# Patient Record
Sex: MALE | Race: WHITE | NOT HISPANIC OR LATINO | Employment: OTHER | URBAN - METROPOLITAN AREA
[De-identification: names, ages, dates, MRNs, and addresses within clinical notes are randomized per-mention and may not be internally consistent; named-entity substitution may affect disease eponyms.]

---

## 2023-02-17 PROBLEM — E78.5 DYSLIPIDEMIA: Status: ACTIVE | Noted: 2023-02-17

## 2023-02-17 PROBLEM — I10 HYPERTENSION: Status: ACTIVE | Noted: 2023-02-17

## 2023-02-17 PROBLEM — I25.10 CAD S/P PERCUTANEOUS CORONARY ANGIOPLASTY: Status: ACTIVE | Noted: 2023-02-17

## 2023-02-17 PROBLEM — R53.1 WEAKNESS: Status: ACTIVE | Noted: 2023-02-17

## 2023-02-17 PROBLEM — R20.2 PARESTHESIA OF RIGHT LEG: Status: ACTIVE | Noted: 2023-02-17

## 2023-02-17 PROBLEM — C67.9 MALIGNANT NEOPLASM OF URINARY BLADDER (MULTI): Status: ACTIVE | Noted: 2023-02-17

## 2023-02-17 PROBLEM — C61 PROSTATE CANCER (MULTI): Status: ACTIVE | Noted: 2023-02-17

## 2023-02-17 PROBLEM — Z98.61 CAD S/P PERCUTANEOUS CORONARY ANGIOPLASTY: Status: ACTIVE | Noted: 2023-02-17

## 2023-02-17 PROBLEM — M21.371 RIGHT FOOT DROP: Status: ACTIVE | Noted: 2023-02-17

## 2023-02-17 PROBLEM — I65.21 STENOSIS OF RIGHT CAROTID ARTERY: Status: ACTIVE | Noted: 2023-02-17

## 2023-02-17 PROBLEM — R20.0 NUMBNESS OF LEG: Status: ACTIVE | Noted: 2023-02-17

## 2023-02-17 PROBLEM — M25.569 KNEE PAIN: Status: ACTIVE | Noted: 2023-02-17

## 2023-02-17 PROBLEM — M48.061 LUMBAR SPINAL STENOSIS: Status: ACTIVE | Noted: 2023-02-17

## 2023-02-17 PROBLEM — S83.90XA KNEE SPRAIN: Status: ACTIVE | Noted: 2023-02-17

## 2023-02-17 RX ORDER — METOPROLOL SUCCINATE 25 MG/1
1 TABLET, EXTENDED RELEASE ORAL DAILY
COMMUNITY
Start: 2017-10-31 | End: 2023-11-15 | Stop reason: SDUPTHER

## 2023-02-17 RX ORDER — ATORVASTATIN CALCIUM 80 MG/1
1 TABLET, FILM COATED ORAL DAILY
COMMUNITY
Start: 2014-01-09 | End: 2023-11-15 | Stop reason: SDUPTHER

## 2023-02-17 RX ORDER — ASPIRIN 81 MG/1
1 TABLET ORAL DAILY
COMMUNITY
Start: 2022-03-29

## 2023-03-31 ENCOUNTER — OFFICE VISIT (OUTPATIENT)
Dept: PRIMARY CARE | Facility: CLINIC | Age: 73
End: 2023-03-31
Payer: MEDICARE

## 2023-03-31 VITALS
HEART RATE: 78 BPM | SYSTOLIC BLOOD PRESSURE: 122 MMHG | WEIGHT: 166.4 LBS | TEMPERATURE: 97.3 F | HEIGHT: 70 IN | BODY MASS INDEX: 23.82 KG/M2 | DIASTOLIC BLOOD PRESSURE: 80 MMHG

## 2023-03-31 DIAGNOSIS — I25.10 CAD S/P PERCUTANEOUS CORONARY ANGIOPLASTY: ICD-10-CM

## 2023-03-31 DIAGNOSIS — Z00.00 ROUTINE GENERAL MEDICAL EXAMINATION AT HEALTH CARE FACILITY: Primary | ICD-10-CM

## 2023-03-31 DIAGNOSIS — Z98.61 CAD S/P PERCUTANEOUS CORONARY ANGIOPLASTY: ICD-10-CM

## 2023-03-31 DIAGNOSIS — C61 PROSTATE CANCER (MULTI): ICD-10-CM

## 2023-03-31 PROBLEM — M48.061 LUMBAR SPINAL STENOSIS: Status: RESOLVED | Noted: 2023-02-17 | Resolved: 2023-03-31

## 2023-03-31 PROBLEM — R20.2 PARESTHESIA OF RIGHT LEG: Status: RESOLVED | Noted: 2023-02-17 | Resolved: 2023-03-31

## 2023-03-31 PROBLEM — I65.21 STENOSIS OF RIGHT CAROTID ARTERY: Status: RESOLVED | Noted: 2023-02-17 | Resolved: 2023-03-31

## 2023-03-31 PROBLEM — C67.9 MALIGNANT NEOPLASM OF URINARY BLADDER (MULTI): Status: RESOLVED | Noted: 2023-02-17 | Resolved: 2023-03-31

## 2023-03-31 PROBLEM — M21.371 RIGHT FOOT DROP: Status: RESOLVED | Noted: 2023-02-17 | Resolved: 2023-03-31

## 2023-03-31 PROBLEM — R20.0 NUMBNESS OF LEG: Status: RESOLVED | Noted: 2023-02-17 | Resolved: 2023-03-31

## 2023-03-31 PROCEDURE — 1159F MED LIST DOCD IN RCRD: CPT | Performed by: INTERNAL MEDICINE

## 2023-03-31 PROCEDURE — 1036F TOBACCO NON-USER: CPT | Performed by: INTERNAL MEDICINE

## 2023-03-31 PROCEDURE — 3079F DIAST BP 80-89 MM HG: CPT | Performed by: INTERNAL MEDICINE

## 2023-03-31 PROCEDURE — 3074F SYST BP LT 130 MM HG: CPT | Performed by: INTERNAL MEDICINE

## 2023-03-31 PROCEDURE — 1160F RVW MEDS BY RX/DR IN RCRD: CPT | Performed by: INTERNAL MEDICINE

## 2023-03-31 PROCEDURE — G0439 PPPS, SUBSEQ VISIT: HCPCS | Performed by: INTERNAL MEDICINE

## 2023-03-31 PROCEDURE — 1170F FXNL STATUS ASSESSED: CPT | Performed by: INTERNAL MEDICINE

## 2023-03-31 ASSESSMENT — ENCOUNTER SYMPTOMS
MUSCULOSKELETAL NEGATIVE: 1
CARDIOVASCULAR NEGATIVE: 1
DEPRESSION: 0
EYES NEGATIVE: 1
CONSTITUTIONAL NEGATIVE: 1
NUMBNESS: 1
GASTROINTESTINAL NEGATIVE: 1
OCCASIONAL FEELINGS OF UNSTEADINESS: 0
LOSS OF SENSATION IN FEET: 0
RESPIRATORY NEGATIVE: 1

## 2023-03-31 ASSESSMENT — ACTIVITIES OF DAILY LIVING (ADL)
BATHING: INDEPENDENT
DRESSING: INDEPENDENT

## 2023-03-31 NOTE — PROGRESS NOTES
"Subjective   Reason for Visit: Mario Pringle is an 72 y.o. male here for a Medicare Wellness visit.     Past Medical, Surgical, and Family History reviewed and updated in chart.    Reviewed all medications by prescribing practitioner or clinical pharmacist (such as prescriptions, OTCs, herbal therapies and supplements) and documented in the medical record.    Patient was seen for wellness exam today, he has a back surgery in the interim, he has carotid Dopplers done, he is doing well, he has a history of coronary artery disease with PCI, he has history of stroke, he has history of prostate cancer, he has been followed at [facility] for prostate cancer, we reviewed vaccinations, we went through all the events and concerns, we went through living will, wellness exam was completed.        Patient Care Team:  Omkar Rodriguez MD as PCP - General  Arlene Eduardo MD as PCP - [facility] Attributed Provider     Review of Systems   Constitutional: Negative.    HENT: Negative.     Eyes: Negative.    Respiratory: Negative.     Cardiovascular: Negative.    Gastrointestinal: Negative.    Genitourinary:         Incontinance   Musculoskeletal: Negative.         Mild foot drop rt side   Skin: Negative.    Neurological:  Positive for numbness.       Objective   Vitals:  /80 (BP Location: Right arm, Patient Position: Sitting, BP Cuff Size: Adult)   Pulse 78   Temp 36.3 °C (97.3 °F) (Temporal)   Ht 1.765 m (5' 9.5\")   Wt 75.5 kg (166 lb 6.4 oz)   BMI 24.22 kg/m²       Physical Exam  Constitutional:       Appearance: Normal appearance. He is normal weight.   HENT:      Head: Normocephalic.      Nose: Nose normal.   Eyes:      Conjunctiva/sclera: Conjunctivae normal.   Cardiovascular:      Rate and Rhythm: Normal rate and regular rhythm.      Pulses: Normal pulses.      Heart sounds: Normal heart sounds.   Pulmonary:      Effort: Pulmonary effort is normal.      Breath sounds: Normal breath sounds.   Abdominal:      General: " Abdomen is flat.      Palpations: Abdomen is soft.   Musculoskeletal:         General: Normal range of motion.      Cervical back: Normal range of motion and neck supple.   Skin:     General: Skin is warm and dry.   Neurological:      General: No focal deficit present.      Mental Status: He is oriented to person, place, and time. Mental status is at baseline.   Psychiatric:         Mood and Affect: Mood normal.       Assessment/Plan   Problem List Items Addressed This Visit          Other    Routine general medical examination at [facility] Medicare wellness exam was done, he comes annually, interim labs and investigations were reviewed, no major concern, 23.4% risk of atherosclerotic cardiovascular disease risk noticed patient has been seen by cardiology, incontinence persist, he can get a Shingrix vaccine, I do not see any obvious foot drop in the right lower extremity, he will come annually for wellness check.

## 2023-04-05 ENCOUNTER — LAB (OUTPATIENT)
Dept: LAB | Facility: LAB | Age: 73
End: 2023-04-05
Payer: MEDICARE

## 2023-04-05 DIAGNOSIS — I25.10 CAD S/P PERCUTANEOUS CORONARY ANGIOPLASTY: ICD-10-CM

## 2023-04-05 DIAGNOSIS — Z00.00 ROUTINE GENERAL MEDICAL EXAMINATION AT HEALTH CARE FACILITY: ICD-10-CM

## 2023-04-05 DIAGNOSIS — C61 PROSTATE CANCER (MULTI): ICD-10-CM

## 2023-04-05 DIAGNOSIS — Z98.61 CAD S/P PERCUTANEOUS CORONARY ANGIOPLASTY: ICD-10-CM

## 2023-04-05 LAB
ALANINE AMINOTRANSFERASE (SGPT) (U/L) IN SER/PLAS: 30 U/L (ref 10–52)
ALBUMIN (G/DL) IN SER/PLAS: 4.4 G/DL (ref 3.4–5)
ALKALINE PHOSPHATASE (U/L) IN SER/PLAS: 57 U/L (ref 33–136)
ANION GAP IN SER/PLAS: 12 MMOL/L (ref 10–20)
ASPARTATE AMINOTRANSFERASE (SGOT) (U/L) IN SER/PLAS: 36 U/L (ref 9–39)
BASOPHILS (10*3/UL) IN BLOOD BY AUTOMATED COUNT: 0.05 X10E9/L (ref 0–0.1)
BASOPHILS/100 LEUKOCYTES IN BLOOD BY AUTOMATED COUNT: 0.8 % (ref 0–2)
BILIRUBIN TOTAL (MG/DL) IN SER/PLAS: 0.9 MG/DL (ref 0–1.2)
CALCIUM (MG/DL) IN SER/PLAS: 9.4 MG/DL (ref 8.6–10.3)
CARBON DIOXIDE, TOTAL (MMOL/L) IN SER/PLAS: 28 MMOL/L (ref 21–32)
CHLORIDE (MMOL/L) IN SER/PLAS: 106 MMOL/L (ref 98–107)
CHOLESTEROL (MG/DL) IN SER/PLAS: 142 MG/DL (ref 0–199)
CHOLESTEROL IN HDL (MG/DL) IN SER/PLAS: 45.5 MG/DL
CHOLESTEROL/HDL RATIO: 3.1
CREATININE (MG/DL) IN SER/PLAS: 1.04 MG/DL (ref 0.5–1.3)
EOSINOPHILS (10*3/UL) IN BLOOD BY AUTOMATED COUNT: 0.25 X10E9/L (ref 0–0.4)
EOSINOPHILS/100 LEUKOCYTES IN BLOOD BY AUTOMATED COUNT: 3.9 % (ref 0–6)
ERYTHROCYTE DISTRIBUTION WIDTH (RATIO) BY AUTOMATED COUNT: 12.7 % (ref 11.5–14.5)
ERYTHROCYTE MEAN CORPUSCULAR HEMOGLOBIN CONCENTRATION (G/DL) BY AUTOMATED: 34 G/DL (ref 32–36)
ERYTHROCYTE MEAN CORPUSCULAR VOLUME (FL) BY AUTOMATED COUNT: 99 FL (ref 80–100)
ERYTHROCYTES (10*6/UL) IN BLOOD BY AUTOMATED COUNT: 4.36 X10E12/L (ref 4.5–5.9)
GFR MALE: 75 ML/MIN/1.73M2
GLUCOSE (MG/DL) IN SER/PLAS: 89 MG/DL (ref 74–99)
HEMATOCRIT (%) IN BLOOD BY AUTOMATED COUNT: 43 % (ref 41–52)
HEMOGLOBIN (G/DL) IN BLOOD: 14.6 G/DL (ref 13.5–17.5)
HEPATITIS C VIRUS AB PRESENCE IN SERUM: NONREACTIVE
IMMATURE GRANULOCYTES/100 LEUKOCYTES IN BLOOD BY AUTOMATED COUNT: 0.2 % (ref 0–0.9)
LDL: 75 MG/DL (ref 0–99)
LEUKOCYTES (10*3/UL) IN BLOOD BY AUTOMATED COUNT: 6.4 X10E9/L (ref 4.4–11.3)
LYMPHOCYTES (10*3/UL) IN BLOOD BY AUTOMATED COUNT: 2.91 X10E9/L (ref 0.8–3)
LYMPHOCYTES/100 LEUKOCYTES IN BLOOD BY AUTOMATED COUNT: 45.5 % (ref 13–44)
MONOCYTES (10*3/UL) IN BLOOD BY AUTOMATED COUNT: 0.45 X10E9/L (ref 0.05–0.8)
MONOCYTES/100 LEUKOCYTES IN BLOOD BY AUTOMATED COUNT: 7 % (ref 2–10)
NEUTROPHILS (10*3/UL) IN BLOOD BY AUTOMATED COUNT: 2.72 X10E9/L (ref 1.6–5.5)
NEUTROPHILS/100 LEUKOCYTES IN BLOOD BY AUTOMATED COUNT: 42.6 % (ref 40–80)
PLATELETS (10*3/UL) IN BLOOD AUTOMATED COUNT: 166 X10E9/L (ref 150–450)
POTASSIUM (MMOL/L) IN SER/PLAS: 4.4 MMOL/L (ref 3.5–5.3)
PROSTATE SPECIFIC ANTIGEN,SCREEN: <0.01 NG/ML (ref 0–4)
PROTEIN TOTAL: 6.9 G/DL (ref 6.4–8.2)
SODIUM (MMOL/L) IN SER/PLAS: 142 MMOL/L (ref 136–145)
TRIGLYCERIDE (MG/DL) IN SER/PLAS: 109 MG/DL (ref 0–149)
UREA NITROGEN (MG/DL) IN SER/PLAS: 19 MG/DL (ref 6–23)
VLDL: 22 MG/DL (ref 0–40)

## 2023-04-05 PROCEDURE — 80053 COMPREHEN METABOLIC PANEL: CPT

## 2023-04-05 PROCEDURE — 36415 COLL VENOUS BLD VENIPUNCTURE: CPT

## 2023-04-05 PROCEDURE — 86803 HEPATITIS C AB TEST: CPT

## 2023-04-05 PROCEDURE — 85025 COMPLETE CBC W/AUTO DIFF WBC: CPT

## 2023-04-05 PROCEDURE — 80061 LIPID PANEL: CPT

## 2023-04-05 PROCEDURE — 84153 ASSAY OF PSA TOTAL: CPT

## 2023-07-25 ENCOUNTER — OFFICE VISIT (OUTPATIENT)
Dept: PRIMARY CARE | Facility: CLINIC | Age: 73
End: 2023-07-25
Payer: MEDICARE

## 2023-07-25 VITALS
WEIGHT: 165.2 LBS | TEMPERATURE: 97.2 F | HEART RATE: 104 BPM | RESPIRATION RATE: 16 BRPM | OXYGEN SATURATION: 98 % | DIASTOLIC BLOOD PRESSURE: 80 MMHG | SYSTOLIC BLOOD PRESSURE: 120 MMHG | BODY MASS INDEX: 23.65 KG/M2 | HEIGHT: 70 IN

## 2023-07-25 DIAGNOSIS — L29.9 ITCHY SKIN: ICD-10-CM

## 2023-07-25 DIAGNOSIS — L29.8 PRURITIC ERYTHEMATOUS RASH: ICD-10-CM

## 2023-07-25 DIAGNOSIS — Z91.030 BEE STING ALLERGY: Primary | ICD-10-CM

## 2023-07-25 PROCEDURE — 99213 OFFICE O/P EST LOW 20 MIN: CPT | Performed by: NURSE PRACTITIONER

## 2023-07-25 RX ORDER — PREDNISONE 10 MG/1
TABLET ORAL
Qty: 30 TABLET | Refills: 0 | Status: SHIPPED | OUTPATIENT
Start: 2023-07-25 | End: 2023-08-04

## 2023-07-25 RX ORDER — CETIRIZINE HYDROCHLORIDE 10 MG/1
10 TABLET ORAL DAILY
Qty: 30 TABLET | Refills: 2 | Status: SHIPPED | OUTPATIENT
Start: 2023-07-25 | End: 2024-05-31 | Stop reason: WASHOUT

## 2023-07-25 RX ORDER — HYDROCORTISONE 25 MG/G
OINTMENT TOPICAL 2 TIMES DAILY PRN
Qty: 30 G | Refills: 2 | Status: SHIPPED | OUTPATIENT
Start: 2023-07-25 | End: 2024-05-31 | Stop reason: WASHOUT

## 2023-07-25 ASSESSMENT — PATIENT HEALTH QUESTIONNAIRE - PHQ9
1. LITTLE INTEREST OR PLEASURE IN DOING THINGS: NOT AT ALL
2. FEELING DOWN, DEPRESSED OR HOPELESS: NOT AT ALL
2. FEELING DOWN, DEPRESSED OR HOPELESS: NOT AT ALL
SUM OF ALL RESPONSES TO PHQ9 QUESTIONS 1 AND 2: 0
SUM OF ALL RESPONSES TO PHQ9 QUESTIONS 1 AND 2: 0
1. LITTLE INTEREST OR PLEASURE IN DOING THINGS: NOT AT ALL

## 2023-07-25 ASSESSMENT — ENCOUNTER SYMPTOMS
DEPRESSION: 0
LOSS OF SENSATION IN FEET: 0
OCCASIONAL FEELINGS OF UNSTEADINESS: 0

## 2023-07-25 NOTE — PROGRESS NOTES
Subjective   Patient ID: Mario Pringle is a 73 y.o. male who is with complaint of multiple red itchy skin rash due to bee sting allergy,    HPI  Patient is a 73 y.o. male who CONSULTED AT [facility] today. Patient is with complaints of multiple red itchy rash on both arms, both forearms, both thighs, and both legs. Patient states that present condition started about 4 days ago with itchy red skin rash on the mentioned areas after being stung multiple times by bees. he states that the rash are red, itchy, not painful, with no discharge, and no bleeding. he tried OTC but it afforded only slight / temporary relief of symptoms. he denies any wheezing, shortness of breath, change in voice, nor chest pain at present. he denies fever, chills, cough, nor runny nose. he denies any other signs or symptoms.    Review of Systems  General: no weight loss, generally healthy, no fatigue  Head:  no headaches / sinus pain, no vertigo, no injury  Eyes: no diplopia, no tearing, no pain,   Ears: no change in hearing, no tinnitus, no bleeding, no vertigo  Mouth:  no dental difficulties, no gingival bleeding, no sore throat, no loss of sense of taste  Nose: no congestion, no  discharge, no bleeding, no obstruction, no loss of sense of smell  Neck: no stiffness, no pain, no tenderness, no masses, no bruit  Pulmonary: no dyspnea, no wheezing, no hemoptysis, no cough  Cardiovascular: no chest pain, no palpitations, no syncope, no orthopnea  Gastrointestinal: no change in appetite, no dysphagia, no abdominal pains, no diarrhea, no emesis, no melena  Genito Urinary: no dysuria, no urinary urgency, no nocturia, no incontinence, no change in nature of urine  Musculoskeletal: no muscle ache, no joint pain, no limitation of range of motion, no paresthesia, no numbness  Skin: (+) multiple red itchy rash on both arms, both forearms, both thighs, and both legs  Constitutional: no fever, no chills, no night  sweats    Objective   Physical Exam  General: ambulatory, in no acute distress  Head: normocephalic, no lesions  Eyes: pink palpebral conjunctiva, anicteric sclerae, PERRLA, EOM's full  Nose: nasal mucosa normal, no nasal discharge, no bleeding, no obstruction  Throat: clear, no exudate, no lesions  Neck: supple, no masses, no bruits  Chest: symmetrical chest expansion, no lagging, no retractions, clear breath sounds, no rales, no wheezes  Extremities: full and equal peripheral pulses, no edema,  SKIN: (+) multiple maculopapular rash on both arms, both forearms, both thighs, and both legs: rashes are erythematous, slightly elevated, measures 2-4 cm diameter, pruritic, non tender, no discharge, no bleeding, with no lymphatic streaking of skin, with multiple scratch marks on involved areas.    Assessment/Plan   Problem List Items Addressed This Visit    None  Visit Diagnoses       Bee sting allergy    -  Primary    Relevant Medications    predniSONE (Deltasone) 10 mg tablet    cetirizine (ZyrTEC) 10 mg tablet    hydrocortisone 2.5 % ointment    Itchy skin        Relevant Medications    predniSONE (Deltasone) 10 mg tablet    cetirizine (ZyrTEC) 10 mg tablet    hydrocortisone 2.5 % ointment    Pruritic erythematous rash        Relevant Medications    predniSONE (Deltasone) 10 mg tablet    cetirizine (ZyrTEC) 10 mg tablet    hydrocortisone 2.5 % ointment        DISCHARGE SUMMARY:   Patient was seen and examined. Diagnosis, treatment, treatment options, and possible complications of today's illness discussed and explained to patient. Patient to take medication/s associated with this visit. Advised avoidance of known or suspected allergen. Advised hypoallergenic diet. Advised to come back if with worsening or persistent symptoms. Advised to come back if there is wheezing, change in voice quality, shortness of breath or chest pain. Patient verbalized understanding of plan of care.    Patient to come back in 7 - 10 days if  needed for worsening symptoms.

## 2023-07-25 NOTE — PROGRESS NOTES
"Subjective   Patient ID: Mario Pringle is a 73 y.o. male who presents for Insect Bite.    Patient is in office with a biee sting on left arm , both legs. Patient took benadryl with little help. Patient states sits itching at the moment.         Review of Systems    Objective   /80   Pulse 104   Temp 36.2 °C (97.2 °F) (Temporal)   Resp 16   Ht 1.778 m (5' 10\")   Wt 74.9 kg (165 lb 3.2 oz)   SpO2 98%   BMI 23.70 kg/m²     Physical Exam    Assessment/Plan          "

## 2023-10-05 ENCOUNTER — ANCILLARY PROCEDURE (OUTPATIENT)
Dept: CARDIOLOGY | Facility: CLINIC | Age: 73
End: 2023-10-05
Payer: MEDICARE

## 2023-10-05 ENCOUNTER — CLINICAL SUPPORT (OUTPATIENT)
Dept: CARDIOLOGY | Facility: CLINIC | Age: 73
End: 2023-10-05
Payer: MEDICARE

## 2023-10-05 VITALS — BODY MASS INDEX: 26.54 KG/M2 | HEIGHT: 66 IN | WEIGHT: 165.12 LBS

## 2023-10-05 DIAGNOSIS — I25.10 CORONARY ARTERY DISEASE, UNSPECIFIED VESSEL OR LESION TYPE, UNSPECIFIED WHETHER ANGINA PRESENT, UNSPECIFIED WHETHER NATIVE OR TRANSPLANTED HEART: ICD-10-CM

## 2023-10-05 DIAGNOSIS — I10 HYPERTENSION, UNSPECIFIED TYPE: ICD-10-CM

## 2023-10-05 DIAGNOSIS — I35.8 HEART MURMUR, AORTIC: ICD-10-CM

## 2023-10-05 DIAGNOSIS — R09.89 BRUIT: ICD-10-CM

## 2023-10-05 DIAGNOSIS — R01.1 CARDIAC MURMUR, UNSPECIFIED: ICD-10-CM

## 2023-10-05 DIAGNOSIS — I65.21 CAROTID STENOSIS, ASYMPTOMATIC, RIGHT: ICD-10-CM

## 2023-10-05 PROCEDURE — 93306 TTE W/DOPPLER COMPLETE: CPT | Performed by: INTERNAL MEDICINE

## 2023-10-05 PROCEDURE — 93880 EXTRACRANIAL BILAT STUDY: CPT | Performed by: INTERNAL MEDICINE

## 2023-10-05 PROCEDURE — 93880 EXTRACRANIAL BILAT STUDY: CPT

## 2023-10-05 PROCEDURE — 93306 TTE W/DOPPLER COMPLETE: CPT

## 2023-10-06 LAB — EJECTION FRACTION APICAL 4 CHAMBER: 35.2

## 2023-10-25 ENCOUNTER — TELEPHONE (OUTPATIENT)
Dept: CARDIOLOGY | Facility: CLINIC | Age: 73
End: 2023-10-25
Payer: MEDICARE

## 2023-10-25 DIAGNOSIS — I25.5 CARDIOMYOPATHY, ISCHEMIC: ICD-10-CM

## 2023-10-25 DIAGNOSIS — I65.23 BILATERAL CAROTID ARTERY STENOSIS: Primary | ICD-10-CM

## 2023-10-25 DIAGNOSIS — I25.10 CAD S/P PERCUTANEOUS CORONARY ANGIOPLASTY: ICD-10-CM

## 2023-10-25 DIAGNOSIS — Z98.61 CAD S/P PERCUTANEOUS CORONARY ANGIOPLASTY: ICD-10-CM

## 2023-10-25 DIAGNOSIS — R93.1 ABNORMAL ECHOCARDIOGRAM: ICD-10-CM

## 2023-10-25 NOTE — TELEPHONE ENCOUNTER
Pt contact's office leaves voicemail message requesting results of recent echo and stress results. [place]

## 2023-10-31 PROBLEM — I25.5 CARDIOMYOPATHY, ISCHEMIC: Status: ACTIVE | Noted: 2023-10-31

## 2023-10-31 PROBLEM — I77.9 BILATERAL CAROTID ARTERY DISEASE (CMS-HCC): Status: ACTIVE | Noted: 2023-10-31

## 2023-11-15 DIAGNOSIS — I15.9 SECONDARY HYPERTENSION: ICD-10-CM

## 2023-11-15 RX ORDER — METOPROLOL SUCCINATE 25 MG/1
25 TABLET, EXTENDED RELEASE ORAL DAILY
Qty: 90 TABLET | Refills: 1 | Status: SHIPPED | OUTPATIENT
Start: 2023-11-15 | End: 2024-04-28

## 2023-11-15 RX ORDER — ATORVASTATIN CALCIUM 80 MG/1
80 TABLET, FILM COATED ORAL DAILY
Qty: 90 TABLET | Refills: 2 | Status: SHIPPED | OUTPATIENT
Start: 2023-11-15

## 2023-12-04 ENCOUNTER — CLINICAL SUPPORT (OUTPATIENT)
Dept: CARDIOLOGY | Facility: CLINIC | Age: 73
End: 2023-12-04
Payer: MEDICARE

## 2023-12-04 ENCOUNTER — ANCILLARY PROCEDURE (OUTPATIENT)
Dept: RADIOLOGY | Facility: CLINIC | Age: 73
End: 2023-12-04
Payer: MEDICARE

## 2023-12-04 DIAGNOSIS — I25.10 CAD S/P PERCUTANEOUS CORONARY ANGIOPLASTY: ICD-10-CM

## 2023-12-04 DIAGNOSIS — I25.5 CARDIOMYOPATHY, ISCHEMIC: ICD-10-CM

## 2023-12-04 DIAGNOSIS — Z98.61 CAD S/P PERCUTANEOUS CORONARY ANGIOPLASTY: ICD-10-CM

## 2023-12-04 DIAGNOSIS — R93.1 ABNORMAL ECHOCARDIOGRAM: ICD-10-CM

## 2023-12-04 PROCEDURE — 78452 HT MUSCLE IMAGE SPECT MULT: CPT | Performed by: INTERNAL MEDICINE

## 2023-12-04 PROCEDURE — 93017 CV STRESS TEST TRACING ONLY: CPT

## 2023-12-04 PROCEDURE — 78452 HT MUSCLE IMAGE SPECT MULT: CPT

## 2023-12-04 PROCEDURE — 93016 CV STRESS TEST SUPVJ ONLY: CPT | Performed by: INTERNAL MEDICINE

## 2023-12-04 PROCEDURE — 3430000001 HC RX 343 DIAGNOSTIC RADIOPHARMACEUTICALS: Performed by: INTERNAL MEDICINE

## 2023-12-04 PROCEDURE — A9502 TC99M TETROFOSMIN: HCPCS | Performed by: INTERNAL MEDICINE

## 2023-12-04 PROCEDURE — 93018 CV STRESS TEST I&R ONLY: CPT | Performed by: INTERNAL MEDICINE

## 2023-12-04 RX ADMIN — TETROFOSMIN 36 MILLICURIE: 0.23 INJECTION, POWDER, LYOPHILIZED, FOR SOLUTION INTRAVENOUS at 09:35

## 2023-12-04 RX ADMIN — TETROFOSMIN 11.3 MILLICURIE: 0.23 INJECTION, POWDER, LYOPHILIZED, FOR SOLUTION INTRAVENOUS at 07:57

## 2024-04-04 ENCOUNTER — APPOINTMENT (OUTPATIENT)
Dept: PRIMARY CARE | Facility: CLINIC | Age: 74
End: 2024-04-04
Payer: MEDICARE

## 2024-04-05 ENCOUNTER — APPOINTMENT (OUTPATIENT)
Dept: PRIMARY CARE | Facility: CLINIC | Age: 74
End: 2024-04-05
Payer: MEDICARE

## 2024-04-28 DIAGNOSIS — I15.9 SECONDARY HYPERTENSION: ICD-10-CM

## 2024-04-28 RX ORDER — METOPROLOL SUCCINATE 25 MG/1
25 TABLET, EXTENDED RELEASE ORAL DAILY
Qty: 90 TABLET | Refills: 1 | Status: SHIPPED | OUTPATIENT
Start: 2024-04-28 | End: 2024-06-10 | Stop reason: SDUPTHER

## 2024-05-07 ENCOUNTER — OFFICE VISIT (OUTPATIENT)
Dept: PRIMARY CARE | Facility: CLINIC | Age: 74
End: 2024-05-07
Payer: MEDICARE

## 2024-05-07 VITALS
TEMPERATURE: 96.6 F | WEIGHT: 161 LBS | HEIGHT: 66 IN | BODY MASS INDEX: 25.88 KG/M2 | HEART RATE: 67 BPM | SYSTOLIC BLOOD PRESSURE: 120 MMHG | DIASTOLIC BLOOD PRESSURE: 80 MMHG

## 2024-05-07 DIAGNOSIS — I65.23 BILATERAL CAROTID ARTERY STENOSIS: ICD-10-CM

## 2024-05-07 DIAGNOSIS — Z98.61 CAD S/P PERCUTANEOUS CORONARY ANGIOPLASTY: ICD-10-CM

## 2024-05-07 DIAGNOSIS — Z12.12 SCREENING FOR COLORECTAL CANCER: ICD-10-CM

## 2024-05-07 DIAGNOSIS — Z13.29 THYROID DISORDER SCREEN: ICD-10-CM

## 2024-05-07 DIAGNOSIS — C61 PROSTATE CANCER (MULTI): ICD-10-CM

## 2024-05-07 DIAGNOSIS — I25.10 CAD S/P PERCUTANEOUS CORONARY ANGIOPLASTY: ICD-10-CM

## 2024-05-07 DIAGNOSIS — Z12.11 SCREENING FOR COLORECTAL CANCER: ICD-10-CM

## 2024-05-07 DIAGNOSIS — Z00.00 ROUTINE GENERAL MEDICAL EXAMINATION AT HEALTH CARE FACILITY: Primary | ICD-10-CM

## 2024-05-07 PROBLEM — I77.9 BILATERAL CAROTID ARTERY DISEASE (CMS-HCC): Status: RESOLVED | Noted: 2023-10-31 | Resolved: 2024-05-07

## 2024-05-07 PROCEDURE — 1170F FXNL STATUS ASSESSED: CPT | Performed by: INTERNAL MEDICINE

## 2024-05-07 PROCEDURE — G0439 PPPS, SUBSEQ VISIT: HCPCS | Performed by: INTERNAL MEDICINE

## 2024-05-07 PROCEDURE — 1159F MED LIST DOCD IN RCRD: CPT | Performed by: INTERNAL MEDICINE

## 2024-05-07 PROCEDURE — 1123F ACP DISCUSS/DSCN MKR DOCD: CPT | Performed by: INTERNAL MEDICINE

## 2024-05-07 PROCEDURE — 1160F RVW MEDS BY RX/DR IN RCRD: CPT | Performed by: INTERNAL MEDICINE

## 2024-05-07 PROCEDURE — 3079F DIAST BP 80-89 MM HG: CPT | Performed by: INTERNAL MEDICINE

## 2024-05-07 PROCEDURE — 1036F TOBACCO NON-USER: CPT | Performed by: INTERNAL MEDICINE

## 2024-05-07 PROCEDURE — 3074F SYST BP LT 130 MM HG: CPT | Performed by: INTERNAL MEDICINE

## 2024-05-07 PROCEDURE — 1157F ADVNC CARE PLAN IN RCRD: CPT | Performed by: INTERNAL MEDICINE

## 2024-05-07 RX ORDER — EZETIMIBE 10 MG/1
10 TABLET ORAL DAILY
COMMUNITY

## 2024-05-07 ASSESSMENT — ACTIVITIES OF DAILY LIVING (ADL)
EATING: INDEPENDENT
TOILETING: INDEPENDENT
WALKS IN HOME: INDEPENDENT
BATHING: INDEPENDENT
BATHING: INDEPENDENT
ADEQUATE_TO_COMPLETE_ADL: YES
TOILETING: INDEPENDENT
JUDGMENT_ADEQUATE_SAFELY_COMPLETE_DAILY_ACTIVITIES: YES
PILL BOX USED: NO
TAKING MEDICATION: INDEPENDENT
DOING HOUSEWORK: INDEPENDENT
JUDGMENT_ADEQUATE_SAFELY_COMPLETE_DAILY_ACTIVITIES: YES
MANAGING_FINANCES: INDEPENDENT
GROCERY SHOPPING: INDEPENDENT
STIL DRIVING: YES
ADEQUATE_TO_COMPLETE_ADL: YES
NEEDS ASSISTANCE WITH FOOD: INDEPENDENT
BATHING: INDEPENDENT
MANAGING FINANCES: INDEPENDENT
DRESSING: INDEPENDENT
TAKING_MEDICATION: INDEPENDENT
PATIENT'S MEMORY ADEQUATE TO SAFELY COMPLETE DAILY ACTIVITIES?: YES
DOING_HOUSEWORK: INDEPENDENT
FEEDING YOURSELF: INDEPENDENT
DRESSING YOURSELF: INDEPENDENT
HEARING - RIGHT EAR: FUNCTIONAL
USING TRANSPORTATION: INDEPENDENT
GROCERY_SHOPPING: INDEPENDENT
FEEDING: INDEPENDENT
GROOMING: INDEPENDENT
HEARING - LEFT EAR: FUNCTIONAL
PREPARING MEALS: INDEPENDENT
DRESSING: INDEPENDENT
USING TELEPHONE: INDEPENDENT

## 2024-05-07 ASSESSMENT — COLUMBIA-SUICIDE SEVERITY RATING SCALE - C-SSRS
1. IN THE PAST MONTH, HAVE YOU WISHED YOU WERE DEAD OR WISHED YOU COULD GO TO SLEEP AND NOT WAKE UP?: NO
2. HAVE YOU ACTUALLY HAD ANY THOUGHTS OF KILLING YOURSELF?: NO

## 2024-05-07 ASSESSMENT — PATIENT HEALTH QUESTIONNAIRE - PHQ9
2. FEELING DOWN, DEPRESSED OR HOPELESS: NOT AT ALL
1. LITTLE INTEREST OR PLEASURE IN DOING THINGS: NOT AT ALL
SUM OF ALL RESPONSES TO PHQ9 QUESTIONS 1 AND 2: 0
2. FEELING DOWN, DEPRESSED OR HOPELESS: NOT AT ALL
1. LITTLE INTEREST OR PLEASURE IN DOING THINGS: NOT AT ALL
SUM OF ALL RESPONSES TO PHQ9 QUESTIONS 1 AND 2: 0

## 2024-05-07 ASSESSMENT — ENCOUNTER SYMPTOMS
GASTROINTESTINAL NEGATIVE: 1
CONSTITUTIONAL NEGATIVE: 1
RESPIRATORY NEGATIVE: 1
MUSCULOSKELETAL NEGATIVE: 1
EYES NEGATIVE: 1
LOSS OF SENSATION IN FEET: 0
WEAKNESS: 0
DEPRESSION: 0
DIZZINESS: 0
NUMBNESS: 1
OCCASIONAL FEELINGS OF UNSTEADINESS: 0
CARDIOVASCULAR NEGATIVE: 1

## 2024-05-07 NOTE — PROGRESS NOTES
"Subjective   Reason for Visit: Mario Pringle is an 73 y.o. male here for a Medicare Wellness visit.     Past Medical, Surgical, and Family History reviewed and updated in chart.    Reviewed all medications by prescribing practitioner or clinical pharmacist (such as prescriptions, OTCs, herbal therapies and supplements) and documented in the medical record.    73-year-old male patient came year for annual wellness exam.  He works part-time, he is very much active with his lifestyle he does regular physical activities.  In 2006 he has a myocardial infarction which led to PCI's, at that time his ejection fraction was 45%, recently he was seen by cardiologist has echo done ejection fraction was 30% has a stress Myoview done it was abnormal because of large infarct and there was multiple areas of hypokinesis because of old infarct but there was no ischemia but ejection fraction was still reported to be low, he does not have any symptoms from low ejection fraction, carotids were 50 to 60% stenosed, he is on full dose of statin aspirin, he is a survivor of prostate cancer, he is very much active physically, no fall, he has up to date living will, he comes once a year for wellness exam, he takes his medications as prescribed.  His vaccinations are up to date.        Patient Care Team:  Omkar Rodriguez MD as PCP - General  Omkar Rodriguez MD as PCP - [facility] Attributed [facility]  Juan Sharma MD as Consulting Physician (Cardiology)     Review of Systems   Constitutional: Negative.    HENT: Negative.     Eyes: Negative.    Respiratory: Negative.     Cardiovascular: Negative.  Negative for chest pain and leg swelling.   Gastrointestinal: Negative.    Musculoskeletal: Negative.    Neurological:  Positive for numbness. Negative for dizziness and weakness.       Objective   Vitals:  Temp 35.9 °C (96.6 °F) (Temporal)   Ht 1.676 m (5' 6\")   Wt 73 kg (161 lb)   BMI 25.99 kg/m²       Physical Exam  Constitutional:       " Appearance: Normal appearance. He is normal weight.   HENT:      Head: Normocephalic.      Nose: Nose normal.   Eyes:      Conjunctiva/sclera: Conjunctivae normal.   Cardiovascular:      Rate and Rhythm: Normal rate. Rhythm regularly irregular.      Pulses: Normal pulses.      Heart sounds: Normal heart sounds.   Pulmonary:      Effort: Pulmonary effort is normal.      Breath sounds: Normal breath sounds.   Abdominal:      General: Abdomen is flat.      Palpations: Abdomen is soft.   Musculoskeletal:         General: Normal range of motion.      Cervical back: Normal range of motion and neck supple.   Skin:     General: Skin is warm and dry.   Neurological:      General: No focal deficit present.      Mental Status: He is oriented to person, place, and time. Mental status is at baseline.   Psychiatric:         Mood and Affect: Mood normal.         Assessment/Plan   Problem List Items Addressed This Visit       Routine general medical examination at health care facility - [facility]     Other Visit Diagnoses       Screening for colorectal cancer        Relevant Orders    Referral to Gastroenterology        Wellness exam was completed, message was sent to the cardiology about the abnormal echo and low ejection fraction, he has isolated PVCs so we wonder that patient could be a candidate for ICD.  He is feeling fine he is asymptomatic at there is no fluid overload, there is no obvious symptoms of ischemic cardiomyopathy that she manifest, his PSA has been checked annually, several laboratories were ordered as part of the routine.  No chest pains, no angina, he has a 1 stroke in the past, he remains on beta-blockers, statins, Zetia, LDL has been kept as low as possible.  He is very particular about his physical health, he is particular about his diet, cardiology acknowledged the message and patient will be seen this week, question is whether he needs ICD or not as I told him.  No change in therapies and wellness exam  related questions were asked and addressed, he will come once a year for his wellness exam.

## 2024-05-10 ENCOUNTER — OFFICE VISIT (OUTPATIENT)
Dept: CARDIOLOGY | Facility: CLINIC | Age: 74
End: 2024-05-10
Payer: MEDICARE

## 2024-05-10 VITALS
BODY MASS INDEX: 26.13 KG/M2 | SYSTOLIC BLOOD PRESSURE: 118 MMHG | HEIGHT: 66 IN | HEART RATE: 98 BPM | DIASTOLIC BLOOD PRESSURE: 60 MMHG | WEIGHT: 162.6 LBS

## 2024-05-10 DIAGNOSIS — I50.22 CHRONIC SYSTOLIC HEART FAILURE (MULTI): ICD-10-CM

## 2024-05-10 DIAGNOSIS — I25.2 OLD INFERIOR WALL MYOCARDIAL INFARCTION: ICD-10-CM

## 2024-05-10 DIAGNOSIS — I25.10 ATHEROSCLEROSIS OF NATIVE CORONARY ARTERY OF NATIVE HEART WITHOUT ANGINA PECTORIS: Primary | ICD-10-CM

## 2024-05-10 DIAGNOSIS — I10 PRIMARY HYPERTENSION: ICD-10-CM

## 2024-05-10 DIAGNOSIS — E78.5 DYSLIPIDEMIA: ICD-10-CM

## 2024-05-10 DIAGNOSIS — I25.5 CARDIOMYOPATHY, ISCHEMIC: ICD-10-CM

## 2024-05-10 DIAGNOSIS — Z98.61 H/O PERCUTANEOUS TRANSLUMINAL CORONARY ANGIOPLASTY: ICD-10-CM

## 2024-05-10 PROCEDURE — 3078F DIAST BP <80 MM HG: CPT | Performed by: INTERNAL MEDICINE

## 2024-05-10 PROCEDURE — 1159F MED LIST DOCD IN RCRD: CPT | Performed by: INTERNAL MEDICINE

## 2024-05-10 PROCEDURE — 1160F RVW MEDS BY RX/DR IN RCRD: CPT | Performed by: INTERNAL MEDICINE

## 2024-05-10 PROCEDURE — 3074F SYST BP LT 130 MM HG: CPT | Performed by: INTERNAL MEDICINE

## 2024-05-10 PROCEDURE — 1123F ACP DISCUSS/DSCN MKR DOCD: CPT | Performed by: INTERNAL MEDICINE

## 2024-05-10 PROCEDURE — 99215 OFFICE O/P EST HI 40 MIN: CPT | Performed by: INTERNAL MEDICINE

## 2024-05-10 PROCEDURE — 1157F ADVNC CARE PLAN IN RCRD: CPT | Performed by: INTERNAL MEDICINE

## 2024-05-10 NOTE — H&P (VIEW-ONLY)
Patient:  Mario Pringle  YOB: 1950  MRN: [id number]       HPI:       Mario Pringle is a 73 y.o. male who returns today for cardiac follow-up.   He has a history of atherosclerotic heart disease with remote STEMI in November 2006. He underwent angioplasty and stenting of the RCA at that time. An echocardiogram in 2006 showed inferolateral wall hypokinesis and estimated LV ejection fraction of 45%. Aortic valve appeared to be bicuspid. A stress myocardial perfusion study showed mild inferior wall myocardial ischemia versus shifting diaphragmatic attenuation artifact. Calculated LV ejection fraction 44%. He has a history of hypertension, hyperlipidemia, and carotid artery disease. He has been followed on a regular basis by Dr. Berry Childress. He is maintained on aspirin, atorvastatin, and Toprol-XL.     At the time of his initial visit with me on September 12, 2023 he was doing well.  He takes a brisk 5 to 6 mile walk daily.  He was scheduled for a repeat carotid ultrasound as well as an echocardiogram to reassess LV ejection fraction and reported bicuspid aortic valve.  The echocardiogram showed an estimated LV ejection fraction of 30% with severe hypokinesis of the inferior and inferolateral walls.  The distal anterior wall was hypokinetic.  Right ventricular chamber size and function are normal.  Valvular structure and function were normal.  Carotid ultrasound October 5, 2023 showed 50 to 69% stenosis in the right proximal internal carotid artery and greater than 50% stenosis of the right external carotid artery.  There was 50 to 69% stenosis of the left internal carotid artery.  A myocardial perfusion study on December 4, 2023 showed moderate inferolateral myocardial infarction extends from apex to base.  No evidence of myocardial ischemia by perfusion imaging.  There was severe LV dysfunction with calculated LV ejection fraction of 29%.  Moderate to high risk study though no significant  ischemia identified.    He continues to do well since his last visit.  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  He remains very active without limitations.  He denies any chest pain or shortness of breath. He denies any orthopnea, PND, or increasing peripheral edema. He denies any palpitations, lightheadedness, near-syncope, or syncope. He denies any fever, chills, or cough. He denies any nausea, vomiting, or diaphoresis. He denies any hemoptysis, hematemesis, melena, or hematochezia. Lab studies dated April 5, 2023 showed a normal CBC and CMP. Cholesterol 142 with HDL 45, LDL 75, and triglycerides 109. Other details as noted below.    I spoke with him in detail regarding results of the echocardiogram and stress test.  I suspect he had an extensive inferior myocardial infarction sometime ago.  No ischemia was identified on the stress test and he is not having any active anginal or CHF symptoms.  He will continue on Toprol-XL.  He was advised to start Entresto 24/26 mg twice daily.  He will start Jardiance 10 mg daily.  Titrate Entresto upward as tolerated.  Follow-up in the office in 2 to 3 weeks.  Patient will be contacted by [facility] to have a LifeVest placed.  He will also be scheduled for EP evaluation by Dr. Hutton with an aim for probable ICD implant in the future.     The above portion of this note was dictated by me using voice recognition software. I personally performed the services described in the documentation. The scribe entering the documentation below was in my presence. I affirm that the information is both accurate and complete.        Objective:     Vitals:    05/10/24 1401   BP: 118/60   Pulse: 98       Wt Readings from Last 4 Encounters:   05/10/24 73.8 kg (162 lb 9.6 oz)   05/07/24 73 kg (161 lb)   10/05/23 74.9 kg (165 lb 2 oz)   09/12/23 75.3 kg (166 lb 1 oz)       Allergies:     No Known Allergies       Medications:     Current Outpatient Medications   Medication Instructions     aspirin 81 mg EC tablet 1 tablet, oral, Daily    atorvastatin (LIPITOR) 80 mg, oral, Daily    cetirizine (ZYRTEC) 10 mg, oral, Daily    ezetimibe (ZETIA) 10 mg, oral, Daily    fish oil concentrate (Omega-3) 120-180 mg capsule 1 capsule, oral, Daily    hydrocortisone 2.5 % ointment Topical, 2 times daily PRN    metoprolol succinate XL (TOPROL-XL) 25 mg, oral, Daily    multivitamin with minerals tablet 1 tablet, oral, Daily       Physical Examination:   GENERAL:  Well developed, well nourished, in no acute distress.  CHEST:  Symmetric and nontender.  NEURO/PSYCH:  Alert and oriented times three with approppriate behavior and responses.  NECK:  Supple, no JVD, no bruit.  LUNGS:  Clear to auscultation bilaterally, normal respiratory effort.  HEART:  Rate and rhythm regular with no evident murmur, no gallop appreciated.        There are no rubs, clicks or heaves.  EXTREMITIES:  Warm with good color, no clubbing or cyanosis.  There is no edema noted.  PERIPHERAL VASCULAR:  Pulses present and equally palpable; 2+ throughout.      Lab:     CBC:   Lab Results   Component Value Date    WBC 6.4 04/05/2023    RBC 4.36 (L) 04/05/2023    HGB 14.6 04/05/2023    HCT 43.0 04/05/2023     04/05/2023        CMP:    Lab Results   Component Value Date     04/05/2023    K 4.4 04/05/2023     04/05/2023    CO2 28 04/05/2023    BUN 19 04/05/2023    CREATININE 1.04 04/05/2023    GLUCOSE 89 04/05/2023    CALCIUM 9.4 04/05/2023       Magnesium:    Lab Results   Component Value Date    MG 1.40 (L) 11/11/2022       Lipid Profile:    Lab Results   Component Value Date    TRIG 109 04/05/2023    HDL 45.5 04/05/2023    LDLDIRECT 85 04/26/2022       PT/INR:    Lab Results   Component Value Date    PROTIME 12.3 11/11/2022    INR 1.1 11/11/2022       BMP:  Lab Results   Component Value Date     04/05/2023     11/11/2022     11/04/2022    K 4.4 04/05/2023    K 4.2 11/11/2022    K 4.0 11/04/2022     04/05/2023      11/11/2022     11/04/2022    CO2 28 04/05/2023    CO2 25 11/11/2022    CO2 28 11/04/2022    BUN 19 04/05/2023    BUN 17 11/11/2022    BUN 12 11/04/2022    CREATININE 1.04 04/05/2023    CREATININE 1.04 11/11/2022    CREATININE 0.90 11/04/2022       CBC:  Lab Results   Component Value Date    WBC 6.4 04/05/2023    WBC 9.3 11/11/2022    WBC 7.4 11/04/2022    RBC 4.36 (L) 04/05/2023    RBC 3.61 (L) 11/11/2022    RBC 4.25 (L) 11/04/2022    HGB 14.6 04/05/2023    HGB 12.6 (L) 11/11/2022    HGB 14.6 11/04/2022    HCT 43.0 04/05/2023    HCT 36.0 (L) 11/11/2022    HCT 42.5 11/04/2022    MCV 99 04/05/2023     11/11/2022     11/04/2022    MCHC 34.0 04/05/2023    MCHC 35.0 11/11/2022    MCHC 34.4 11/04/2022    RDW 12.7 04/05/2023    RDW 12.5 11/11/2022    RDW 12.7 11/04/2022     04/05/2023     (L) 11/11/2022     11/04/2022       Hepatic Function Panel:    Lab Results   Component Value Date    ALKPHOS 57 04/05/2023    ALT 30 04/05/2023    AST 36 04/05/2023    PROT 6.9 04/05/2023    BILITOT 0.9 04/05/2023         Problem List:     Patient Active Problem List   Diagnosis    CAD S/P percutaneous coronary angioplasty    Dyslipidemia    Hypertension    Prostate cancer (Multi)    Routine general medical examination at health care facility    Cardiomyopathy, ischemic       Asessment:     Problem List Items Addressed This Visit             ICD-10-CM    Cardiomyopathy, ischemic I25.5    Relevant Medications    sacubitriL-valsartan (Entresto) 24-26 mg tablet    empagliflozin (Jardiance) 10 mg    Other Relevant Orders    Zoll Lifevest    Follow Up In Cardiology    Follow Up In Cardiology    Basic metabolic panel    MR cardiac morphology and function wo IV contrast    MR cardiac morphology and function w and wo IV contrast    Referral to Cardiac Electrophysiology     Other Visit Diagnoses         Codes    Chronic systolic heart failure (Multi)     I50.22    Relevant Medications     sacubitriL-valsartan (Entresto) 24-26 mg tablet    empagliflozin (Jardiance) 10 mg    Other Relevant Orders    Zoll Lifevest    Follow Up In Cardiology    Follow Up In Cardiology    Basic metabolic panel    MR cardiac morphology and function wo IV contrast    MR cardiac morphology and function w and wo IV contrast    Referral to Cardiac Electrophysiology              Plan:

## 2024-05-10 NOTE — PATIENT INSTRUCTIONS
Start Jardiance 10mg take one tab daily  Start Entresto 24/26mg take one tab twice daily    Labs in one week

## 2024-05-10 NOTE — PROGRESS NOTES
Patient:  Mario Pringle  YOB: 1950  MRN: [id number]       HPI:       Mario Pringle is a 73 y.o. male who returns today for cardiac follow-up.   He has a history of atherosclerotic heart disease with remote STEMI in November 2006. He underwent angioplasty and stenting of the RCA at that time. An echocardiogram in 2006 showed inferolateral wall hypokinesis and estimated LV ejection fraction of 45%. Aortic valve appeared to be bicuspid. A stress myocardial perfusion study showed mild inferior wall myocardial ischemia versus shifting diaphragmatic attenuation artifact. Calculated LV ejection fraction 44%. He has a history of hypertension, hyperlipidemia, and carotid artery disease. He has been followed on a regular basis by Dr. Berry Childress. He is maintained on aspirin, atorvastatin, and Toprol-XL.     At the time of his initial visit with me on September 12, 2023 he was doing well.  He takes a brisk 5 to 6 mile walk daily.  He was scheduled for a repeat carotid ultrasound as well as an echocardiogram to reassess LV ejection fraction and reported bicuspid aortic valve.  The echocardiogram showed an estimated LV ejection fraction of 30% with severe hypokinesis of the inferior and inferolateral walls.  The distal anterior wall was hypokinetic.  Right ventricular chamber size and function are normal.  Valvular structure and function were normal.  Carotid ultrasound October 5, 2023 showed 50 to 69% stenosis in the right proximal internal carotid artery and greater than 50% stenosis of the right external carotid artery.  There was 50 to 69% stenosis of the left internal carotid artery.  A myocardial perfusion study on December 4, 2023 showed moderate inferolateral myocardial infarction extends from apex to base.  No evidence of myocardial ischemia by perfusion imaging.  There was severe LV dysfunction with calculated LV ejection fraction of 29%.  Moderate to high risk study though no significant  ischemia identified.    He continues to do well since his last visit.  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  He remains very active without limitations.  He denies any chest pain or shortness of breath. He denies any orthopnea, PND, or increasing peripheral edema. He denies any palpitations, lightheadedness, near-syncope, or syncope. He denies any fever, chills, or cough. He denies any nausea, vomiting, or diaphoresis. He denies any hemoptysis, hematemesis, melena, or hematochezia. Lab studies dated April 5, 2023 showed a normal CBC and CMP. Cholesterol 142 with HDL 45, LDL 75, and triglycerides 109. Other details as noted below.    I spoke with him in detail regarding results of the echocardiogram and stress test.  I suspect he had an extensive inferior myocardial infarction sometime ago.  No ischemia was identified on the stress test and he is not having any active anginal or CHF symptoms.  He will continue on Toprol-XL.  He was advised to start Entresto 24/26 mg twice daily.  He will start Jardiance 10 mg daily.  Titrate Entresto upward as tolerated.  Follow-up in the office in 2 to 3 weeks.  Patient will be contacted by [facility] to have a LifeVest placed.  He will also be scheduled for EP evaluation by Dr. Hutton with an aim for probable ICD implant in the future.     The above portion of this note was dictated by me using voice recognition software. I personally performed the services described in the documentation. The scribe entering the documentation below was in my presence. I affirm that the information is both accurate and complete.        Objective:     Vitals:    05/10/24 1401   BP: 118/60   Pulse: 98       Wt Readings from Last 4 Encounters:   05/10/24 73.8 kg (162 lb 9.6 oz)   05/07/24 73 kg (161 lb)   10/05/23 74.9 kg (165 lb 2 oz)   09/12/23 75.3 kg (166 lb 1 oz)       Allergies:     No Known Allergies       Medications:     Current Outpatient Medications   Medication Instructions     aspirin 81 mg EC tablet 1 tablet, oral, Daily    atorvastatin (LIPITOR) 80 mg, oral, Daily    cetirizine (ZYRTEC) 10 mg, oral, Daily    ezetimibe (ZETIA) 10 mg, oral, Daily    fish oil concentrate (Omega-3) 120-180 mg capsule 1 capsule, oral, Daily    hydrocortisone 2.5 % ointment Topical, 2 times daily PRN    metoprolol succinate XL (TOPROL-XL) 25 mg, oral, Daily    multivitamin with minerals tablet 1 tablet, oral, Daily       Physical Examination:   GENERAL:  Well developed, well nourished, in no acute distress.  CHEST:  Symmetric and nontender.  NEURO/PSYCH:  Alert and oriented times three with approppriate behavior and responses.  NECK:  Supple, no JVD, no bruit.  LUNGS:  Clear to auscultation bilaterally, normal respiratory effort.  HEART:  Rate and rhythm regular with no evident murmur, no gallop appreciated.        There are no rubs, clicks or heaves.  EXTREMITIES:  Warm with good color, no clubbing or cyanosis.  There is no edema noted.  PERIPHERAL VASCULAR:  Pulses present and equally palpable; 2+ throughout.      Lab:     CBC:   Lab Results   Component Value Date    WBC 6.4 04/05/2023    RBC 4.36 (L) 04/05/2023    HGB 14.6 04/05/2023    HCT 43.0 04/05/2023     04/05/2023        CMP:    Lab Results   Component Value Date     04/05/2023    K 4.4 04/05/2023     04/05/2023    CO2 28 04/05/2023    BUN 19 04/05/2023    CREATININE 1.04 04/05/2023    GLUCOSE 89 04/05/2023    CALCIUM 9.4 04/05/2023       Magnesium:    Lab Results   Component Value Date    MG 1.40 (L) 11/11/2022       Lipid Profile:    Lab Results   Component Value Date    TRIG 109 04/05/2023    HDL 45.5 04/05/2023    LDLDIRECT 85 04/26/2022       PT/INR:    Lab Results   Component Value Date    PROTIME 12.3 11/11/2022    INR 1.1 11/11/2022       BMP:  Lab Results   Component Value Date     04/05/2023     11/11/2022     11/04/2022    K 4.4 04/05/2023    K 4.2 11/11/2022    K 4.0 11/04/2022     04/05/2023      11/11/2022     11/04/2022    CO2 28 04/05/2023    CO2 25 11/11/2022    CO2 28 11/04/2022    BUN 19 04/05/2023    BUN 17 11/11/2022    BUN 12 11/04/2022    CREATININE 1.04 04/05/2023    CREATININE 1.04 11/11/2022    CREATININE 0.90 11/04/2022       CBC:  Lab Results   Component Value Date    WBC 6.4 04/05/2023    WBC 9.3 11/11/2022    WBC 7.4 11/04/2022    RBC 4.36 (L) 04/05/2023    RBC 3.61 (L) 11/11/2022    RBC 4.25 (L) 11/04/2022    HGB 14.6 04/05/2023    HGB 12.6 (L) 11/11/2022    HGB 14.6 11/04/2022    HCT 43.0 04/05/2023    HCT 36.0 (L) 11/11/2022    HCT 42.5 11/04/2022    MCV 99 04/05/2023     11/11/2022     11/04/2022    MCHC 34.0 04/05/2023    MCHC 35.0 11/11/2022    MCHC 34.4 11/04/2022    RDW 12.7 04/05/2023    RDW 12.5 11/11/2022    RDW 12.7 11/04/2022     04/05/2023     (L) 11/11/2022     11/04/2022       Hepatic Function Panel:    Lab Results   Component Value Date    ALKPHOS 57 04/05/2023    ALT 30 04/05/2023    AST 36 04/05/2023    PROT 6.9 04/05/2023    BILITOT 0.9 04/05/2023         Problem List:     Patient Active Problem List   Diagnosis    CAD S/P percutaneous coronary angioplasty    Dyslipidemia    Hypertension    Prostate cancer (Multi)    Routine general medical examination at health care facility    Cardiomyopathy, ischemic       Asessment:     Problem List Items Addressed This Visit             ICD-10-CM    Cardiomyopathy, ischemic I25.5    Relevant Medications    sacubitriL-valsartan (Entresto) 24-26 mg tablet    empagliflozin (Jardiance) 10 mg    Other Relevant Orders    Zoll Lifevest    Follow Up In Cardiology    Follow Up In Cardiology    Basic metabolic panel    MR cardiac morphology and function wo IV contrast    MR cardiac morphology and function w and wo IV contrast    Referral to Cardiac Electrophysiology     Other Visit Diagnoses         Codes    Chronic systolic heart failure (Multi)     I50.22    Relevant Medications     sacubitriL-valsartan (Entresto) 24-26 mg tablet    empagliflozin (Jardiance) 10 mg    Other Relevant Orders    Zoll Lifevest    Follow Up In Cardiology    Follow Up In Cardiology    Basic metabolic panel    MR cardiac morphology and function wo IV contrast    MR cardiac morphology and function w and wo IV contrast    Referral to Cardiac Electrophysiology              Plan:

## 2024-05-13 ENCOUNTER — LAB (OUTPATIENT)
Dept: LAB | Facility: LAB | Age: 74
End: 2024-05-13
Payer: MEDICARE

## 2024-05-13 DIAGNOSIS — Z98.61 CAD S/P PERCUTANEOUS CORONARY ANGIOPLASTY: ICD-10-CM

## 2024-05-13 DIAGNOSIS — I50.22 CHRONIC SYSTOLIC HEART FAILURE (MULTI): ICD-10-CM

## 2024-05-13 DIAGNOSIS — I25.5 CARDIOMYOPATHY, ISCHEMIC: ICD-10-CM

## 2024-05-13 DIAGNOSIS — I25.10 CAD S/P PERCUTANEOUS CORONARY ANGIOPLASTY: ICD-10-CM

## 2024-05-13 DIAGNOSIS — Z13.29 THYROID DISORDER SCREEN: ICD-10-CM

## 2024-05-13 DIAGNOSIS — C61 PROSTATE CANCER (MULTI): ICD-10-CM

## 2024-05-13 PROBLEM — I25.2 OLD INFERIOR WALL MYOCARDIAL INFARCTION: Status: ACTIVE | Noted: 2024-05-13

## 2024-05-13 LAB
ALBUMIN SERPL BCP-MCNC: 4.4 G/DL (ref 3.4–5)
ALP SERPL-CCNC: 50 U/L (ref 33–136)
ALT SERPL W P-5'-P-CCNC: 27 U/L (ref 10–52)
ANION GAP SERPL CALC-SCNC: 13 MMOL/L (ref 10–20)
AST SERPL W P-5'-P-CCNC: 30 U/L (ref 9–39)
BASOPHILS # BLD AUTO: 0.05 X10*3/UL (ref 0–0.1)
BASOPHILS NFR BLD AUTO: 0.7 %
BILIRUB SERPL-MCNC: 0.8 MG/DL (ref 0–1.2)
BUN SERPL-MCNC: 23 MG/DL (ref 6–23)
CALCIUM SERPL-MCNC: 9.8 MG/DL (ref 8.6–10.3)
CHLORIDE SERPL-SCNC: 106 MMOL/L (ref 98–107)
CHOLEST SERPL-MCNC: 123 MG/DL (ref 0–199)
CHOLESTEROL/HDL RATIO: 2.6
CO2 SERPL-SCNC: 29 MMOL/L (ref 21–32)
CREAT SERPL-MCNC: 1.17 MG/DL (ref 0.5–1.3)
EGFRCR SERPLBLD CKD-EPI 2021: 65 ML/MIN/1.73M*2
EOSINOPHIL # BLD AUTO: 0.3 X10*3/UL (ref 0–0.4)
EOSINOPHIL NFR BLD AUTO: 4 %
ERYTHROCYTE [DISTWIDTH] IN BLOOD BY AUTOMATED COUNT: 13 % (ref 11.5–14.5)
GLUCOSE SERPL-MCNC: 98 MG/DL (ref 74–99)
HCT VFR BLD AUTO: 42.5 % (ref 41–52)
HDLC SERPL-MCNC: 47.3 MG/DL
HGB BLD-MCNC: 14.5 G/DL (ref 13.5–17.5)
IMM GRANULOCYTES # BLD AUTO: 0.01 X10*3/UL (ref 0–0.5)
IMM GRANULOCYTES NFR BLD AUTO: 0.1 % (ref 0–0.9)
LDLC SERPL CALC-MCNC: 53 MG/DL
LYMPHOCYTES # BLD AUTO: 2.85 X10*3/UL (ref 0.8–3)
LYMPHOCYTES NFR BLD AUTO: 38.3 %
MCH RBC QN AUTO: 34.3 PG (ref 26–34)
MCHC RBC AUTO-ENTMCNC: 34.1 G/DL (ref 32–36)
MCV RBC AUTO: 101 FL (ref 80–100)
MONOCYTES # BLD AUTO: 0.54 X10*3/UL (ref 0.05–0.8)
MONOCYTES NFR BLD AUTO: 7.3 %
NEUTROPHILS # BLD AUTO: 3.69 X10*3/UL (ref 1.6–5.5)
NEUTROPHILS NFR BLD AUTO: 49.6 %
NON HDL CHOLESTEROL: 76 MG/DL (ref 0–149)
NRBC BLD-RTO: 0 /100 WBCS (ref 0–0)
PLATELET # BLD AUTO: 151 X10*3/UL (ref 150–450)
POTASSIUM SERPL-SCNC: 4.5 MMOL/L (ref 3.5–5.3)
PROT SERPL-MCNC: 6.9 G/DL (ref 6.4–8.2)
PSA SERPL-MCNC: <0.01 NG/ML
RBC # BLD AUTO: 4.23 X10*6/UL (ref 4.5–5.9)
SODIUM SERPL-SCNC: 143 MMOL/L (ref 136–145)
TRIGL SERPL-MCNC: 114 MG/DL (ref 0–149)
TSH SERPL-ACNC: 1.37 MIU/L (ref 0.44–3.98)
VLDL: 23 MG/DL (ref 0–40)
WBC # BLD AUTO: 7.4 X10*3/UL (ref 4.4–11.3)

## 2024-05-13 PROCEDURE — 80053 COMPREHEN METABOLIC PANEL: CPT

## 2024-05-13 PROCEDURE — 84443 ASSAY THYROID STIM HORMONE: CPT

## 2024-05-13 PROCEDURE — 85025 COMPLETE CBC W/AUTO DIFF WBC: CPT

## 2024-05-13 PROCEDURE — 84153 ASSAY OF PSA TOTAL: CPT

## 2024-05-13 PROCEDURE — 80061 LIPID PANEL: CPT

## 2024-05-13 PROCEDURE — 36415 COLL VENOUS BLD VENIPUNCTURE: CPT

## 2024-05-14 ENCOUNTER — TELEPHONE (OUTPATIENT)
Dept: CARDIOLOGY | Facility: CLINIC | Age: 74
End: 2024-05-14
Payer: MEDICARE

## 2024-05-14 NOTE — TELEPHONE ENCOUNTER
Left detailed message on patient's personal voicemail advising of message. Encouraged patient to return the call if there are any questions or concerns. Perla Merrill MA

## 2024-05-14 NOTE — TELEPHONE ENCOUNTER
Left a message for patient on voicemail asking to return call to office in regards to his results.

## 2024-05-14 NOTE — TELEPHONE ENCOUNTER
----- Message from Juan Sharma MD sent at 5/14/2024 12:27 PM EDT -----  Labs reviewed and are normal.  Continue same and follow-up as scheduled.  Thanks.

## 2024-05-23 ENCOUNTER — OFFICE VISIT (OUTPATIENT)
Dept: CARDIOLOGY | Facility: CLINIC | Age: 74
End: 2024-05-23
Payer: MEDICARE

## 2024-05-23 VITALS
BODY MASS INDEX: 26.2 KG/M2 | HEART RATE: 86 BPM | HEIGHT: 66 IN | SYSTOLIC BLOOD PRESSURE: 116 MMHG | WEIGHT: 163 LBS | DIASTOLIC BLOOD PRESSURE: 62 MMHG

## 2024-05-23 DIAGNOSIS — Z78.9 NEVER SMOKED TOBACCO: ICD-10-CM

## 2024-05-23 DIAGNOSIS — R94.31 ABNORMAL EKG: Primary | ICD-10-CM

## 2024-05-23 DIAGNOSIS — I25.5 CARDIOMYOPATHY, ISCHEMIC: ICD-10-CM

## 2024-05-23 DIAGNOSIS — I50.22 CHRONIC SYSTOLIC HEART FAILURE (MULTI): ICD-10-CM

## 2024-05-23 PROCEDURE — 93000 ELECTROCARDIOGRAM COMPLETE: CPT | Performed by: INTERNAL MEDICINE

## 2024-05-23 PROCEDURE — 1036F TOBACCO NON-USER: CPT | Performed by: INTERNAL MEDICINE

## 2024-05-23 PROCEDURE — 3074F SYST BP LT 130 MM HG: CPT | Performed by: INTERNAL MEDICINE

## 2024-05-23 PROCEDURE — 99215 OFFICE O/P EST HI 40 MIN: CPT | Performed by: INTERNAL MEDICINE

## 2024-05-23 PROCEDURE — 3078F DIAST BP <80 MM HG: CPT | Performed by: INTERNAL MEDICINE

## 2024-05-23 PROCEDURE — 1157F ADVNC CARE PLAN IN RCRD: CPT | Performed by: INTERNAL MEDICINE

## 2024-05-23 PROCEDURE — 1123F ACP DISCUSS/DSCN MKR DOCD: CPT | Performed by: INTERNAL MEDICINE

## 2024-05-23 PROCEDURE — 1159F MED LIST DOCD IN RCRD: CPT | Performed by: INTERNAL MEDICINE

## 2024-05-23 PROCEDURE — 1160F RVW MEDS BY RX/DR IN RCRD: CPT | Performed by: INTERNAL MEDICINE

## 2024-05-23 NOTE — PROGRESS NOTES
CARDIOLOGY OFFICE VISIT      CHIEF COMPLAINT  Chief Complaint   Patient presents with    New Patient Visit    [facility]    Med Refill     Entresto and Jardiance        HISTORY OF PRESENT ILLNESS  HPI  73-year-old male with a history of atherosclerotic heart disease with remote STEMI in November 2006. He underwent angioplasty and stenting of the RCA at that time. An echocardiogram in 2006 showed inferolateral wall hypokinesis and estimated LV ejection fraction of 45%. Aortic valve appeared to be bicuspid. A stress myocardial perfusion study showed mild inferior wall myocardial ischemia versus shifting diaphragmatic attenuation artifact. Calculated LV ejection fraction 44%. He has a history of hypertension, hyperlipidemia, and carotid artery disease.     In September 2023, a repeat echocardiogram showed an estimated LV ejection fraction of 30% with severe hypokinesis of the inferior and inferolateral walls. The distal anterior wall was hypokinetic. Right ventricular chamber size and function are normal. Valvular structure and function were normal. Carotid ultrasound October 5, 2023 showed 50 to 69% stenosis in the right proximal internal carotid artery and greater than 50% stenosis of the right external carotid artery. There was 50 to 69% stenosis of the left internal carotid artery. A myocardial perfusion study on December 4, 2023 showed moderate inferolateral myocardial infarction extends from apex to base. No evidence of myocardial ischemia by perfusion imaging. There was severe LV dysfunction with calculated LV ejection fraction of 29%. Moderate to high risk study though no significant ischemia identified.       Stress test December 2023  IMPRESSION:  Abnormal exercise Myoview cardiac perfusion stress test.  Moderate inferolateral myocardial infarction extending from apex to  base. No evidence of ischemia or myocardial infarction by perfusion  imaging. Severe left ventricular systolic dysfunction, ejection  fraction  29%. No exercise provoked significant ischemic ECG changes  or chest pain symptoms. Noninvasive risk stratification: Moderate to  high risk though no significant ischemia identified. No previous  available for comparison.    Echocardiogram October 2023    CONCLUSIONS:   1. Left ventricular systolic function is severely decreased with a 30% estimated ejection fraction.   2. There is severe hypokinesis of inferior and inferolateral walls. The basal portion of inferior wall, akinetic. The distal anterior wall is mildly hyopkinetic as is distal anterolateral wall. There is some post extrasystolic potentiation and frequent pvc's.   3. Normal RV size and function      RVSP could not be calculated as no TR doppler envelope.   4. Left ventricular cavity size is moderately dilated.   5. Normal valve structure and function.   6. There are multiple wall motion abnormalities.  Patient states that so far he has been doing well.  He denies any symptoms of chest pain or shortness of palpitations.    Patient was recently seen by cardiology service May 3, 2024 at that time, patient was placed on Jardiance, Entresto and also Toprol-XL.    Patient is currently using a LifeVest.    EKG performed today shows sinus rhythm at a rate of 86 bpm QRS ration 90 ms QT corrected 437 ms.  Rhythm strip shows the same pattern.         Past Medical History  Past Medical History:   Diagnosis Date    Right foot drop 02/17/2023       Social History  Social History     Tobacco Use    Smoking status: Never    Smokeless tobacco: Never   Vaping Use    Vaping status: Never Used   Substance Use Topics    Alcohol use: Not Currently     Alcohol/week: 1.0 standard drink of alcohol     Types: 1 Glasses of wine per week    Drug use: Never       Family History     Family History   Problem Relation Name Age of Onset    Hypertension Other Family history         Allergies:  No Known Allergies     Outpatient Medications:  Current Outpatient Medications   Medication  Instructions    aspirin 81 mg EC tablet 1 tablet, oral, Daily    atorvastatin (LIPITOR) 80 mg, oral, Daily    cetirizine (ZYRTEC) 10 mg, oral, Daily    empagliflozin (JARDIANCE) 10 mg, oral, Daily    ezetimibe (ZETIA) 10 mg, oral, Daily    fish oil concentrate (Omega-3) 120-180 mg capsule 1 capsule, oral, Daily    hydrocortisone 2.5 % ointment Topical, 2 times daily PRN    metoprolol succinate XL (TOPROL-XL) 25 mg, oral, Daily    multivitamin with minerals tablet 1 tablet, oral, Daily    sacubitriL-valsartan (Entresto) 24-26 mg tablet 1 tablet, oral, 2 times daily          REVIEW OF SYSTEMS  Review of Systems   All other systems reviewed and are negative.        VITALS  Vitals:    05/23/24 1023   BP: 116/62   Pulse: 86       PHYSICAL EXAM  Constitutional:       Appearance: Healthy appearance. Not in distress.   Neck:      Vascular: No JVR. JVD normal.   Pulmonary:      Effort: Pulmonary effort is normal.      Breath sounds: Normal breath sounds. No wheezing. No rhonchi. No rales.   Chest:      Chest wall: Not tender to palpatation.   Cardiovascular:      PMI at left midclavicular line. Normal rate. Regular rhythm. Normal S1. Normal S2.       Murmurs: There is no murmur.      No gallop.  No click. No rub.   Pulses:     Intact distal pulses.   Edema:     Peripheral edema absent.   Abdominal:      General: Bowel sounds are normal.      Palpations: Abdomen is soft.      Tenderness: There is no abdominal tenderness.   Musculoskeletal: Normal range of motion.         General: No tenderness. Skin:     General: Skin is warm and dry.   Neurological:      General: No focal deficit present.      Mental Status: Alert and oriented to person, place and time.           ASSESSMENT AND PLAN  Clinical impression    1.  Cardiomyopathy with a left ventricular action fraction of 30% (September 2023  2.  Congestive heart failure, [place] heart association class II  3.  Evidence of ischemia per stress test in 2023  4.  History of  coronary artery disease-ST elevation myocardial infarction in 2006 status post PCI of the right coronary artery.    Plan recommendations    I had a lengthy discussion with patient and family member regarding plan to follow for management of cardiomyopathy-congestive heart failure and sudden cardiac death.  I agree with LifeVest for now.    Patient just started medical therapy for heart failure in May 10th, 2024.  He will need to be reevaluated with a cardiac MRI in 3 months to see if there is any improvement of left ventricular ejection fraction.    Patient is to continue with LifeVest.  We will obtain an echocardiogram limited for left ventricular ejection fraction in the next 2 weeks.    Patient is to have a close follow-up with general cardiology service for adjustment of heart failure therapy if needed.    Patient also will be set up for cardiac catheterization for revascularization if possible in the next few days.  He needs to hold Jardiance for 3 days.    Follow my office after cardiac MRI has been performed in August 2024    Risk factor modification and lifestyle modification discussed with patient. Diet , exercise and hydration discussed with patient.    I have personally review with patient during this office visit, laboratory data, echocardiogram results, stress test results, Holter-event monitor results prior and after the last electrophysiology visit. All questions has been answered.    Please excuse any errors in grammar or translation related to this dictation.  Voice recognition software was utilized to prepare this document.          Scribe Attestation  By signing my name below, I, Charla Ames CMA   , Scribe   attest that this documentation has been prepared under the direction and in the presence of Aaron Hutton MD.

## 2024-05-23 NOTE — PATIENT INSTRUCTIONS
HOLD JARDIANCE 3 DAYS PRIOR TO YOUR HEART CATH    YOU WILL BE NOTHING BY MOUTH MIDNIGHT THE NIGHT BEFORE YOUR PROCEDURE.  YOU CAN TAKE YOUR MORNING MEDICATIONS WITH THE EXCEPTION OF JARDIANCE(SHOULD BE ON HOLD) WITH A SMALL SIP OF WATER.        Please bring all medicines, vitamins, and herbal supplements with you in original bottles to every appointment! This is the best way to ensure your medication list in your chart is accurate.    Prescriptions will not be filled unless you are compliant with your follow up appointments or have a follow up appointment scheduled as per instruction of your physician. Refills should be requested at the time of your visit.

## 2024-05-24 ENCOUNTER — APPOINTMENT (OUTPATIENT)
Dept: CARDIOLOGY | Facility: CLINIC | Age: 74
End: 2024-05-24
Payer: MEDICARE

## 2024-05-29 ENCOUNTER — ANCILLARY PROCEDURE (OUTPATIENT)
Dept: CARDIOLOGY | Facility: HOSPITAL | Age: 74
End: 2024-05-29
Payer: MEDICARE

## 2024-05-29 DIAGNOSIS — I50.22 CHRONIC SYSTOLIC HEART FAILURE (MULTI): ICD-10-CM

## 2024-05-29 DIAGNOSIS — I25.5 CARDIOMYOPATHY, ISCHEMIC: ICD-10-CM

## 2024-05-29 LAB
AORTIC VALVE MEAN GRADIENT: 3 MMHG
AORTIC VALVE PEAK VELOCITY: 1.06 M/S
AV PEAK GRADIENT: 4.5 MMHG
AVA (PEAK VEL): 2.3 CM2
AVA (VTI): 2.04 CM2
EJECTION FRACTION APICAL 4 CHAMBER: 29
LEFT VENTRICLE INTERNAL DIMENSION DIASTOLE: 5.6 CM (ref 3.5–6)
LEFT VENTRICULAR OUTFLOW TRACT DIAMETER: 2.3 CM
LV EJECTION FRACTION BIPLANE: 30 %
MITRAL VALVE E/A RATIO: 0.78
RIGHT VENTRICLE PEAK SYSTOLIC PRESSURE: 31.1 MMHG

## 2024-05-29 PROCEDURE — 93308 TTE F-UP OR LMTD: CPT

## 2024-05-29 PROCEDURE — 93308 TTE F-UP OR LMTD: CPT | Performed by: INTERNAL MEDICINE

## 2024-05-31 ENCOUNTER — OFFICE VISIT (OUTPATIENT)
Dept: CARDIOLOGY | Facility: CLINIC | Age: 74
End: 2024-05-31
Payer: MEDICARE

## 2024-05-31 ENCOUNTER — HOSPITAL ENCOUNTER (OUTPATIENT)
Dept: RADIOLOGY | Facility: CLINIC | Age: 74
End: 2024-05-31
Payer: MEDICARE

## 2024-05-31 VITALS
HEIGHT: 66 IN | WEIGHT: 164 LBS | SYSTOLIC BLOOD PRESSURE: 126 MMHG | DIASTOLIC BLOOD PRESSURE: 68 MMHG | HEART RATE: 66 BPM | BODY MASS INDEX: 26.36 KG/M2

## 2024-05-31 DIAGNOSIS — Z78.9 NEVER SMOKED TOBACCO: ICD-10-CM

## 2024-05-31 DIAGNOSIS — I25.5 CARDIOMYOPATHY, ISCHEMIC: ICD-10-CM

## 2024-05-31 DIAGNOSIS — I50.22 CHRONIC SYSTOLIC HEART FAILURE (MULTI): ICD-10-CM

## 2024-05-31 DIAGNOSIS — Z51.89 ENCOUNTER FOR MEDICATION ADJUSTMENT: Primary | ICD-10-CM

## 2024-05-31 PROCEDURE — 1157F ADVNC CARE PLAN IN RCRD: CPT | Performed by: NURSE PRACTITIONER

## 2024-05-31 PROCEDURE — 3074F SYST BP LT 130 MM HG: CPT | Performed by: NURSE PRACTITIONER

## 2024-05-31 PROCEDURE — 1123F ACP DISCUSS/DSCN MKR DOCD: CPT | Performed by: NURSE PRACTITIONER

## 2024-05-31 PROCEDURE — 3078F DIAST BP <80 MM HG: CPT | Performed by: NURSE PRACTITIONER

## 2024-05-31 PROCEDURE — 1036F TOBACCO NON-USER: CPT | Performed by: NURSE PRACTITIONER

## 2024-05-31 PROCEDURE — 1160F RVW MEDS BY RX/DR IN RCRD: CPT | Performed by: NURSE PRACTITIONER

## 2024-05-31 PROCEDURE — 1159F MED LIST DOCD IN RCRD: CPT | Performed by: NURSE PRACTITIONER

## 2024-05-31 PROCEDURE — 99214 OFFICE O/P EST MOD 30 MIN: CPT | Performed by: NURSE PRACTITIONER

## 2024-05-31 NOTE — PATIENT INSTRUCTIONS
Your Entresto dose has been increased to 49/51 mg 1 tablet twice day.  You may take Entresto 24/26 mg 2 tablets twice day to total the new dose of 49/51.    Have labs drawn a couple days prior to your heart cath

## 2024-05-31 NOTE — PROGRESS NOTES
Mario Pringle is a 73 y.o. male that presents to the office today for cardiac follow up.  He follows with his  primary cardiologist Dr. Sharma  and Dr. Hutton and was added to my schedule today.      Per Dr. Sharma's office notes,  He has a history of atherosclerotic heart disease with remote STEMI in November 2006. He underwent angioplasty and stenting of the RCA at that time. An echocardiogram in 2006 showed inferolateral wall hypokinesis and estimated LV ejection fraction of 45%. Aortic valve appeared to be bicuspid. A stress myocardial perfusion study showed mild inferior wall myocardial ischemia versus shifting diaphragmatic attenuation artifact. Calculated LV ejection fraction 44%. He has a history of hypertension, hyperlipidemia, and carotid artery disease. He has been followed on a regular basis by Dr. Berry Childress. He is maintained on aspirin, atorvastatin, and Toprol-XL.     At the time of his initial visit with me on September 12, 2023 he was doing well.  He takes a brisk 5 to 6 mile walk daily.  He was scheduled for a repeat carotid ultrasound as well as an echocardiogram to reassess LV ejection fraction and reported bicuspid aortic valve.  The echocardiogram showed an estimated LV ejection fraction of 30% with severe hypokinesis of the inferior and inferolateral walls.  The distal anterior wall was hypokinetic.  Right ventricular chamber size and function are normal.  Valvular structure and function were normal.  Carotid ultrasound October 5, 2023 showed 50 to 69% stenosis in the right proximal internal carotid artery and greater than 50% stenosis of the right external carotid artery.  There was 50 to 69% stenosis of the left internal carotid artery.  A myocardial perfusion study on December 4, 2023 showed moderate inferolateral myocardial infarction extends from apex to base.  No evidence of myocardial ischemia by perfusion imaging.  There was severe LV dysfunction with calculated LV ejection  fraction of 29%.  Moderate to high risk study though no significant ischemia identified.     At the time of his last office visit with Dr. Sharma 5/10/2024 results of the echocardiogram and stress test were discussed.   suspected extensive inferior myocardial infarction sometime ago.  No ischemia was identified on the stress test and he is not having any active anginal or CHF symptoms.  He will continue on Toprol-XL.  He was advised to start Entresto 24/26 mg twice daily.  He will start Jardiance 10 mg daily.  Titrate Entresto upward as tolerated.   He is currently wearing a LifeVest.  He has been evaluated by Dr. Hutton for cardiomyopathy and is scheduled to undergo a left heart catheterization with Dr. Nieves at [facility] 6/6/20/2024.     Overall he states that he feels well and has no symptoms of decreased heart function.  He states he continues to walk 6 miles a day without any noted chest pain or shortness of breath.  He states he is tolerating  Entresto and Jardiance without any negative side effects.    Testing reviewed  5/13/2024 labs  , K + 4.5, BUN 23, creatinine 1.17, ALT 27, AST 30, cholesterol 123, HDL 47, LDL 53, triglycerides 114, TSH 1.37, PSA < 0.01, WBC 7.4, Hgb 14.5, HCT 42.5, platelets 151.    Assessment/Plan  Ischemic cardiomyopathy; tolerating Jardiance and Entresto well.  Will increase current Entresto dose to 49/51 1 tablet twice a day.  Samples have been provided in office today.  Continue Jardiance 10 mg daily  Continue with LifeVest.  Pending cardiac MRI  Pending left heart catheterization 6/6/2024.  He has been instructed to hold his Jardiance 10 mg 3 days prior to left heart catheterization which she verbalizes understanding.  Obtain basic metabolic panel to assess electrolyte and renal function prior to heart catheterization  Follow-up in the office with Dr. Sharma as previously scheduled or sooner if needed.      Patient Active Problem List   Diagnosis    Atherosclerosis of native  coronary artery of native heart without angina pectoris    Dyslipidemia    Hypertension    Prostate cancer (Multi)    Routine general medical examination at health care facility    Cardiomyopathy, ischemic    H/O percutaneous transluminal coronary angioplasty    Old inferior wall myocardial infarction    Chronic systolic heart failure (Multi)    Abnormal EKG    BMI 26.0-26.9,adult    Never smoked tobacco    Encounter for medication adjustment       Social History     Tobacco Use    Smoking status: Never    Smokeless tobacco: Never   Vaping Use    Vaping status: Never Used   Substance Use Topics    Alcohol use: Not Currently     Alcohol/week: 1.0 standard drink of alcohol     Types: 1 Glasses of wine per week     Comment: on occ    Drug use: Never       Past Medical History:   Diagnosis Date    Right foot drop 02/17/2023         Current Outpatient Medications:     aspirin 81 mg EC tablet, Take 1 tablet (81 mg) by mouth once daily., Disp: , Rfl:     atorvastatin (Lipitor) 80 mg tablet, Take 1 tablet (80 mg) by mouth once daily., Disp: 90 tablet, Rfl: 2    empagliflozin (Jardiance) 10 mg, Take 1 tablet (10 mg) by mouth once daily., Disp: 30 tablet, Rfl: 11    ezetimibe (Zetia) 10 mg tablet, Take 1 tablet (10 mg) by mouth once daily., Disp: , Rfl:     fish oil concentrate (Omega-3) 120-180 mg capsule, Take 1 capsule (1 g) by mouth once daily., Disp: , Rfl:     metoprolol succinate XL (Toprol-XL) 25 mg 24 hr tablet, Take 1 tablet (25 mg) by mouth once daily., Disp: 90 tablet, Rfl: 1    multivitamin with minerals tablet, Take 1 tablet by mouth once daily., Disp: , Rfl:     empagliflozin (Jardiance) 10 mg, Take 1 tablet (10 mg) by mouth once daily. LOT#  [id number] EXP 1/26 (Patient not taking: Reported on 5/31/2024), Disp: 14 tablet, Rfl: 0    sacubitriL-valsartan (Entresto) 49-51 mg tablet, Take 1 tablet by mouth 2 times a day., Disp: 60 tablet, Rfl: 12    sacubitriL-valsartan (Entresto) 49-51 mg tablet, Take 1 tablet by  "mouth 2 times a day., Disp: 28 tablet, Rfl: 0    Patient has no known allergies.    Family History   Problem Relation Name Age of Onset    Hypertension Other Family history        Past Surgical History:   Procedure Laterality Date    US ASPIRATION INJECTION INTERMEDIATE JOINT  3/16/2022     ASPIRATION INJECTION INTERMEDIATE JOINT 3/16/2022 [facility]          Review of systems  Constitutional: No weight loss, fever, chills, weakness or fatigue  HEENT: No visual loss, blurred vision, double vision or yellow sclerae  Skin: No rash or itching  Cardiovascular: No chest pain, pressure or discomfort, No palpitations or edema.  Respiratory: No shortness of breath, cough or sputum  Gastrointestinal: No nausea, vomiting or diarrhea. No bloody or dark tarry stools.  Neurological: No headache, lightheadedness, dizziness, syncope.   Musculoskeletal: No muscle, back pain, joint pain or stiffness.  Hematologic: No anemia, bleeding or bruising.    /68 (BP Location: Right arm, Patient Position: Sitting)   Pulse 66   Ht 1.676 m (5' 6\")   Wt 74.4 kg (164 lb)   BMI 26.47 kg/m²     Patient Active Problem List   Diagnosis    Atherosclerosis of native coronary artery of native heart without angina pectoris    Dyslipidemia    Hypertension    Prostate cancer (Multi)    Routine general medical examination at health care facility    Cardiomyopathy, ischemic    H/O percutaneous transluminal coronary angioplasty    Old inferior wall myocardial infarction    Chronic systolic heart failure (Multi)    Abnormal EKG    BMI 26.0-26.9,adult    Never smoked tobacco    Encounter for medication adjustment         Physical Exam  Constitutional: Well developed, awake/alert x 3, no distress.  Head/Neck: No JVD, No bruits  Respiratory/Thorax: patent airways, CTAB, normal breath sounds with good expansion.  Cardiovascular: Regular rate and rhythm, no murmurs, normal S1 and S2,   Gastrointestinal: Non distended, soft, non-tender, no " rebound tenderness or guarding.  Extremities: No cyanosis, edema.    Neurological: Alert and oriented x 3. Moves extremities spontaneous with purpose.  Psychological: Appropriate mood and behavior  Skin: Warm and Dry. No lesions or rashes.         Please excuse any errors in grammar or translation related to dictation, voice recognition software was used to prepare this document.        Meds verified via med list.

## 2024-06-03 ENCOUNTER — LAB (OUTPATIENT)
Dept: LAB | Facility: LAB | Age: 74
End: 2024-06-03
Payer: MEDICARE

## 2024-06-03 DIAGNOSIS — R94.31 ABNORMAL EKG: ICD-10-CM

## 2024-06-03 DIAGNOSIS — I25.5 CARDIOMYOPATHY, ISCHEMIC: ICD-10-CM

## 2024-06-03 DIAGNOSIS — I50.22 CHRONIC SYSTOLIC HEART FAILURE (MULTI): ICD-10-CM

## 2024-06-03 LAB
ANION GAP SERPL CALC-SCNC: 9 MMOL/L (ref 10–20)
BUN SERPL-MCNC: 19 MG/DL (ref 6–23)
CALCIUM SERPL-MCNC: 9.4 MG/DL (ref 8.6–10.3)
CHLORIDE SERPL-SCNC: 105 MMOL/L (ref 98–107)
CO2 SERPL-SCNC: 29 MMOL/L (ref 21–32)
CREAT SERPL-MCNC: 1.08 MG/DL (ref 0.5–1.3)
EGFRCR SERPLBLD CKD-EPI 2021: 72 ML/MIN/1.73M*2
GLUCOSE SERPL-MCNC: 100 MG/DL (ref 74–99)
INR PPP: 1.1 (ref 0.9–1.1)
POTASSIUM SERPL-SCNC: 4.3 MMOL/L (ref 3.5–5.3)
PROTHROMBIN TIME: 12 SECONDS (ref 9.8–12.8)
SODIUM SERPL-SCNC: 139 MMOL/L (ref 136–145)

## 2024-06-03 PROCEDURE — 85610 PROTHROMBIN TIME: CPT

## 2024-06-03 PROCEDURE — 80048 BASIC METABOLIC PNL TOTAL CA: CPT

## 2024-06-03 PROCEDURE — 36415 COLL VENOUS BLD VENIPUNCTURE: CPT

## 2024-06-05 PROBLEM — C80.1 CANCER (MULTI): Status: ACTIVE | Noted: 2024-06-05

## 2024-06-05 RX ORDER — ASPIRIN 325 MG
325 TABLET ORAL ONCE
Status: CANCELLED | OUTPATIENT
Start: 2024-06-05 | End: 2024-06-05

## 2024-06-06 ENCOUNTER — HOSPITAL ENCOUNTER (OUTPATIENT)
Facility: HOSPITAL | Age: 74
Setting detail: OUTPATIENT SURGERY
Discharge: HOME | End: 2024-06-06
Attending: INTERNAL MEDICINE | Admitting: INTERNAL MEDICINE
Payer: MEDICARE

## 2024-06-06 ENCOUNTER — APPOINTMENT (OUTPATIENT)
Dept: CARDIOLOGY | Facility: HOSPITAL | Age: 74
End: 2024-06-06
Payer: MEDICARE

## 2024-06-06 ENCOUNTER — HOSPITAL ENCOUNTER (OUTPATIENT)
Dept: CARDIOLOGY | Facility: HOSPITAL | Age: 74
Setting detail: OUTPATIENT SURGERY
Discharge: HOME | End: 2024-06-06
Payer: MEDICARE

## 2024-06-06 VITALS
RESPIRATION RATE: 16 BRPM | DIASTOLIC BLOOD PRESSURE: 55 MMHG | TEMPERATURE: 97.5 F | HEIGHT: 69 IN | HEART RATE: 86 BPM | SYSTOLIC BLOOD PRESSURE: 95 MMHG | BODY MASS INDEX: 23.51 KG/M2 | OXYGEN SATURATION: 98 % | WEIGHT: 158.73 LBS

## 2024-06-06 DIAGNOSIS — I25.5 CARDIOMYOPATHY, ISCHEMIC: Primary | ICD-10-CM

## 2024-06-06 DIAGNOSIS — I50.22 CHRONIC SYSTOLIC HEART FAILURE (MULTI): ICD-10-CM

## 2024-06-06 DIAGNOSIS — R07.9 CHEST PAIN: ICD-10-CM

## 2024-06-06 DIAGNOSIS — I25.10 CAD S/P PERCUTANEOUS CORONARY ANGIOPLASTY: ICD-10-CM

## 2024-06-06 DIAGNOSIS — R94.31 ABNORMAL EKG: ICD-10-CM

## 2024-06-06 DIAGNOSIS — I42.9 CARDIOMYOPATHY, UNSPECIFIED (MULTI): ICD-10-CM

## 2024-06-06 DIAGNOSIS — Z98.61 CAD S/P PERCUTANEOUS CORONARY ANGIOPLASTY: ICD-10-CM

## 2024-06-06 PROCEDURE — 2500000005 HC RX 250 GENERAL PHARMACY W/O HCPCS: Performed by: INTERNAL MEDICINE

## 2024-06-06 PROCEDURE — 85347 COAGULATION TIME ACTIVATED: CPT | Performed by: INTERNAL MEDICINE

## 2024-06-06 PROCEDURE — 93005 ELECTROCARDIOGRAM TRACING: CPT | Mod: 59

## 2024-06-06 PROCEDURE — C1874 STENT, COATED/COV W/DEL SYS: HCPCS | Performed by: INTERNAL MEDICINE

## 2024-06-06 PROCEDURE — 93010 ELECTROCARDIOGRAM REPORT: CPT | Performed by: INTERNAL MEDICINE

## 2024-06-06 PROCEDURE — 2780000003 HC OR 278 NO HCPCS: Performed by: INTERNAL MEDICINE

## 2024-06-06 PROCEDURE — 99152 MOD SED SAME PHYS/QHP 5/>YRS: CPT | Performed by: INTERNAL MEDICINE

## 2024-06-06 PROCEDURE — 93458 L HRT ARTERY/VENTRICLE ANGIO: CPT | Performed by: INTERNAL MEDICINE

## 2024-06-06 PROCEDURE — C1894 INTRO/SHEATH, NON-LASER: HCPCS | Performed by: INTERNAL MEDICINE

## 2024-06-06 PROCEDURE — 2500000001 HC RX 250 WO HCPCS SELF ADMINISTERED DRUGS (ALT 637 FOR MEDICARE OP): Performed by: NURSE PRACTITIONER

## 2024-06-06 PROCEDURE — 99153 MOD SED SAME PHYS/QHP EA: CPT | Performed by: INTERNAL MEDICINE

## 2024-06-06 PROCEDURE — C1887 CATHETER, GUIDING: HCPCS | Performed by: INTERNAL MEDICINE

## 2024-06-06 PROCEDURE — 7100000009 HC PHASE TWO TIME - INITIAL BASE CHARGE: Performed by: INTERNAL MEDICINE

## 2024-06-06 PROCEDURE — 2500000004 HC RX 250 GENERAL PHARMACY W/ HCPCS (ALT 636 FOR OP/ED): Performed by: INTERNAL MEDICINE

## 2024-06-06 PROCEDURE — C9600 PERC DRUG-EL COR STENT SING: HCPCS | Performed by: INTERNAL MEDICINE

## 2024-06-06 PROCEDURE — 92928 PRQ TCAT PLMT NTRAC ST 1 LES: CPT | Performed by: INTERNAL MEDICINE

## 2024-06-06 PROCEDURE — 2550000001 HC RX 255 CONTRASTS: Performed by: INTERNAL MEDICINE

## 2024-06-06 PROCEDURE — C1769 GUIDE WIRE: HCPCS | Performed by: INTERNAL MEDICINE

## 2024-06-06 PROCEDURE — 7100000010 HC PHASE TWO TIME - EACH INCREMENTAL 1 MINUTE: Performed by: INTERNAL MEDICINE

## 2024-06-06 PROCEDURE — C1725 CATH, TRANSLUMIN NON-LASER: HCPCS | Performed by: INTERNAL MEDICINE

## 2024-06-06 PROCEDURE — 2500000004 HC RX 250 GENERAL PHARMACY W/ HCPCS (ALT 636 FOR OP/ED): Performed by: NURSE PRACTITIONER

## 2024-06-06 PROCEDURE — 93458 L HRT ARTERY/VENTRICLE ANGIO: CPT | Mod: 59 | Performed by: INTERNAL MEDICINE

## 2024-06-06 PROCEDURE — 2720000007 HC OR 272 NO HCPCS: Performed by: INTERNAL MEDICINE

## 2024-06-06 DEVICE — STENT ONYXNG40026UX ONYX 4.00X26RX
Type: IMPLANTABLE DEVICE | Site: CORONARY | Status: FUNCTIONAL
Brand: ONYX FRONTIER™

## 2024-06-06 RX ORDER — MIDAZOLAM HYDROCHLORIDE 1 MG/ML
INJECTION, SOLUTION INTRAMUSCULAR; INTRAVENOUS AS NEEDED
Status: DISCONTINUED | OUTPATIENT
Start: 2024-06-06 | End: 2024-06-06 | Stop reason: HOSPADM

## 2024-06-06 RX ORDER — LIDOCAINE HYDROCHLORIDE 20 MG/ML
INJECTION, SOLUTION INFILTRATION; PERINEURAL AS NEEDED
Status: DISCONTINUED | OUTPATIENT
Start: 2024-06-06 | End: 2024-06-06 | Stop reason: HOSPADM

## 2024-06-06 RX ORDER — FENTANYL CITRATE 50 UG/ML
INJECTION, SOLUTION INTRAMUSCULAR; INTRAVENOUS AS NEEDED
Status: DISCONTINUED | OUTPATIENT
Start: 2024-06-06 | End: 2024-06-06 | Stop reason: HOSPADM

## 2024-06-06 RX ORDER — NITROGLYCERIN 40 MG/100ML
INJECTION INTRAVENOUS AS NEEDED
Status: DISCONTINUED | OUTPATIENT
Start: 2024-06-06 | End: 2024-06-06 | Stop reason: HOSPADM

## 2024-06-06 RX ORDER — ALUMINUM HYDROXIDE, MAGNESIUM HYDROXIDE, AND SIMETHICONE 1200; 120; 1200 MG/30ML; MG/30ML; MG/30ML
30 SUSPENSION ORAL 4 TIMES DAILY PRN
Status: DISCONTINUED | OUTPATIENT
Start: 2024-06-06 | End: 2024-06-06 | Stop reason: HOSPADM

## 2024-06-06 RX ORDER — NAPROXEN SODIUM 220 MG/1
81 TABLET, FILM COATED ORAL DAILY
Status: DISCONTINUED | OUTPATIENT
Start: 2024-06-07 | End: 2024-06-06 | Stop reason: HOSPADM

## 2024-06-06 RX ORDER — RANOLAZINE 500 MG/1
500 TABLET, EXTENDED RELEASE ORAL ONCE
Status: COMPLETED | OUTPATIENT
Start: 2024-06-06 | End: 2024-06-06

## 2024-06-06 RX ORDER — MORPHINE SULFATE 2 MG/ML
2 INJECTION, SOLUTION INTRAMUSCULAR; INTRAVENOUS
Status: DISCONTINUED | OUTPATIENT
Start: 2024-06-06 | End: 2024-06-06 | Stop reason: HOSPADM

## 2024-06-06 RX ORDER — CLOPIDOGREL BISULFATE 75 MG/1
75 TABLET ORAL DAILY
Qty: 30 TABLET | Refills: 11 | Status: SHIPPED | OUTPATIENT
Start: 2024-06-07 | End: 2025-06-07

## 2024-06-06 RX ORDER — CLOPIDOGREL BISULFATE 75 MG/1
75 TABLET ORAL DAILY
Status: DISCONTINUED | OUTPATIENT
Start: 2024-06-07 | End: 2024-06-06 | Stop reason: HOSPADM

## 2024-06-06 RX ORDER — NITROGLYCERIN 0.4 MG/1
0.4 TABLET SUBLINGUAL EVERY 5 MIN PRN
Qty: 25 TABLET | Refills: 3 | Status: SHIPPED | OUTPATIENT
Start: 2024-06-06

## 2024-06-06 RX ORDER — SODIUM CHLORIDE 9 MG/ML
50 INJECTION, SOLUTION INTRAVENOUS CONTINUOUS
Status: DISCONTINUED | OUTPATIENT
Start: 2024-06-06 | End: 2024-06-06 | Stop reason: HOSPADM

## 2024-06-06 RX ORDER — ACETAMINOPHEN 325 MG/1
650 TABLET ORAL EVERY 6 HOURS PRN
Status: DISCONTINUED | OUTPATIENT
Start: 2024-06-06 | End: 2024-06-06 | Stop reason: HOSPADM

## 2024-06-06 RX ORDER — SODIUM CHLORIDE 9 MG/ML
100 INJECTION, SOLUTION INTRAVENOUS CONTINUOUS
Status: DISCONTINUED | OUTPATIENT
Start: 2024-06-06 | End: 2024-06-06 | Stop reason: HOSPADM

## 2024-06-06 RX ORDER — NITROGLYCERIN 0.4 MG/1
0.4 TABLET SUBLINGUAL EVERY 5 MIN PRN
Status: DISCONTINUED | OUTPATIENT
Start: 2024-06-06 | End: 2024-06-06 | Stop reason: HOSPADM

## 2024-06-06 RX ORDER — HEPARIN SODIUM 1000 [USP'U]/ML
INJECTION, SOLUTION INTRAVENOUS; SUBCUTANEOUS AS NEEDED
Status: DISCONTINUED | OUTPATIENT
Start: 2024-06-06 | End: 2024-06-06 | Stop reason: HOSPADM

## 2024-06-06 RX ADMIN — RANOLAZINE 500 MG: 500 TABLET, FILM COATED, EXTENDED RELEASE ORAL at 10:18

## 2024-06-06 RX ADMIN — ACETAMINOPHEN 650 MG: 325 TABLET ORAL at 18:06

## 2024-06-06 RX ADMIN — SODIUM CHLORIDE 100 ML/HR: 9 INJECTION, SOLUTION INTRAVENOUS at 10:17

## 2024-06-06 ASSESSMENT — COLUMBIA-SUICIDE SEVERITY RATING SCALE - C-SSRS
6. HAVE YOU EVER DONE ANYTHING, STARTED TO DO ANYTHING, OR PREPARED TO DO ANYTHING TO END YOUR LIFE?: NO
1. IN THE PAST MONTH, HAVE YOU WISHED YOU WERE DEAD OR WISHED YOU COULD GO TO SLEEP AND NOT WAKE UP?: NO
2. HAVE YOU ACTUALLY HAD ANY THOUGHTS OF KILLING YOURSELF?: NO

## 2024-06-06 ASSESSMENT — PAIN - FUNCTIONAL ASSESSMENT
PAIN_FUNCTIONAL_ASSESSMENT: 0-10
PAIN_FUNCTIONAL_ASSESSMENT: 0-10

## 2024-06-06 ASSESSMENT — PAIN SCALES - GENERAL
PAINLEVEL_OUTOF10: 0 - NO PAIN
PAINLEVEL_OUTOF10: 3

## 2024-06-06 NOTE — NURSING NOTE
Patient Vasc band completely removed at 1715, biocclusive dressing applied. Site ecchymotic, tender to palpation, but remains soft, no hematoma. Bruising was noted upon return from cath lab distal to site. Radial pulse palpable, brisk cap refill, hand warm to touch. Patient medicated x 1 with Tylenol for tenderness. Patient assisted to get dressed, IV removed and lifevest reapplied. Patient discharge packet reviewed with patient and son, able to teach back medication education and when to resume medication, site care and when to remove dressing as well as cardiac rehab and follow up appointments. Patient walks 6 miles/day, asked how soon he can resume this level of activity, informed to start out slowly initially 1 mile/day and then at follow-up he may discuss with cardiology. Patient given his stent card and handouts about his medications and cardiac rehab program. Patient verbalizes understanding. Discharged to home by w/c accompanied by son.

## 2024-06-06 NOTE — NURSING NOTE
Bedside handoff report received from off-going RN, Eliza. Patient is S/P PCI with KRISTIN to RCA via rt radial approach performed by Dr. Nieves. Patient resting comfortably on cart, denies any complaints of CP/SOB, pain. Rt radial site with Vasc band intact, small soft bruise noted distally to vasc band, ulnar pulse is palpable and brisk cap refill. Patient has eaten his box lunch. Will review orders and labs now.

## 2024-06-06 NOTE — DISCHARGE INSTRUCTIONS

## 2024-06-06 NOTE — POST-PROCEDURE NOTE
Physician Transition of Care Summary  Invasive Cardiovascular Lab    Procedure Date: 6/6/2024  Attending:    Mili Nieves - Primary  Resident/Fellow/Other Assistant: Surgeons and Role:  * No surgeons found with a matching role *    Indications:   Pre-op Diagnosis     * Cardiomyopathy, ischemic [I25.5]     * Chronic systolic heart failure (Multi) [I50.22]     * Abnormal EKG [R94.31]    Post-procedure diagnosis:   Post-op Diagnosis     * Cardiomyopathy, ischemic [I25.5]     * Chronic systolic heart failure (Multi) [I50.22]     * Abnormal EKG [R94.31]    Procedure(s):     * Left Heart Cath, With LV    * PCI JAS Stent- Coronary      Procedure Findings:   Patent previous LAD and circumflex stents, severe disease of the very distal apical branch of the LAD, 80% stenosis of mid very large superdominant right coronary artery, severe left ventricular dysfunction ejection fraction of 25 to 30%, successful PTCA drug-eluting stents of the right coronary artery    Description of the Procedure:   Left heart catheterization coronary angiography left angiogram, PTCA drug-eluting stents of right coronary artery    Complications:   None    Stents/Implants:   Implants       Stent    Stent, Monique Lun[place] Jas, 4.00 X 26rx - [id number] - Implanted        Inventory item: STENT, MONIQUE FRONTIER JAS, 4.00 X 26RX Model/Cat number: [id number]    : [employer] Lot number: [id number]    Device identifier: [id number]        As of 6/6/2024       Status: Implanted                              Anticoagulation/Antiplatelet Plan:   Intravenous heparin, Plavix load    Estimated Blood Loss:   5 mL    Anesthesia: Moderate Sedation Anesthesia Staff: No anesthesia staff entered.    Any Specimen(s) Removed:   No specimens collected during this procedure.    Disposition:   Dual antiplatelet therapy      Electronically signed by: Stevo Nieves MD, 6/6/2024 1:33 PM

## 2024-06-06 NOTE — NURSING NOTE
Vasc band off completely at 1715, biocclusive dressing applied. Rt hand ecchymotic, bruising had been noticed since arrival from cath lab, site is tender but soft, no hematoma. Hand warm to touch, brisk cap refill, radial pulse palpable. Patient medicated x 1 with Tylenol for complaints of tenderness. Assisted patient to get dressed and reapply his life vest. IV removed. Patient discharge instructions given regarding procedure, medication education and when to take next dose, as well as how to take NTG if needed. Patient also given instructions about site care, cardiac rehab and heart healthy diet. Patient walks 6 miles/day, informed him to ease back into his routine only 1 mile initially and discuss with Dr. Mayo on follow-up when he can resume his 6 miles/day. Son at bedside during discharge instructions as well.

## 2024-06-06 NOTE — Clinical Note
Angioplasty of the middle right coronary artery lesion. Inflation 1: Pressure = 18 aubrey; Duration = 13 sec. Inflation 2: Pressure = 18 aubrey; Duration = 13 sec.

## 2024-06-06 NOTE — Clinical Note
Angioplasty of the middle right coronary artery lesion. Inflation 1: Pressure = 12 aubrey; Duration = 16 sec. Inflation 2: Pressure = 12 aubrey; Duration = 14 sec.

## 2024-06-06 NOTE — NURSING NOTE
At 1630, removed final 1 ml of air from Vasc band but small amount blood noted, therefore 2 ml air reinserted and no further bleeding noted.

## 2024-06-06 NOTE — PROGRESS NOTES
Patient is stable status post LHC/PCI under the care of Dr. Nieves.  Discussed results of procedure with patient and his son.  Pictures provided.  Findings of the LHC revealed patent previous stents in the LAD and circumflex arteries severe disease in the distal LAD/apical, 80% stenosis in the large superdominant mid RCA and an LVEF of 25 to 30%.  Patient underwent successful PCI with 1 KRISTIN placed to the mid RCA reducing that lesion down to 0% stenosis.  The patient tolerated the procedure well.  Please see procedural report for complete details.  Patient was initiated on DAPT with aspirin 81 mg daily and Plavix 75 mg daily.  He will be discharged home later today barring no complications.  He is scheduled to follow-up with Dr. Sharma on Monday.  All questions answered.  Both verbalized understanding.

## 2024-06-09 LAB
ATRIAL RATE: 82 BPM
ATRIAL RATE: 84 BPM
P AXIS: 59 DEGREES
P AXIS: 71 DEGREES
P OFFSET: 178 MS
P OFFSET: 184 MS
P ONSET: 120 MS
P ONSET: 126 MS
PR INTERVAL: 178 MS
PR INTERVAL: 186 MS
Q ONSET: 213 MS
Q ONSET: 215 MS
QRS COUNT: 14 BEATS
QRS COUNT: 14 BEATS
QRS DURATION: 90 MS
QRS DURATION: 94 MS
QT INTERVAL: 382 MS
QT INTERVAL: 408 MS
QTC CALCULATION(BAZETT): 446 MS
QTC CALCULATION(BAZETT): 482 MS
QTC FREDERICIA: 424 MS
QTC FREDERICIA: 456 MS
R AXIS: 18 DEGREES
R AXIS: 32 DEGREES
T AXIS: -3 DEGREES
T AXIS: 18 DEGREES
T OFFSET: 406 MS
T OFFSET: 417 MS
VENTRICULAR RATE: 82 BPM
VENTRICULAR RATE: 84 BPM

## 2024-06-10 ENCOUNTER — OFFICE VISIT (OUTPATIENT)
Dept: CARDIOLOGY | Facility: CLINIC | Age: 74
End: 2024-06-10
Payer: MEDICARE

## 2024-06-10 VITALS
HEART RATE: 101 BPM | WEIGHT: 163.2 LBS | DIASTOLIC BLOOD PRESSURE: 60 MMHG | SYSTOLIC BLOOD PRESSURE: 108 MMHG | BODY MASS INDEX: 24.17 KG/M2 | HEIGHT: 69 IN

## 2024-06-10 DIAGNOSIS — E78.5 DYSLIPIDEMIA: ICD-10-CM

## 2024-06-10 DIAGNOSIS — I25.10 ATHEROSCLEROSIS OF NATIVE CORONARY ARTERY OF NATIVE HEART WITHOUT ANGINA PECTORIS: Primary | ICD-10-CM

## 2024-06-10 DIAGNOSIS — I25.5 CARDIOMYOPATHY, ISCHEMIC: ICD-10-CM

## 2024-06-10 DIAGNOSIS — Z78.9 NEVER SMOKED TOBACCO: ICD-10-CM

## 2024-06-10 DIAGNOSIS — Z98.61 HX OF PERCUTANEOUS TRANSLUMINAL CORONARY ANGIOPLASTY: ICD-10-CM

## 2024-06-10 DIAGNOSIS — I25.2 OLD INFERIOR WALL MYOCARDIAL INFARCTION: ICD-10-CM

## 2024-06-10 DIAGNOSIS — I50.22 CHRONIC SYSTOLIC HEART FAILURE (MULTI): ICD-10-CM

## 2024-06-10 DIAGNOSIS — Z51.89 ENCOUNTER FOR MEDICATION ADJUSTMENT: ICD-10-CM

## 2024-06-10 DIAGNOSIS — I10 PRIMARY HYPERTENSION: ICD-10-CM

## 2024-06-10 DIAGNOSIS — I15.9 SECONDARY HYPERTENSION: ICD-10-CM

## 2024-06-10 PROCEDURE — 1157F ADVNC CARE PLAN IN RCRD: CPT | Performed by: INTERNAL MEDICINE

## 2024-06-10 PROCEDURE — 3074F SYST BP LT 130 MM HG: CPT | Performed by: INTERNAL MEDICINE

## 2024-06-10 PROCEDURE — 99214 OFFICE O/P EST MOD 30 MIN: CPT | Performed by: INTERNAL MEDICINE

## 2024-06-10 PROCEDURE — 1123F ACP DISCUSS/DSCN MKR DOCD: CPT | Performed by: INTERNAL MEDICINE

## 2024-06-10 PROCEDURE — 3078F DIAST BP <80 MM HG: CPT | Performed by: INTERNAL MEDICINE

## 2024-06-10 PROCEDURE — 1159F MED LIST DOCD IN RCRD: CPT | Performed by: INTERNAL MEDICINE

## 2024-06-10 RX ORDER — METOPROLOL SUCCINATE 50 MG/1
50 TABLET, EXTENDED RELEASE ORAL DAILY
Qty: 90 TABLET | Refills: 3 | Status: SHIPPED | OUTPATIENT
Start: 2024-06-10 | End: 2025-06-10

## 2024-06-10 RX ORDER — SPIRONOLACTONE 25 MG/1
12.5 TABLET ORAL DAILY
Qty: 45 TABLET | Refills: 3 | Status: SHIPPED | OUTPATIENT
Start: 2024-06-10 | End: 2025-06-10

## 2024-06-10 NOTE — PATIENT INSTRUCTIONS
START ALDACTONE 12.5MG TAKE ONE TAB DAILY    INCREASE TOPROL XL 50MG    LABS IN 2 WEEKS      Please bring any lab results from other providers / physicians to your next appointment.     Please bring all medicines, vitamins, and herbal supplements with you when you come to the office.     Prescriptions will not be filled unless you are compliant with your follow up appointments or have a follow up appointment scheduled as per instruction of your physician. Refills should be requested at the time of your visit.

## 2024-06-10 NOTE — PROGRESS NOTES
Patient:  Mario Pringle  YOB: 1950  MRN: [id number]       HPI:       Mario Pringle is a 73 y.o. male who returns today for cardiac follow-up.  5/10/24:  He has a history of atherosclerotic heart disease with remote STEMI in November 2006. He underwent angioplasty and stenting of the RCA at that time. An echocardiogram in 2006 showed inferolateral wall hypokinesis and an estimated LV ejection fraction of 45%. Aortic valve appeared to be bicuspid. A stress myocardial perfusion study showed mild inferior wall myocardial ischemia versus shifting diaphragmatic attenuation artifact. Calculated LV ejection fraction 44%. He has a history of hypertension, hyperlipidemia, and carotid artery disease. He has been followed on a regular basis by Dr. Berry Childress. He is maintained on aspirin, atorvastatin, and Toprol-XL.     At the time of his initial visit with me on September 12, 2023 he was doing well.  He noted that he takes a brisk 5 to 6 mile walk daily.  He was scheduled for a repeat carotid ultrasound as well as an echocardiogram to reassess LV ejection fraction and reported bicuspid aortic valve.  The echocardiogram showed an estimated LV ejection fraction of 30% with severe hypokinesis of the inferior and inferolateral walls.  The distal anterior wall was hypokinetic.  Right ventricular chamber size and function normal.  Valvular structure and function were normal.  Carotid ultrasound October 5, 2023 showed 50 to 69% stenosis in the right proximal internal carotid artery and greater than 50% stenosis of the right external carotid artery.  There was 50 to 69% stenosis of the left internal carotid artery.  A myocardial perfusion study on December 4, 2023 showed moderate inferolateral myocardial infarction extending from apex to base.  No evidence of myocardial ischemia by perfusion imaging.  There was severe LV dysfunction and a calculated LV ejection fraction of 29%.  Moderate to high risk study  though no significant ischemia identified.    I spoke with him in detail regarding the testing results.  It appears he likely had has an extensive inferior myocardial infarction sometime ago.  No ischemia was identified on the stress test and he has not having any active anginal or CHF symptoms.  He was continued on Toprol-XL.  He was started on Entresto 24/26 mg twice daily and Jardiance 10 mg daily.  He had a LifeVest placed.  He was seen in follow-up by Rosalva Haynes CNP May 31, 2024 and was doing well.  Entresto was increased to 49/51 mg twice daily.  Cardiac catheterization by Dr. Nieves June 6, 2024 showed normal left main trunk, patent LAD stents, 70% stenosis of the distal LAD, patent circumflex stents with mild luminal irregularities, 80% stenosis of the mid RCA.  He underwent angioplasty and stenting of the RCA.  Estimated LV ejection fraction was 25%.  He was seen by Dr. Hutton and was recommended to undergo cardiac MRI in late August.     He has been doing well since his last visit.  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  He remains very active without limitations.  He denies any chest pain or shortness of breath. He denies any orthopnea, PND, or increasing peripheral edema. He denies any palpitations, lightheadedness, near-syncope, or syncope. He denies any fever, chills, or cough. He denies any nausea, vomiting, or diaphoresis. He denies any hemoptysis, hematemesis, melena, or hematochezia. Lab studies dated Teresa 3, 2024 showed a normal basic metabolic profile.  INR 1.1.  Lipid profile on May 13, 2024 showed a cholesterol 123 with HDL 47, LDL 53, and triglycerides 114.  He was advised to increase Toprol-XL to 50 mg daily.  He will also start on spironolactone 12.5 mg daily.  BMP in 1 week.  Titrate GDMT as tolerated.  Eventual ICD implant for primary prevention of sudden cardiac death if LV ejection fraction remains depressed below 35%.  Other details as noted below.     The above  portion of this note was dictated by me using voice recognition software. I personally performed the services described in the documentation. The scribe entering the documentation below was in my presence. I affirm that the information is both accurate and complete.       Objective:     Vitals:    06/10/24 1335   BP: 108/60   Pulse: 101       Wt Readings from Last 4 Encounters:   06/06/24 72 kg (158 lb 11.7 oz)   05/31/24 74.4 kg (164 lb)   05/23/24 73.9 kg (163 lb)   05/10/24 73.8 kg (162 lb 9.6 oz)       Allergies:     No Known Allergies       Medications:     Current Outpatient Medications   Medication Instructions    aspirin 81 mg EC tablet 1 tablet, oral, Daily    atorvastatin (LIPITOR) 80 mg, oral, Daily    clopidogrel (PLAVIX) 75 mg, oral, Daily    empagliflozin (JARDIANCE) 10 mg, oral, Daily    ezetimibe (ZETIA) 10 mg, oral, Daily    fish oil concentrate (Omega-3) 120-180 mg capsule 1 capsule, oral, Daily    metoprolol succinate XL (TOPROL-XL) 50 mg, oral, Daily    multivitamin with minerals tablet 1 tablet, oral, Daily    nitroglycerin (NITROSTAT) 0.4 mg, sublingual, Every 5 min PRN, May repeat every 5 minutes for up to 3 doses.    sacubitriL-valsartan (Entresto) 49-51 mg tablet 1 tablet, oral, 2 times daily    spironolactone (ALDACTONE) 12.5 mg, oral, Daily       Physical Examination:   GENERAL:  Well developed, well nourished, in no acute distress.  CHEST:  Symmetric and nontender.  NEURO/PSYCH:  Alert and oriented times three with approppriate behavior and responses.  NECK:  Supple, no JVD, no bruit.  LUNGS:  Clear to auscultation bilaterally, normal respiratory effort.  HEART:  Rate and rhythm regular with 1-2/6 TAMIKA LSB murmur, no gallop appreciated.        There are no rubs, clicks or heaves.  EXTREMITIES:  Warm with good color, no clubbing or cyanosis.  There is no edema noted.  PERIPHERAL VASCULAR:  Pulses present and equally palpable; 2+ throughout.      Lab:     CBC:   Lab Results   Component Value  Date    WBC 7.4 05/13/2024    RBC 4.23 (L) 05/13/2024    HGB 14.5 05/13/2024    HCT 42.5 05/13/2024     05/13/2024        CMP:    Lab Results   Component Value Date     06/03/2024    K 4.3 06/03/2024     06/03/2024    CO2 29 06/03/2024    BUN 19 06/03/2024    CREATININE 1.08 06/03/2024    GLUCOSE 100 (H) 06/03/2024    CALCIUM 9.4 06/03/2024       Magnesium:    Lab Results   Component Value Date    MG 1.40 (L) 11/11/2022       Lipid Profile:    Lab Results   Component Value Date    TRIG 114 05/13/2024    HDL 47.3 05/13/2024    LDLCALC 53 05/13/2024    LDLDIRECT 85 04/26/2022       TSH:    Lab Results   Component Value Date    TSH 1.37 05/13/2024       PT/INR:    Lab Results   Component Value Date    PROTIME 12.0 06/03/2024    INR 1.1 06/03/2024       BMP:  Lab Results   Component Value Date     06/03/2024     05/13/2024     04/05/2023     11/11/2022     11/04/2022    K 4.3 06/03/2024    K 4.5 05/13/2024    K 4.4 04/05/2023    K 4.2 11/11/2022    K 4.0 11/04/2022     06/03/2024     05/13/2024     04/05/2023     11/11/2022     11/04/2022    CO2 29 06/03/2024    CO2 29 05/13/2024    CO2 28 04/05/2023    CO2 25 11/11/2022    CO2 28 11/04/2022    BUN 19 06/03/2024    BUN 23 05/13/2024    BUN 19 04/05/2023    BUN 17 11/11/2022    BUN 12 11/04/2022    CREATININE 1.08 06/03/2024    CREATININE 1.17 05/13/2024    CREATININE 1.04 04/05/2023    CREATININE 1.04 11/11/2022    CREATININE 0.90 11/04/2022       CBC:  Lab Results   Component Value Date    WBC 7.4 05/13/2024    WBC 6.4 04/05/2023    WBC 9.3 11/11/2022    WBC 7.4 11/04/2022    RBC 4.23 (L) 05/13/2024    RBC 4.36 (L) 04/05/2023    RBC 3.61 (L) 11/11/2022    RBC 4.25 (L) 11/04/2022    HGB 14.5 05/13/2024    HGB 14.6 04/05/2023    HGB 12.6 (L) 11/11/2022    HGB 14.6 11/04/2022    HCT 42.5 05/13/2024    HCT 43.0 04/05/2023    HCT 36.0 (L) 11/11/2022    HCT 42.5 11/04/2022     (H)  05/13/2024    MCV 99 04/05/2023     11/11/2022     11/04/2022    MCH 34.3 (H) 05/13/2024    MCHC 34.1 05/13/2024    MCHC 34.0 04/05/2023    MCHC 35.0 11/11/2022    MCHC 34.4 11/04/2022    RDW 13.0 05/13/2024    RDW 12.7 04/05/2023    RDW 12.5 11/11/2022    RDW 12.7 11/04/2022     05/13/2024     04/05/2023     (L) 11/11/2022     11/04/2022       Hepatic Function Panel:    Lab Results   Component Value Date    ALKPHOS 50 05/13/2024    ALT 27 05/13/2024    AST 30 05/13/2024    PROT 6.9 05/13/2024    BILITOT 0.8 05/13/2024       Diagnostic Studies:     Electrocardiogram 12 Lead  Result Date: 6/9/2024    Normal sinus rhythm Abnormal ECG When compared with ECG of 06-JUN-2024 09:59, (unconfirmed) No significant change was found Confirmed by Ken Espinoza ([id number]) on 6/9/2024 8:11:01 PM      Cardiac Catheterization Procedure  Result Date: 6/6/202    CONCLUSIONS:  1. Left Main Coronary Artery: This artery is normal.  2. Left Anterior Descending Artery: presents luminal irregularities and contains patent previously placed stents.  3. Distal LAD Lesion: The percent stenosis is 70%.  4. Circumflex Coronary Artery: presents luminal irregularities and contains patent previously placed stents.  5. Right Coronary Artery: presents luminal irregularities, significantly obstructed and contains patent previously placed stents.  6. Mid RCA Lesion: The percent stenosis is 80%.  7. Mid RCA Lesion: pre-dilation, [place] 4.00x26 post-dilation: 0% residual stenosis. RCA: pre-procedure TERRA flow was 3(complete perfusion) and post-procedure TERRA flow was 3(complete perfusion).  8. The Left Ventricular Ejection Fraction is 25%. ICD 10 Codes: Cardiomyopathy, unspecified-I42.9  CPT Codes: Left Heart Cath (visualization of coronaries) and LV-26901; Stent w angioplasty Right Coronary single major Artery branch (PCI)-61111.RC; Moderate Sedation Services 1st additional 15 minutes patient >5 years-77820;  Moderate Sedation Services 2nd additional 15 minutes patient >5 years-99133  [id number] Stevo Nieves MD Performing Physician Electronically signed by [id number] Stevo Nieves MD on 6/6/2024 at 1:45:21 PM  ** Final **       Problem List:     Patient Active Problem List   Diagnosis    Atherosclerosis of native coronary artery of native heart without angina pectoris    Dyslipidemia    Hypertension    Prostate cancer (Multi)    Routine general medical examination at health care facility    Cardiomyopathy, ischemic    H/O percutaneous transluminal coronary angioplasty    Old inferior wall myocardial infarction    Chronic systolic heart failure (Multi)    Abnormal EKG    BMI 26.0-26.9,adult    Never smoked tobacco    Encounter for medication adjustment    Cancer (Multi)       Asessment:     Problem List Items Addressed This Visit             ICD-10-CM    Atherosclerosis of native coronary artery of native heart without angina pectoris - Primary I25.10    Relevant Medications    metoprolol succinate XL (Toprol-XL) 50 mg 24 hr tablet    Other Relevant Orders    Follow Up In Cardiology    Follow Up In Cardiology    Basic metabolic panel    Dyslipidemia E78.5    Relevant Medications    metoprolol succinate XL (Toprol-XL) 50 mg 24 hr tablet    Other Relevant Orders    Follow Up In Cardiology    Follow Up In Cardiology    Basic metabolic panel    Hypertension I10    Relevant Medications    metoprolol succinate XL (Toprol-XL) 50 mg 24 hr tablet    spironolactone (Aldactone) 25 mg tablet    Other Relevant Orders    Follow Up In Cardiology    Follow Up In Cardiology    Basic metabolic panel    Follow Up In Cardiology    Follow Up In Cardiology    Basic metabolic panel    Cardiomyopathy, ischemic I25.5    Relevant Medications    metoprolol succinate XL (Toprol-XL) 50 mg 24 hr tablet    empagliflozin (Jardiance) 10 mg    Other Relevant Orders    Follow Up In Cardiology    Follow Up In Cardiology    Basic metabolic panel    Hx of percutaneous  transluminal coronary angioplasty Z98.61    Relevant Medications    metoprolol succinate XL (Toprol-XL) 50 mg 24 hr tablet    Other Relevant Orders    Follow Up In Cardiology    Follow Up In Cardiology    Basic metabolic panel    Old inferior wall myocardial infarction I25.2    Relevant Medications    metoprolol succinate XL (Toprol-XL) 50 mg 24 hr tablet    Other Relevant Orders    Follow Up In Cardiology    Follow Up In Cardiology    Basic metabolic panel    Chronic systolic heart failure (Multi) I50.22    Relevant Medications    metoprolol succinate XL (Toprol-XL) 50 mg 24 hr tablet    empagliflozin (Jardiance) 10 mg    Other Relevant Orders    Follow Up In Cardiology    Follow Up In Cardiology    Basic metabolic panel    BMI 26.0-26.9,adult Z68.26    Relevant Medications    metoprolol succinate XL (Toprol-XL) 50 mg 24 hr tablet    Other Relevant Orders    Follow Up In Cardiology    Follow Up In Cardiology    Basic metabolic panel    Never smoked tobacco Z78.9    Relevant Medications    metoprolol succinate XL (Toprol-XL) 50 mg 24 hr tablet    Other Relevant Orders    Follow Up In Cardiology    Follow Up In Cardiology    Basic metabolic panel    Encounter for medication adjustment Z51.89    Relevant Medications    metoprolol succinate XL (Toprol-XL) 50 mg 24 hr tablet    Other Relevant Orders    Follow Up In Cardiology    Follow Up In Cardiology    Basic metabolic panel       Plan:     -He will be started on Aldactone 12.5 mg daily.  Toprol-XL will be increased to 50 mg daily.  He will otherwise continue on his same medications.    -He was advised to restrict sodium with an aim of no more than 2 grams per day.     -He was advised on the need for regular exercise.    -He was advised on the need to follow a Mediterranean style diet.    -Follow-up as scheduled

## 2024-07-02 ENCOUNTER — APPOINTMENT (OUTPATIENT)
Dept: CARDIOLOGY | Facility: CLINIC | Age: 74
End: 2024-07-02
Payer: MEDICARE

## 2024-07-09 ENCOUNTER — APPOINTMENT (OUTPATIENT)
Dept: CARDIOLOGY | Facility: CLINIC | Age: 74
End: 2024-07-09
Payer: MEDICARE

## 2024-07-09 VITALS
DIASTOLIC BLOOD PRESSURE: 50 MMHG | SYSTOLIC BLOOD PRESSURE: 82 MMHG | BODY MASS INDEX: 23.31 KG/M2 | HEART RATE: 98 BPM | HEIGHT: 69 IN | WEIGHT: 157.4 LBS

## 2024-07-09 DIAGNOSIS — I10 PRIMARY HYPERTENSION: ICD-10-CM

## 2024-07-09 DIAGNOSIS — I25.2 OLD INFERIOR WALL MYOCARDIAL INFARCTION: ICD-10-CM

## 2024-07-09 DIAGNOSIS — Z78.9 NEVER SMOKED TOBACCO: ICD-10-CM

## 2024-07-09 DIAGNOSIS — Z51.89 ENCOUNTER FOR MEDICATION ADJUSTMENT: ICD-10-CM

## 2024-07-09 DIAGNOSIS — I25.5 CARDIOMYOPATHY, ISCHEMIC: ICD-10-CM

## 2024-07-09 DIAGNOSIS — I15.9 SECONDARY HYPERTENSION: ICD-10-CM

## 2024-07-09 DIAGNOSIS — I25.10 ATHEROSCLEROSIS OF NATIVE CORONARY ARTERY OF NATIVE HEART WITHOUT ANGINA PECTORIS: ICD-10-CM

## 2024-07-09 DIAGNOSIS — Z98.61 HX OF PERCUTANEOUS TRANSLUMINAL CORONARY ANGIOPLASTY: ICD-10-CM

## 2024-07-09 DIAGNOSIS — E78.5 DYSLIPIDEMIA: ICD-10-CM

## 2024-07-09 DIAGNOSIS — I50.22 CHRONIC SYSTOLIC HEART FAILURE (MULTI): ICD-10-CM

## 2024-07-09 PROCEDURE — 1157F ADVNC CARE PLAN IN RCRD: CPT | Performed by: NURSE PRACTITIONER

## 2024-07-09 PROCEDURE — 3074F SYST BP LT 130 MM HG: CPT | Performed by: NURSE PRACTITIONER

## 2024-07-09 PROCEDURE — 3078F DIAST BP <80 MM HG: CPT | Performed by: NURSE PRACTITIONER

## 2024-07-09 PROCEDURE — 99214 OFFICE O/P EST MOD 30 MIN: CPT | Performed by: NURSE PRACTITIONER

## 2024-07-09 PROCEDURE — 1159F MED LIST DOCD IN RCRD: CPT | Performed by: NURSE PRACTITIONER

## 2024-07-09 PROCEDURE — 1036F TOBACCO NON-USER: CPT | Performed by: NURSE PRACTITIONER

## 2024-07-09 PROCEDURE — 1123F ACP DISCUSS/DSCN MKR DOCD: CPT | Performed by: NURSE PRACTITIONER

## 2024-07-09 RX ORDER — METOPROLOL SUCCINATE 50 MG/1
25 TABLET, EXTENDED RELEASE ORAL DAILY
Start: 2024-07-09 | End: 2025-07-09

## 2024-07-09 NOTE — PATIENT INSTRUCTIONS
Decrease your Metoprolol succinate 50 mg to 1/2 tablet daily    Decrease your Entresto 49/51 to 1/2 tablet twice day    Have labs drawn in 2 weeks.

## 2024-07-09 NOTE — PROGRESS NOTES
Mario Pringle is a 73 y.o. male that presents to the office today for cardiac follow-up. He follows with his  primary cardiologist Dr. Sharma and was added to my schedule today.      Per Dr. Sharma's office notes  He has a history of atherosclerotic heart disease with remote STEMI in November 2006. He underwent angioplasty and stenting of the RCA at that time. An echocardiogram in 2006 showed inferolateral wall hypokinesis and an estimated LV ejection fraction of 45%. Aortic valve appeared to be bicuspid. A stress myocardial perfusion study showed mild inferior wall myocardial ischemia versus shifting diaphragmatic attenuation artifact. Calculated LV ejection fraction 44%. He has a history of hypertension, hyperlipidemia, and carotid artery disease. He has been followed on a regular basis by Dr. Berry Childress. He is maintained on aspirin, atorvastatin, and Toprol-XL.     At the time of his initial visit with me on September 12, 2023 he was doing well.  He noted that he takes a brisk 5 to 6 mile walk daily.  He was scheduled for a repeat carotid ultrasound as well as an echocardiogram to reassess LV ejection fraction and reported bicuspid aortic valve.  The echocardiogram showed an estimated LV ejection fraction of 30% with severe hypokinesis of the inferior and inferolateral walls.  The distal anterior wall was hypokinetic.  Right ventricular chamber size and function normal.  Valvular structure and function were normal.  Carotid ultrasound October 5, 2023 showed 50 to 69% stenosis in the right proximal internal carotid artery and greater than 50% stenosis of the right external carotid artery.  There was 50 to 69% stenosis of the left internal carotid artery.  A myocardial perfusion study on December 4, 2023 showed moderate inferolateral myocardial infarction extending from apex to base.  No evidence of myocardial ischemia by perfusion imaging.  There was severe LV dysfunction and a calculated LV ejection  fraction of 29%.  Moderate to high risk study though no significant ischemia identified.     I spoke with him in detail regarding the testing results.  It appears he likely had has an extensive inferior myocardial infarction sometime ago.  No ischemia was identified on the stress test and he has not having any active anginal or CHF symptoms.  He was continued on Toprol-XL.  He was started on Entresto 24/26 mg twice daily and Jardiance 10 mg daily.  He had a LifeVest placed.  He was seen in follow-up by Rosalva Haynes CNP May 31, 2024 and was doing well.  Entresto was increased to 49/51 mg twice daily.  Cardiac catheterization by Dr. Nieves June 6, 2024 showed normal left main trunk, patent LAD stents, 70% stenosis of the distal LAD, patent circumflex stents with mild luminal irregularities, 80% stenosis of the mid RCA.  He underwent angioplasty and stenting of the RCA.  Estimated LV ejection fraction was 25%.  He was seen by Dr. Hutton and was recommended to undergo cardiac MRI in late August.     At his last office visit 6/10/2024 he has been doing well since his last visit.  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   Lab studies dated Teresa 3, 2024 showed a normal basic metabolic profile.  INR 1.1.  Lipid profile on May 13, 2024 showed a cholesterol 123 with HDL 47, LDL 53, and triglycerides 114.  He was advised to increase Toprol-XL to 50 mg daily.  He will also start on spironolactone 12.5 mg daily.  BMP in 1 week.  Titrate GDMT as tolerated.  Eventual ICD implant for primary prevention of sudden cardiac death if LV ejection fraction remains depressed below 35%.      He states overall he is doing well but does admit he has been having low blood pressures after taking his morning medications which often resulted in lightheadedness and dizziness.  Blood pressure is noted to be hypotensive in office today 82/50.  I have reviewed his medications assessment  and vitals with Dr. Sharma who recommends decreasing metoprolol succinate to 25 mg daily along with decreasing Entresto 49/51 mg 1/2 tablet twice a day.  He denies any chest pain, chest pressure, chest tightness, shortness of breath, palpitations.  Denies any bleeding.  He has a pending cardiac MRI in the near future.  He continues with his LifeVest at this time.      Assessment/Plan  Hypotension; decrease Entresto and beta-blocker as noted above  Ischemic cardiomyopathy; continue with LifeVest.  Pending cardiac MRI  Coronary artery disease with recent PCI stenting to the RCA.  Continue DAPT  Obtain basic metabolic panel in 2 weeks  Follow-up in the office with Dr. Sharma in 1 month or sooner if needed.      Patient Active Problem List   Diagnosis    Atherosclerosis of native coronary artery of native heart without angina pectoris    Dyslipidemia    Hypertension    Prostate cancer (Multi)    Routine general medical examination at health care facility    Cardiomyopathy, ischemic    Hx of percutaneous transluminal coronary angioplasty    Old inferior wall myocardial infarction    Chronic systolic heart failure (Multi)    Abnormal EKG    BMI 26.0-26.9,adult    Never smoked tobacco    Encounter for medication adjustment    Cancer (Multi)       Social History     Tobacco Use    Smoking status: Never    Smokeless tobacco: Never   Vaping Use    Vaping status: Never Used   Substance Use Topics    Alcohol use: Not Currently     Alcohol/week: 1.0 standard drink of alcohol     Types: 1 Glasses of wine per week     Comment: on occ    Drug use: Never       Past Medical History:   Diagnosis Date    Cancer (Multi)     Prostate    CHF (congestive heart failure) (Multi)     Coronary artery disease     Hyperlipidemia     Hypertension     Ischemic cardiomyopathy     Right foot drop 02/17/2023         Current Outpatient Medications:     aspirin 81 mg EC tablet, Take 1 tablet (81 mg) by mouth once daily., Disp: , Rfl:     atorvastatin  (Lipitor) 80 mg tablet, Take 1 tablet (80 mg) by mouth once daily., Disp: 90 tablet, Rfl: 2    clopidogrel (Plavix) 75 mg tablet, Take 1 tablet (75 mg) by mouth once daily for 365 doses. Do not fill before June 7, 2024., Disp: 30 tablet, Rfl: 11    empagliflozin (Jardiance) 10 mg, Take 1 tablet (10 mg) by mouth once daily., Disp: 90 tablet, Rfl: 0    ezetimibe (Zetia) 10 mg tablet, Take 1 tablet (10 mg) by mouth once daily., Disp: , Rfl:     fish oil concentrate (Omega-3) 120-180 mg capsule, Take 1 capsule (1 g) by mouth once daily., Disp: , Rfl:     multivitamin with minerals tablet, Take 1 tablet by mouth once daily., Disp: , Rfl:     nitroglycerin (Nitrostat) 0.4 mg SL tablet, Place 1 tablet (0.4 mg) under the tongue every 5 minutes if needed for chest pain. May repeat every 5 minutes for up to 3 doses., Disp: 25 tablet, Rfl: 3    spironolactone (Aldactone) 25 mg tablet, Take 0.5 tablets (12.5 mg) by mouth once daily., Disp: 45 tablet, Rfl: 3    metoprolol succinate XL (Toprol-XL) 50 mg 24 hr tablet, Take 0.5 tablets (25 mg) by mouth once daily., Disp: , Rfl:     sacubitriL-valsartan (Entresto) 49-51 mg tablet, Take 0.5 tablets by mouth 2 times a day., Disp: , Rfl:     Patient has no known allergies.    Family History   Problem Relation Name Age of Onset    Hypertension Other Family history        Past Surgical History:   Procedure Laterality Date    CARDIAC CATHETERIZATION N/A 6/6/2024    Procedure: Left Heart Cath, With LV;  Surgeon: Stevo Nieves MD;  Location: [facility];  Service: Cardiovascular;  Laterality: N/A;    CARDIAC CATHETERIZATION N/A 6/6/2024    Procedure: PCI KRISTIN Stent- Coronary;  Surgeon: Stevo Nieves MD;  Location: [facility];  Service: Cardiovascular;  Laterality: N/A;    CORONARY ANGIOPLASTY      US ASPIRATION INJECTION INTERMEDIATE JOINT  03/16/2022    US ASPIRATION INJECTION INTERMEDIATE JOINT 3/16/2022 [facility]          Review of systems  Constitutional:  "No weight loss, fever, chills, weakness or fatigue  HEENT: No visual loss, blurred vision, double vision or yellow sclerae  Skin: No rash or itching  Cardiovascular: No chest pain, pressure or discomfort, No palpitations or edema.  Respiratory: No shortness of breath, cough or sputum  Gastrointestinal: No nausea, vomiting or diarrhea. No bloody or dark tarry stools.  Neurological: No headache,  syncope.  Positive for lightheadedness, dizziness,  Musculoskeletal: No muscle, back pain, joint pain or stiffness.  Hematologic: No anemia, bleeding or bruising.    BP 82/50 (BP Location: Left arm, Patient Position: Sitting)   Pulse 98   Ht 1.753 m (5' 9\")   Wt 71.4 kg (157 lb 6.4 oz)   BMI 23.24 kg/m²     Patient Active Problem List   Diagnosis    Atherosclerosis of native coronary artery of native heart without angina pectoris    Dyslipidemia    Hypertension    Prostate cancer (Multi)    Routine general medical examination at health care facility    Cardiomyopathy, ischemic    Hx of percutaneous transluminal coronary angioplasty    Old inferior wall myocardial infarction    Chronic systolic heart failure (Multi)    Abnormal EKG    BMI 26.0-26.9,adult    Never smoked tobacco    Encounter for medication adjustment    Cancer (Multi)         Physical Exam  Constitutional: Well developed, awake/alert x 3, no distress.  Head/Neck: No JVD, No bruits  Respiratory/Thorax: patent airways, CTAB, normal breath sounds with good expansion.  Cardiovascular: Regular rate and rhythm, no murmurs, normal S1 and S2,   Gastrointestinal: Non distended, soft, non-tender, no rebound tenderness or guarding.  Extremities: No cyanosis, edema.    Neurological: Alert and oriented x 3. Moves extremities spontaneous with purpose.  Psychological: Appropriate mood and behavior  Skin: Warm and Dry. No lesions or rashes.         Please excuse any errors in grammar or translation related to dictation, voice recognition software was used to prepare this " document.

## 2024-07-11 ENCOUNTER — LAB (OUTPATIENT)
Dept: LAB | Facility: LAB | Age: 74
End: 2024-07-11
Payer: MEDICARE

## 2024-07-11 DIAGNOSIS — I15.9 SECONDARY HYPERTENSION: ICD-10-CM

## 2024-07-11 DIAGNOSIS — I50.22 CHRONIC SYSTOLIC HEART FAILURE (MULTI): ICD-10-CM

## 2024-07-11 DIAGNOSIS — I25.5 CARDIOMYOPATHY, ISCHEMIC: ICD-10-CM

## 2024-07-11 DIAGNOSIS — I25.2 OLD INFERIOR WALL MYOCARDIAL INFARCTION: ICD-10-CM

## 2024-07-11 DIAGNOSIS — Z51.89 ENCOUNTER FOR MEDICATION ADJUSTMENT: ICD-10-CM

## 2024-07-11 DIAGNOSIS — I25.10 ATHEROSCLEROSIS OF NATIVE CORONARY ARTERY OF NATIVE HEART WITHOUT ANGINA PECTORIS: ICD-10-CM

## 2024-07-11 DIAGNOSIS — Z98.61 HX OF PERCUTANEOUS TRANSLUMINAL CORONARY ANGIOPLASTY: ICD-10-CM

## 2024-07-11 DIAGNOSIS — Z78.9 NEVER SMOKED TOBACCO: ICD-10-CM

## 2024-07-11 DIAGNOSIS — I10 PRIMARY HYPERTENSION: ICD-10-CM

## 2024-07-11 DIAGNOSIS — E78.5 DYSLIPIDEMIA: ICD-10-CM

## 2024-07-11 LAB
ANION GAP SERPL CALC-SCNC: 11 MMOL/L (ref 10–20)
BUN SERPL-MCNC: 21 MG/DL (ref 6–23)
CALCIUM SERPL-MCNC: 9.4 MG/DL (ref 8.6–10.3)
CHLORIDE SERPL-SCNC: 105 MMOL/L (ref 98–107)
CO2 SERPL-SCNC: 27 MMOL/L (ref 21–32)
CREAT SERPL-MCNC: 1.16 MG/DL (ref 0.5–1.3)
EGFRCR SERPLBLD CKD-EPI 2021: 66 ML/MIN/1.73M*2
GLUCOSE SERPL-MCNC: 101 MG/DL (ref 74–99)
POTASSIUM SERPL-SCNC: 4.2 MMOL/L (ref 3.5–5.3)
SODIUM SERPL-SCNC: 139 MMOL/L (ref 136–145)

## 2024-07-11 PROCEDURE — 36415 COLL VENOUS BLD VENIPUNCTURE: CPT

## 2024-07-11 PROCEDURE — 80048 BASIC METABOLIC PNL TOTAL CA: CPT

## 2024-07-20 DIAGNOSIS — I15.9 SECONDARY HYPERTENSION: ICD-10-CM

## 2024-07-20 RX ORDER — ATORVASTATIN CALCIUM 80 MG/1
80 TABLET, FILM COATED ORAL DAILY
Qty: 90 TABLET | Refills: 2 | Status: SHIPPED | OUTPATIENT
Start: 2024-07-20

## 2024-07-23 ENCOUNTER — HOSPITAL ENCOUNTER (OUTPATIENT)
Dept: RADIOLOGY | Facility: CLINIC | Age: 74
Discharge: HOME | End: 2024-07-23
Payer: MEDICARE

## 2024-07-23 DIAGNOSIS — I25.5 CARDIOMYOPATHY, ISCHEMIC: ICD-10-CM

## 2024-07-23 DIAGNOSIS — I50.22 CHRONIC SYSTOLIC HEART FAILURE (MULTI): ICD-10-CM

## 2024-07-23 PROCEDURE — 75565 CARD MRI VELOC FLOW MAPPING: CPT | Performed by: INTERNAL MEDICINE

## 2024-07-23 PROCEDURE — 75561 CARDIAC MRI FOR MORPH W/DYE: CPT | Performed by: INTERNAL MEDICINE

## 2024-07-23 PROCEDURE — 75561 CARDIAC MRI FOR MORPH W/DYE: CPT

## 2024-07-23 PROCEDURE — 2550000001 HC RX 255 CONTRASTS: Performed by: INTERNAL MEDICINE

## 2024-07-23 PROCEDURE — 75565 CARD MRI VELOC FLOW MAPPING: CPT

## 2024-07-23 PROCEDURE — A9575 INJ GADOTERATE MEGLUMI 0.1ML: HCPCS | Performed by: INTERNAL MEDICINE

## 2024-07-23 RX ORDER — GADOTERATE MEGLUMINE 376.9 MG/ML
29 INJECTION INTRAVENOUS
Status: COMPLETED | OUTPATIENT
Start: 2024-07-23 | End: 2024-07-23

## 2024-07-31 ENCOUNTER — APPOINTMENT (OUTPATIENT)
Dept: CARDIOLOGY | Facility: CLINIC | Age: 74
End: 2024-07-31
Payer: MEDICARE

## 2024-08-08 ENCOUNTER — APPOINTMENT (OUTPATIENT)
Dept: CARDIOLOGY | Facility: CLINIC | Age: 74
End: 2024-08-08
Payer: MEDICARE

## 2024-08-08 VITALS
HEART RATE: 93 BPM | SYSTOLIC BLOOD PRESSURE: 92 MMHG | BODY MASS INDEX: 22.99 KG/M2 | WEIGHT: 155.2 LBS | DIASTOLIC BLOOD PRESSURE: 62 MMHG | HEIGHT: 69 IN

## 2024-08-08 DIAGNOSIS — E78.5 DYSLIPIDEMIA: ICD-10-CM

## 2024-08-08 DIAGNOSIS — I15.9 SECONDARY HYPERTENSION: ICD-10-CM

## 2024-08-08 DIAGNOSIS — Z78.9 NEVER SMOKED TOBACCO: ICD-10-CM

## 2024-08-08 DIAGNOSIS — Z51.89 ENCOUNTER FOR MEDICATION ADJUSTMENT: ICD-10-CM

## 2024-08-08 DIAGNOSIS — I25.2 OLD INFERIOR WALL MYOCARDIAL INFARCTION: ICD-10-CM

## 2024-08-08 DIAGNOSIS — I25.5 CARDIOMYOPATHY, ISCHEMIC: ICD-10-CM

## 2024-08-08 DIAGNOSIS — I50.22 CHRONIC SYSTOLIC HEART FAILURE (MULTI): ICD-10-CM

## 2024-08-08 DIAGNOSIS — I10 PRIMARY HYPERTENSION: ICD-10-CM

## 2024-08-08 DIAGNOSIS — I25.10 ATHEROSCLEROSIS OF NATIVE CORONARY ARTERY OF NATIVE HEART WITHOUT ANGINA PECTORIS: ICD-10-CM

## 2024-08-08 DIAGNOSIS — Z98.61 HX OF PERCUTANEOUS TRANSLUMINAL CORONARY ANGIOPLASTY: ICD-10-CM

## 2024-08-08 PROCEDURE — 1159F MED LIST DOCD IN RCRD: CPT | Performed by: INTERNAL MEDICINE

## 2024-08-08 PROCEDURE — 1157F ADVNC CARE PLAN IN RCRD: CPT | Performed by: INTERNAL MEDICINE

## 2024-08-08 PROCEDURE — 3078F DIAST BP <80 MM HG: CPT | Performed by: INTERNAL MEDICINE

## 2024-08-08 PROCEDURE — 99214 OFFICE O/P EST MOD 30 MIN: CPT | Performed by: INTERNAL MEDICINE

## 2024-08-08 PROCEDURE — 1123F ACP DISCUSS/DSCN MKR DOCD: CPT | Performed by: INTERNAL MEDICINE

## 2024-08-08 PROCEDURE — 3074F SYST BP LT 130 MM HG: CPT | Performed by: INTERNAL MEDICINE

## 2024-08-08 PROCEDURE — 1036F TOBACCO NON-USER: CPT | Performed by: INTERNAL MEDICINE

## 2024-08-08 RX ORDER — METOPROLOL SUCCINATE 25 MG/1
25 TABLET, EXTENDED RELEASE ORAL DAILY
Qty: 90 TABLET | Refills: 1 | Status: SHIPPED | OUTPATIENT
Start: 2024-08-08 | End: 2025-08-08

## 2024-08-08 NOTE — PATIENT INSTRUCTIONS
DR MARQUIS CHANGED YOUR METOPROLOL SUCCINATE 50 MG TABLET 1/2 DAILY TO A 25 MG TABLET 1 FULL TABLET DAILY    DR MARQUIS CHANGED YOUR ENTRESTO 49/51 MG TABLET 1/2 TWICE A DAY TO 24/26 MG TABLET 1 FULL TABLET TWICE A DAY    FASTING LABS TO BE DONE PRIOR TO YOUR APPOINTMENT IN 3-4 MONTHS WITH DR MARQUIS (2-3 DAYS BEFORE)

## 2024-08-08 NOTE — PROGRESS NOTES
Patient:  Mario Pringle  YOB: 1950  MRN: [id number]       HPI:       Mario Pringle is a 74 y.o. male who returns today for cardiac follow-up.  He has a history of atherosclerotic heart disease with remote STEMI in November 2006. He underwent angioplasty and stenting of the RCA at that time. An echocardiogram in 2006 showed inferolateral wall hypokinesis and an estimated LV ejection fraction of 45%. Aortic valve appeared to be bicuspid. A stress myocardial perfusion study showed mild inferior wall myocardial ischemia versus shifting diaphragmatic attenuation artifact. Calculated LV ejection fraction 44%. He has a history of hypertension, hyperlipidemia, and carotid artery disease. He has been followed on a regular basis by Dr. Berry Childress. He is maintained on aspirin, atorvastatin, and Toprol-XL.     At the time of his initial visit with me on September 12, 2023 he was doing well.  He noted that he takes a brisk 5 to 6 mile walk daily.  He was scheduled for a repeat carotid ultrasound as well as an echocardiogram to reassess LV ejection fraction and reported bicuspid aortic valve.  The echocardiogram showed an estimated LV ejection fraction of 30% with severe hypokinesis of the inferior and inferolateral walls.  The distal anterior wall was hypokinetic.  Right ventricular chamber size and function normal.  Valvular structure and function were normal.  Carotid ultrasound October 5, 2023 showed 50 to 69% stenosis in the right proximal internal carotid artery and greater than 50% stenosis of the right external carotid artery.  There was 50 to 69% stenosis of the left internal carotid artery.  A myocardial perfusion study on December 4, 2023 showed moderate inferolateral myocardial infarction extending from apex to base.  No evidence of myocardial ischemia by perfusion imaging.  There was severe LV dysfunction and a calculated LV ejection fraction of 29%.  Moderate to high risk study though no  significant ischemia identified.     It appeared that he likely had an extensive inferior myocardial infarction sometime ago.  No ischemia was identified on the stress test and he was not having any active anginal or CHF symptoms.  He was continued on Toprol-XL.  He was started on Entresto 24/26 mg twice daily and Jardiance 10 mg daily.  He had a LifeVest placed.  Entresto was subsequently increased to 49/51 mg twice daily.  Cardiac catheterization by Dr. Nieves June 6, 2024 showed normal left main trunk, patent LAD stents, 70% stenosis of the distal LAD, patent circumflex stents with mild luminal irregularities, 80% stenosis of the mid RCA.  He underwent angioplasty and stenting of the RCA.  Estimated LV ejection fraction was 25%.      He was seen by Rosalva Haynes CNP on July 9, 2024.   He was doing well but noted some low blood pressures after taking his morning medications.  He noted some associated lightheadedness.  His blood pressure in the office was 82/50.  Toprol-XL was decreased to 25 mg daily and Entresto was decreased to 24/26 mg twice daily.   A cardiac MRI July 23, 2024 showed a calculated LV ejection fraction of 34%.  There was a nontransmural subendocardial myocardial infarction involving the basal inferior wall and basal inferior septal segment.  RV ejection fraction 63%.        He has been doing well since his last visit.  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  He remains very active without limitations.  He currently is wearing a LifeVest mostly when he is home at night or when sleeping.  He denies any chest pain or shortness of breath. He denies any orthopnea, PND, or increasing peripheral edema. He denies any palpitations, lightheadedness, near-syncope, or syncope. He denies any fever, chills, or cough. He denies any nausea, vomiting, or diaphoresis. He denies any hemoptysis, hematemesis, melena, or hematochezia. Lab studies dated Teresa 3, 2024 showed a normal basic  metabolic profile.  INR 1.1.  Basic metabolic profile on July 11, 2024 was normal.  Lipid profile on May 13, 2024 showed a cholesterol 123 with HDL 47, LDL 53, and triglycerides 114.  BMP in 1 week.   Eventual ICD implant for primary prevention of sudden cardiac death if LV ejection fraction remains depressed below 35%.      We are currently limited in uptitrating his medications secondary to low blood pressure readings.  He will continue on Toprol-XL 25 mg daily, Entresto 24/26 mg twice daily, Aldactone 12.5 mg daily, and Jardiance 10 mg daily.  He will be scheduled for a nuclear MUGA scan in early September.  He is RCA stent was placed June 6, 2024.  Plan for ICD implant for primary prevention of sudden cardiac death if LV ejection fraction is 35% or less.  Other details as noted below.     The above portion of this note was dictated by me using voice recognition software. I personally performed the services described in the documentation. The scribe entering the documentation below was in my presence. I affirm that the information is both accurate and complete.       Objective:     Vitals:    08/08/24 0857   BP: 92/62   Pulse: 93       Wt Readings from Last 4 Encounters:   07/09/24 71.4 kg (157 lb 6.4 oz)   06/10/24 74 kg (163 lb 3.2 oz)   06/06/24 72 kg (158 lb 11.7 oz)   05/31/24 74.4 kg (164 lb)       Allergies:     No Known Allergies       Medications:     Current Outpatient Medications   Medication Instructions    aspirin 81 mg EC tablet 1 tablet, oral, Daily    atorvastatin (LIPITOR) 80 mg, oral, Daily    clopidogrel (PLAVIX) 75 mg, oral, Daily    empagliflozin (JARDIANCE) 10 mg, oral, Daily    ezetimibe (ZETIA) 10 mg, oral, Daily    fish oil concentrate (Omega-3) 120-180 mg capsule 1 capsule, oral, Daily    metoprolol succinate XL (TOPROL-XL) 25 mg, oral, Daily    multivitamin with minerals tablet 1 tablet, oral, Daily    nitroglycerin (NITROSTAT) 0.4 mg, sublingual, Every 5 min PRN, May repeat every 5  minutes for up to 3 doses.    sacubitriL-valsartan (Entresto) 49-51 mg tablet 0.5 tablets, oral, 2 times daily    spironolactone (ALDACTONE) 12.5 mg, oral, Daily       Physical Examination:   GENERAL:  Well developed, well nourished, in no acute distress.  CHEST:  Symmetric and nontender.  NEURO/PSYCH:  Alert and oriented times three with approppriate behavior and responses.  NECK:  Supple, no JVD, no bruit.  LUNGS:  Clear to auscultation bilaterally, normal respiratory effort.  HEART:  Rate and rhythm regular with no evident murmur, no gallop appreciated.        There are no rubs, clicks or heaves.  EXTREMITIES:  Warm with good color, no clubbing or cyanosis.  There is no edema noted.  PERIPHERAL VASCULAR:  Pulses present and equally palpable; 2+ throughout.      Lab:     CBC:   Lab Results   Component Value Date    WBC 7.4 05/13/2024    RBC 4.23 (L) 05/13/2024    HGB 14.5 05/13/2024    HCT 42.5 05/13/2024     05/13/2024        CMP:    Lab Results   Component Value Date     07/11/2024    K 4.2 07/11/2024     07/11/2024    CO2 27 07/11/2024    BUN 21 07/11/2024    CREATININE 1.16 07/11/2024    GLUCOSE 101 (H) 07/11/2024    CALCIUM 9.4 07/11/2024       Lipid Profile:    Lab Results   Component Value Date    TRIG 114 05/13/2024    HDL 47.3 05/13/2024    LDLCALC 53 05/13/2024    LDLDIRECT 85 04/26/2022       BMP:  Lab Results   Component Value Date     07/11/2024     06/03/2024     05/13/2024    K 4.2 07/11/2024    K 4.3 06/03/2024    K 4.5 05/13/2024     07/11/2024     06/03/2024     05/13/2024    CO2 27 07/11/2024    CO2 29 06/03/2024    CO2 29 05/13/2024    BUN 21 07/11/2024    BUN 19 06/03/2024    BUN 23 05/13/2024    CREATININE 1.16 07/11/2024    CREATININE 1.08 06/03/2024    CREATININE 1.17 05/13/2024       CBC:  Lab Results   Component Value Date    WBC 7.4 05/13/2024    WBC 6.4 04/05/2023    WBC 9.3 11/11/2022    WBC 7.4 11/04/2022    RBC 4.23 (L)  05/13/2024    RBC 4.36 (L) 04/05/2023    RBC 3.61 (L) 11/11/2022    RBC 4.25 (L) 11/04/2022    HGB 14.5 05/13/2024    HGB 14.6 04/05/2023    HGB 12.6 (L) 11/11/2022    HGB 14.6 11/04/2022    HCT 42.5 05/13/2024    HCT 43.0 04/05/2023    HCT 36.0 (L) 11/11/2022    HCT 42.5 11/04/2022     (H) 05/13/2024    MCV 99 04/05/2023     11/11/2022     11/04/2022    MCH 34.3 (H) 05/13/2024    MCHC 34.1 05/13/2024    MCHC 34.0 04/05/2023    MCHC 35.0 11/11/2022    MCHC 34.4 11/04/2022    RDW 13.0 05/13/2024    RDW 12.7 04/05/2023    RDW 12.5 11/11/2022    RDW 12.7 11/04/2022     05/13/2024     04/05/2023     (L) 11/11/2022     11/04/2022       Hepatic Function Panel:    Lab Results   Component Value Date    ALKPHOS 50 05/13/2024    ALT 27 05/13/2024    AST 30 05/13/2024    PROT 6.9 05/13/2024    BILITOT 0.8 05/13/2024       Magnesium:    Lab Results   Component Value Date    MG 1.40 (L) 11/11/2022       TSH:    Lab Results   Component Value Date    TSH 1.37 05/13/2024       PT/INR:    Lab Results   Component Value Date    PROTIME 12.0 06/03/2024    INR 1.1 06/03/2024       Diagnostic Studies:     MR cardiac morphology and function w and wo IV contrast  Result Date: 7/24/2024  Interpreted By:  Ken Espinoza, STUDY: MR CARDIAC RESONANCE IMAGING FOR VELOCITY FLOW MAPPING; MR CARDIAC MORPHOLOGY AND FUNCTION W AND WO IV CONTRAST;  7/23/2024 12:18 pm   INDICATION: Signs/Symptoms: Cardiomyopathy, ischemic; Signs/Symptoms:cardiomyopathy.       1. Normal left ventricular cavity size with severely depressed systolic function. Ejection fraction 34%. 2. Delayed enhancement imaging reveals non transmural subendocardial myocardial infarction of the basal inferior wall, and basal inferior septum. Transmural myocardial infarction of the mid inferior wall, and mid inferior septum. Non transmural subendocardial myocardial infarction of the basal/mid inferolateral wall. Non transmural  subendocardial myocardial infarction of the distal inferior wall. 3. Asymmetric air septal left ventricular hypertrophy. Basal anterior septum 1.2 cm. 4. Normal right ventricular cavity size with preserved systolic function. RV EF 63%.     MACRO: None   Signed by: Ken Espinoza 7/24/2024 5:54 PM Dictation workstation:   [id number]    Problem List:     Patient Active Problem List   Diagnosis    Atherosclerosis of native coronary artery of native heart without angina pectoris    Dyslipidemia    Hypertension    Prostate cancer (Multi)    Routine general medical examination at health care facility    Cardiomyopathy, ischemic    Hx of percutaneous transluminal coronary angioplasty    Old inferior wall myocardial infarction    Chronic systolic heart failure (Multi)    Abnormal EKG    BMI 26.0-26.9,adult    Never smoked tobacco    Encounter for medication adjustment    Cancer (Multi)       Asessment:     Problem List Items Addressed This Visit             ICD-10-CM    Atherosclerosis of native coronary artery of native heart without angina pectoris I25.10    Relevant Medications    metoprolol succinate XL (Toprol-XL) 25 mg 24 hr tablet    sacubitriL-valsartan (Entresto) 24-26 mg tablet    Other Relevant Orders    Follow Up In Cardiology    CBC    Comprehensive Metabolic Panel    Lipid Panel    Dyslipidemia E78.5    Relevant Orders    Follow Up In Cardiology    CBC    Comprehensive Metabolic Panel    Lipid Panel    Hypertension I10    Relevant Orders    Follow Up In Cardiology    CBC    Comprehensive Metabolic Panel    Lipid Panel    Follow Up In Cardiology    CBC    Comprehensive Metabolic Panel    Lipid Panel    Cardiomyopathy, ischemic I25.5    Relevant Medications    metoprolol succinate XL (Toprol-XL) 25 mg 24 hr tablet    sacubitriL-valsartan (Entresto) 24-26 mg tablet    Other Relevant Orders    Follow Up In Cardiology    CBC    Comprehensive Metabolic Panel    Lipid Panel    NM heart blood pool ejection fraction wall  motion (MUGA)    Hx of percutaneous transluminal coronary angioplasty Z98.61    Relevant Orders    Follow Up In Cardiology    [facility]    Comprehensive Metabolic Panel    Lipid Panel    Old inferior wall myocardial infarction I25.2    Relevant Medications    metoprolol succinate XL (Toprol-XL) 25 mg 24 hr tablet    sacubitriL-valsartan (Entresto) 24-26 mg tablet    Other Relevant Orders    Follow Up In Cardiology    [facility]    Comprehensive Metabolic Panel    Lipid Panel    Chronic systolic heart failure (Multi) I50.22    Relevant Medications    metoprolol succinate XL (Toprol-XL) 25 mg 24 hr tablet    sacubitriL-valsartan (Entresto) 24-26 mg tablet    Other Relevant Orders    Follow Up In Cardiology    [facility]    Comprehensive Metabolic Panel    Lipid Panel    NM heart blood pool ejection fraction wall motion (MUGA)    BMI 26.0-26.9,adult Z68.26    Relevant Orders    Follow Up In Cardiology    [facility]    Comprehensive Metabolic Panel    Lipid Panel    Never smoked tobacco Z78.9    Relevant Orders    Follow Up In Cardiology    [facility]    Comprehensive Metabolic Panel    Lipid Panel    Encounter for medication adjustment Z51.89    Relevant Orders    Follow Up In Cardiology    [facility]    Comprehensive Metabolic Panel    Lipid Panel

## 2024-08-12 ENCOUNTER — APPOINTMENT (OUTPATIENT)
Dept: RADIOLOGY | Facility: CLINIC | Age: 74
End: 2024-08-12
Payer: MEDICARE

## 2024-09-04 DIAGNOSIS — I25.10 ATHEROSCLEROSIS OF NATIVE CORONARY ARTERY OF NATIVE HEART WITHOUT ANGINA PECTORIS: ICD-10-CM

## 2024-09-04 DIAGNOSIS — E78.5 DYSLIPIDEMIA: ICD-10-CM

## 2024-09-04 RX ORDER — EZETIMIBE 10 MG/1
10 TABLET ORAL DAILY
Qty: 90 TABLET | Refills: 1 | Status: SHIPPED | OUTPATIENT
Start: 2024-09-04

## 2024-09-04 NOTE — TELEPHONE ENCOUNTER
Rec'de fax from [facility] requesting refill of Zetia 10 mg daily. E-scribed as requested.    Last seen 8/8/24  Pending appt 12/12/24

## 2024-09-05 ENCOUNTER — OFFICE VISIT (OUTPATIENT)
Dept: WOUND CARE | Facility: CLINIC | Age: 74
End: 2024-09-05
Payer: MEDICARE

## 2024-09-05 PROCEDURE — 99213 OFFICE O/P EST LOW 20 MIN: CPT | Mod: 25

## 2024-09-05 PROCEDURE — 99213 OFFICE O/P EST LOW 20 MIN: CPT | Performed by: PLASTIC SURGERY

## 2024-09-05 PROCEDURE — 11042 DBRDMT SUBQ TIS 1ST 20SQCM/<: CPT

## 2024-09-05 PROCEDURE — 11042 DBRDMT SUBQ TIS 1ST 20SQCM/<: CPT | Performed by: PLASTIC SURGERY

## 2024-09-12 ENCOUNTER — OFFICE VISIT (OUTPATIENT)
Dept: WOUND CARE | Facility: CLINIC | Age: 74
End: 2024-09-12
Payer: MEDICARE

## 2024-09-12 PROCEDURE — 99212 OFFICE O/P EST SF 10 MIN: CPT

## 2024-09-12 PROCEDURE — 99212 OFFICE O/P EST SF 10 MIN: CPT | Performed by: PLASTIC SURGERY

## 2024-09-17 DIAGNOSIS — I50.22 CHRONIC SYSTOLIC HEART FAILURE: ICD-10-CM

## 2024-09-17 DIAGNOSIS — I25.5 CARDIOMYOPATHY, ISCHEMIC: ICD-10-CM

## 2024-09-17 NOTE — TELEPHONE ENCOUNTER
Medication request from [facility] for jardiance 10 mg tablet. 1 tablet daily.    Pending appt 12/12/24 with Dr. Sharma.    Manuela Frazier MA

## 2024-09-19 ENCOUNTER — OFFICE VISIT (OUTPATIENT)
Dept: WOUND CARE | Facility: CLINIC | Age: 74
End: 2024-09-19
Payer: MEDICARE

## 2024-09-19 PROCEDURE — 99212 OFFICE O/P EST SF 10 MIN: CPT

## 2024-09-19 PROCEDURE — 99212 OFFICE O/P EST SF 10 MIN: CPT | Performed by: PLASTIC SURGERY

## 2024-10-25 ENCOUNTER — LAB (OUTPATIENT)
Dept: LAB | Facility: LAB | Age: 74
End: 2024-10-25
Payer: MEDICARE

## 2024-10-25 ENCOUNTER — DOCUMENTATION (OUTPATIENT)
Dept: CARDIOLOGY | Facility: CLINIC | Age: 74
End: 2024-10-25
Payer: MEDICARE

## 2024-10-25 NOTE — PROGRESS NOTES
Received form for re-ordering of zoll life vest. Per Dr. Hutton's visit 05/23/2024:    Plan recommendations     I had a lengthy discussion with patient and family member regarding plan to follow for management of cardiomyopathy-congestive heart failure and sudden cardiac death.  I agree with LifeVest for now.     Patient just started medical therapy for heart failure in May 10th, 2024.  He will need to be reevaluated with a cardiac MRI in 3 months to see if there is any improvement of left ventricular ejection fraction.     Patient is to continue with LifeVest.  We will obtain an echocardiogram limited for left ventricular ejection fraction in the next 2 weeks.     Patient is to have a close follow-up with general cardiology service for adjustment of heart failure therapy if needed.     Patient also will be set up for cardiac catheterization for revascularization if possible in the next few days.  He needs to hold Jardiance for 3 days.     Follow my office after cardiac MRI has been performed in August 2024    Upon chart review: Patient has no pending orders for a cardiac MRI (last one was completed 07/2024) and has no pending follow up appointments with Dr. Hutton. Will route to Dr. Hutton to advise and to [facility] office staff.

## 2024-10-28 NOTE — PROGRESS NOTES
"Upon review of chart patient has been seen multiple times by General Cardiology and most recently being seen 8/8/24 by Dr. Sharma. At that time a MUGA was ordered (no pending appt scheduled).    Per last appt with Dr. Hutton 5/23/24 he dictated limited echo to be done 2 weeks and a cardiac MRI 3 months with a follow up after. (Cardiac MRI was already scheduled at that time and was to be postponed x 3 months \"in August\", but was done in July).     Cath done 6/6/24  Limited echo performed 5/29/24.   Cardiac mri done 7/23/24.   Routed to [facility] for follow up with Dr. Hutton as pt was to be seen post cardiac MRI.   "

## 2024-10-31 ENCOUNTER — ANCILLARY PROCEDURE (OUTPATIENT)
Dept: CARDIOLOGY | Facility: HOSPITAL | Age: 74
End: 2024-10-31
Payer: MEDICARE

## 2024-10-31 DIAGNOSIS — R93.1 ABNORMAL ECHOCARDIOGRAM: ICD-10-CM

## 2024-10-31 DIAGNOSIS — I65.23 BILATERAL CAROTID ARTERY STENOSIS: ICD-10-CM

## 2024-10-31 DIAGNOSIS — I25.5 CARDIOMYOPATHY, ISCHEMIC: ICD-10-CM

## 2024-10-31 PROCEDURE — 93880 EXTRACRANIAL BILAT STUDY: CPT

## 2024-10-31 PROCEDURE — 93880 EXTRACRANIAL BILAT STUDY: CPT | Performed by: INTERNAL MEDICINE

## 2024-11-11 ENCOUNTER — APPOINTMENT (OUTPATIENT)
Dept: CARDIOLOGY | Facility: CLINIC | Age: 74
End: 2024-11-11
Payer: MEDICARE

## 2024-11-11 VITALS
DIASTOLIC BLOOD PRESSURE: 70 MMHG | SYSTOLIC BLOOD PRESSURE: 110 MMHG | BODY MASS INDEX: 23.21 KG/M2 | WEIGHT: 157.2 LBS | HEART RATE: 76 BPM

## 2024-11-11 DIAGNOSIS — R94.31 ABNORMAL EKG: ICD-10-CM

## 2024-11-11 DIAGNOSIS — Z78.9 NEVER SMOKED TOBACCO: ICD-10-CM

## 2024-11-11 DIAGNOSIS — I25.5 CARDIOMYOPATHY, ISCHEMIC: Primary | ICD-10-CM

## 2024-11-11 DIAGNOSIS — I50.22 CHRONIC SYSTOLIC HEART FAILURE: ICD-10-CM

## 2024-11-11 DIAGNOSIS — I10 PRIMARY HYPERTENSION: ICD-10-CM

## 2024-11-11 PROCEDURE — 1159F MED LIST DOCD IN RCRD: CPT | Performed by: INTERNAL MEDICINE

## 2024-11-11 PROCEDURE — 3074F SYST BP LT 130 MM HG: CPT | Performed by: INTERNAL MEDICINE

## 2024-11-11 PROCEDURE — 99215 OFFICE O/P EST HI 40 MIN: CPT | Performed by: INTERNAL MEDICINE

## 2024-11-11 PROCEDURE — 3078F DIAST BP <80 MM HG: CPT | Performed by: INTERNAL MEDICINE

## 2024-11-11 PROCEDURE — 1123F ACP DISCUSS/DSCN MKR DOCD: CPT | Performed by: INTERNAL MEDICINE

## 2024-11-11 PROCEDURE — 1157F ADVNC CARE PLAN IN RCRD: CPT | Performed by: INTERNAL MEDICINE

## 2024-11-11 NOTE — PROGRESS NOTES
CARDIOLOGY OFFICE VISIT      CHIEF COMPLAINT  Chief Complaint   Patient presents with    Follow-up     results       HISTORY OF PRESENT ILLNESS  HPI  74-year-old male with a history of atherosclerotic heart disease with remote STEMI in November 2006. He underwent angioplasty and stenting of the RCA at that time. An echocardiogram in 2006 showed inferolateral wall hypokinesis and estimated LV ejection fraction of 45%. Aortic valve appeared to be bicuspid. A stress myocardial perfusion study showed mild inferior wall myocardial ischemia versus shifting diaphragmatic attenuation artifact. Calculated LV ejection fraction 44%. He has a history of hypertension, hyperlipidemia, and carotid artery disease.      In September 2023, a repeat echocardiogram showed an estimated LV ejection fraction of 30% with severe hypokinesis of the inferior and inferolateral walls. The distal anterior wall was hypokinetic. Right ventricular chamber size and function are normal. Valvular structure and function were normal. Carotid ultrasound October 5, 2023 showed 50 to 69% stenosis in the right proximal internal carotid artery and greater than 50% stenosis of the right external carotid artery. There was 50 to 69% stenosis of the left internal carotid artery. A myocardial perfusion study on December 4, 2023 showed moderate inferolateral myocardial infarction extends from apex to base. No evidence of myocardial ischemia by perfusion imaging. There was severe LV dysfunction with calculated LV ejection fraction of 29%. Moderate to high risk study though no significant ischemia identified.         Stress test December 2023  IMPRESSION:  Abnormal exercise Myoview cardiac perfusion stress test.  Moderate inferolateral myocardial infarction extending from apex to  base. No evidence of ischemia or myocardial infarction by perfusion  imaging. Severe left ventricular systolic dysfunction, ejection  fraction 29%. No exercise provoked significant  ischemic ECG changes  or chest pain symptoms. Noninvasive risk stratification: Moderate to  high risk though no significant ischemia identified. No previous  available for comparison.     Echocardiogram October 2023     CONCLUSIONS:   1. Left ventricular systolic function is severely decreased with a 30% estimated ejection fraction.   2. There is severe hypokinesis of inferior and inferolateral walls. The basal portion of inferior wall, akinetic. The distal anterior wall is mildly hyopkinetic as is distal anterolateral wall. There is some post extrasystolic potentiation and frequent pvc's.   3. Normal RV size and function      RVSP could not be calculated as no TR doppler envelope.   4. Left ventricular cavity size is moderately dilated.   5. Normal valve structure and function.   6. There are multiple wall motion abnormalities.  Patient states that so far he has been doing well.  He denies any symptoms of chest pain or shortness of palpitations.    Echocardiogram 5/2024  CONCLUSIONS:   1. Left ventricular systolic function is moderately to severely decreased with a 25-30% estimated ejection fraction.   2. There is severe hypokinesis of the majority the myocardium except for the interventricular septum and apical aspect of the septum which are only mildly hypokinetic. No apical thrombi identified. Study similar to that of October 2023.   3. Normal RV size and function      Mild pulm hypertension RVSP 33 mmHg.   4. Left ventricular cavity size is moderate to severely dilated.   5. Normal valve structure and function.   6. There is global hypokinesis of the left ventricle with minor regional variations.    Due to worsening cardiomyopathy, a cardiac resection was recommended during the last office visit in May 2024.    Catheterization June 2024  CONCLUSIONS:   1. Left Main Coronary Artery: This artery is normal.   2. Left Anterior Descending Artery: presents luminal irregularities and contains patent previously placed  stents.   3. Distal LAD Lesion: The percent stenosis is 70%.   4. Circumflex Coronary Artery: presents luminal irregularities and contains patent previously placed stents.   5. Right Coronary Artery: presents luminal irregularities, significantly obstructed and contains patent previously placed stents.   6. Mid RCA Lesion: The percent stenosis is 80%.   7. Mid RCA Lesion: pre-dilation, Resolute [place] 4.00x26 post-dilation: 0% residual stenosis. RCA: pre-procedure TERRA flow was 3(complete perfusion) and post-procedure TERRA flow was 3(complete perfusion).   8. The Left Ventricular Ejection Fraction is 25%.    Cardiac MRI was performed July 2024.    Cardiac MRI July 2024  IMPRESSION:  1. Normal left ventricular cavity size with severely depressed  systolic function. Ejection fraction 34%.  2. Delayed enhancement imaging reveals non transmural subendocardial  myocardial infarction of the basal inferior wall, and basal inferior  septum. Transmural myocardial infarction of the mid inferior wall,  and mid inferior septum. Non transmural subendocardial myocardial  infarction of the basal/mid inferolateral wall. Non transmural  subendocardial myocardial infarction of the distal inferior wall.  3. Asymmetric air septal left ventricular hypertrophy. Basal anterior  septum 1.2 cm.  4. Normal right ventricular cavity size with preserved systolic  function. RV EF 63%.      Patient states that so far he has been doing well.  He denies any symptoms of chest pain or shortness of breath or palpitations.    Patient was placed on Plavix and skin was getting bruise using a LifeVest.  He will return the LifeVest a week ago.          Past Medical History  Past Medical History:   Diagnosis Date    Cancer (Multi)     Prostate    CHF (congestive heart failure) (Multi)     Coronary artery disease     Hyperlipidemia     Hypertension     Ischemic cardiomyopathy     Right foot drop 02/17/2023       Social History  Social History     Tobacco Use     Smoking status: Never    Smokeless tobacco: Never   Vaping Use    Vaping status: Never Used   Substance Use Topics    Alcohol use: Not Currently     Alcohol/week: 1.0 standard drink of alcohol     Types: 1 Glasses of wine per week     Comment: on occ    Drug use: Never       Family History     Family History   Problem Relation Name Age of Onset    Hypertension Other Family history         Allergies:  No Known Allergies     Outpatient Medications:  Current Outpatient Medications   Medication Instructions    aspirin 81 mg EC tablet 1 tablet, Daily    atorvastatin (LIPITOR) 80 mg, oral, Daily    clopidogrel (PLAVIX) 75 mg, oral, Daily    empagliflozin (JARDIANCE) 10 mg, oral, Daily    ezetimibe (ZETIA) 10 mg, oral, Daily    fish oil concentrate (Omega-3) 120-180 mg capsule 1 capsule, Daily    metoprolol succinate XL (TOPROL-XL) 25 mg, oral, Daily    multivitamin with minerals tablet 1 tablet, Daily    nitroglycerin (NITROSTAT) 0.4 mg, sublingual, Every 5 min PRN, May repeat every 5 minutes for up to 3 doses.    sacubitriL-valsartan (Entresto) 24-26 mg tablet 1 TABLET TWICE A DAY    spironolactone (ALDACTONE) 12.5 mg, oral, Daily          REVIEW OF SYSTEMS  Review of Systems   All other systems reviewed and are negative.        VITALS  Vitals:    11/11/24 1237   BP: 110/70   Pulse: 76       PHYSICAL EXAM  Constitutional:       Appearance: Healthy appearance. Not in distress.   Neck:      Vascular: No JVR. JVD normal.   Pulmonary:      Effort: Pulmonary effort is normal.      Breath sounds: Normal breath sounds. No wheezing. No rhonchi. No rales.   Chest:      Chest wall: Not tender to palpatation.   Cardiovascular:      PMI at left midclavicular line. Normal rate. Regular rhythm. Normal S1. Normal S2.       Murmurs: There is no murmur.      No gallop.  No click. No rub.   Pulses:     Intact distal pulses.   Edema:     Peripheral edema absent.   Abdominal:      General: Bowel sounds are normal.      Palpations:  Abdomen is soft.      Tenderness: There is no abdominal tenderness.   Musculoskeletal: Normal range of motion.         General: No tenderness. Skin:     General: Skin is warm and dry.   Neurological:      General: No focal deficit present.      Mental Status: Alert and oriented to person, place and time.           ASSESSMENT AND PLAN  Clinical impression     1.  Cardiomyopathy with a left ventricular action fraction of 30% (September 2023  2.  Congestive heart failure, [place] heart association class II  3.  Evidence of ischemia per stress test in 2023  4.  History of coronary artery disease-ST elevation myocardial infarction in 2006 status post PCI of the right coronary artery.  Status post cardiac catheterization with PCI June 2024    Plan-recommendations    I had a lengthy discussion with patient regarding treatment for cardiomyopathy-congestive heart failure failure and risk for sudden cardiac death.  Patient had PCI in June 2024.  Will recommend a limited echocardiogram for left ventricular ejection fraction evaluation.  Depending results we will decide about ICD implantation.    Follow my office in next 2 to 3 weeks or sooner if needed.    Continue with medical therapy for heart failure ordered by cardiology team.    Risk factor modification and lifestyle modification discussed with patient. Diet , exercise and hydration discussed with patient.    I have personally review with patient during this office visit, laboratory data, echocardiogram results, stress test results, Holter-event monitor results prior and after the last electrophysiology visit. All questions has been answered.    Please excuse any errors in grammar or translation related to this dictation.  Voice recognition software was utilized to prepare this document.            Scribe Attestation  By signing my name below, IAnna LPN , Scribe   attest that this documentation has been prepared under the direction and in the presence of  Aaron Hutton MD.

## 2024-11-11 NOTE — PATIENT INSTRUCTIONS
Dr. Hutton is ordering a limited echo to be done 1-2 weeks for EF evaluation  Follow up after testing to discuss results      DID YOU KNOW  We have a pharmacy here in the [facility].  They can fill all prescriptions, not just cardiac medications.  Prescriptions from other pharmacies can easily be transferred to the  pharmacy by the  pharmacist on site.   pharmacies offer FREE HOME DELIVERY on medications to anywhere in [place]. They can sync your medications. Typically prescriptions can be ready in 10 - 15 minutes. If pharmacy is unable to fill your  prescription or if cost is more than your paying now the Pharmacist can easily transfer back to your Pharmacy of choice. Pharmacy phone # [phone].       Please bring all medicines, vitamins, and herbal supplements with you in original bottles to every appointment!!!!    Prescriptions will not be filled unless you are compliant with your follow up appointments or have a follow up appointment scheduled as per instruction of your physician. Refills should be requested at the time of your visit.

## 2024-11-19 DIAGNOSIS — I25.5 CARDIOMYOPATHY, ISCHEMIC: ICD-10-CM

## 2024-11-19 DIAGNOSIS — I50.22 CHRONIC SYSTOLIC HEART FAILURE: ICD-10-CM

## 2024-11-19 NOTE — TELEPHONE ENCOUNTER
Medication request from [employer] for jardiance 10 mg tablet 1 tablet daily.    Pending appt 12/2/24 with Rosalva Haynes.    Manuela Frazier MA

## 2024-11-25 ENCOUNTER — ANCILLARY PROCEDURE (OUTPATIENT)
Dept: CARDIOLOGY | Facility: HOSPITAL | Age: 74
End: 2024-11-25
Payer: MEDICARE

## 2024-11-25 DIAGNOSIS — I50.22 CHRONIC SYSTOLIC HEART FAILURE: ICD-10-CM

## 2024-11-25 DIAGNOSIS — I25.5 CARDIOMYOPATHY, ISCHEMIC: ICD-10-CM

## 2024-11-25 LAB
AORTIC VALVE MEAN GRADIENT: 2 MMHG
AORTIC VALVE PEAK VELOCITY: 0.96 M/S
AV PEAK GRADIENT: 4 MMHG
AVA (PEAK VEL): 3.09 CM2
AVA (VTI): 3.23 CM2
EJECTION FRACTION APICAL 4 CHAMBER: 28.9
EJECTION FRACTION: 33 %
LEFT VENTRICLE INTERNAL DIMENSION DIASTOLE: 4.49 CM (ref 3.5–6)
LEFT VENTRICULAR OUTFLOW TRACT DIAMETER: 2.4 CM
LV EJECTION FRACTION BIPLANE: 30 %
MITRAL VALVE E/A RATIO: 0.45

## 2024-11-25 PROCEDURE — C8924 2D TTE W OR W/O FOL W/CON,FU: HCPCS

## 2024-11-25 PROCEDURE — 93308 TTE F-UP OR LMTD: CPT | Performed by: INTERNAL MEDICINE

## 2024-11-25 PROCEDURE — 2500000004 HC RX 250 GENERAL PHARMACY W/ HCPCS (ALT 636 FOR OP/ED): Performed by: INTERNAL MEDICINE

## 2024-12-02 ENCOUNTER — APPOINTMENT (OUTPATIENT)
Dept: CARDIOLOGY | Facility: CLINIC | Age: 74
End: 2024-12-02
Payer: MEDICARE

## 2024-12-02 VITALS
HEIGHT: 69 IN | SYSTOLIC BLOOD PRESSURE: 110 MMHG | HEART RATE: 68 BPM | DIASTOLIC BLOOD PRESSURE: 60 MMHG | WEIGHT: 158.6 LBS | BODY MASS INDEX: 23.49 KG/M2

## 2024-12-02 DIAGNOSIS — I10 PRIMARY HYPERTENSION: ICD-10-CM

## 2024-12-02 DIAGNOSIS — I25.5 CARDIOMYOPATHY, ISCHEMIC: ICD-10-CM

## 2024-12-02 DIAGNOSIS — I50.22 CHRONIC SYSTOLIC HEART FAILURE: ICD-10-CM

## 2024-12-02 DIAGNOSIS — Z78.9 NEVER SMOKED TOBACCO: ICD-10-CM

## 2024-12-02 DIAGNOSIS — R94.31 ABNORMAL EKG: ICD-10-CM

## 2024-12-02 PROCEDURE — 3078F DIAST BP <80 MM HG: CPT | Performed by: INTERNAL MEDICINE

## 2024-12-02 PROCEDURE — 1036F TOBACCO NON-USER: CPT | Performed by: INTERNAL MEDICINE

## 2024-12-02 PROCEDURE — 99443 PR PHYS/QHP TELEPHONE EVALUATION 21-30 MIN: CPT | Performed by: INTERNAL MEDICINE

## 2024-12-02 PROCEDURE — 1159F MED LIST DOCD IN RCRD: CPT | Performed by: INTERNAL MEDICINE

## 2024-12-02 PROCEDURE — 1123F ACP DISCUSS/DSCN MKR DOCD: CPT | Performed by: INTERNAL MEDICINE

## 2024-12-02 PROCEDURE — 1157F ADVNC CARE PLAN IN RCRD: CPT | Performed by: INTERNAL MEDICINE

## 2024-12-02 PROCEDURE — 3074F SYST BP LT 130 MM HG: CPT | Performed by: INTERNAL MEDICINE

## 2024-12-02 NOTE — PROGRESS NOTES
CARDIOLOGY OFFICE VISIT      CHIEF COMPLAINT  Chief Complaint   Patient presents with    Follow-up     Follow up for testing results       HISTORY OF PRESENT ILLNESS  HPI  74-year-old male with a history of atherosclerotic heart disease with remote STEMI in November 2006. He underwent angioplasty and stenting of the RCA at that time. An echocardiogram in 2006 showed inferolateral wall hypokinesis and estimated LV ejection fraction of 45%. Aortic valve appeared to be bicuspid. A stress myocardial perfusion study showed mild inferior wall myocardial ischemia versus shifting diaphragmatic attenuation artifact. Calculated LV ejection fraction 44%. He has a history of hypertension, hyperlipidemia, and carotid artery disease.      In September 2023, a repeat echocardiogram showed an estimated LV ejection fraction of 30% with severe hypokinesis of the inferior and inferolateral walls. The distal anterior wall was hypokinetic. Right ventricular chamber size and function are normal. Valvular structure and function were normal. Carotid ultrasound October 5, 2023 showed 50 to 69% stenosis in the right proximal internal carotid artery and greater than 50% stenosis of the right external carotid artery. There was 50 to 69% stenosis of the left internal carotid artery. A myocardial perfusion study on December 4, 2023 showed moderate inferolateral myocardial infarction extends from apex to base. No evidence of myocardial ischemia by perfusion imaging. There was severe LV dysfunction with calculated LV ejection fraction of 29%. Moderate to high risk study though no significant ischemia identified.         Stress test December 2023  IMPRESSION:  Abnormal exercise Myoview cardiac perfusion stress test.  Moderate inferolateral myocardial infarction extending from apex to  base. No evidence of ischemia or myocardial infarction by perfusion  imaging. Severe left ventricular systolic dysfunction, ejection  fraction 29%. No exercise  provoked significant ischemic ECG changes  or chest pain symptoms. Noninvasive risk stratification: Moderate to  high risk though no significant ischemia identified. No previous  available for comparison.     Echocardiogram October 2023     CONCLUSIONS:   1. Left ventricular systolic function is severely decreased with a 30% estimated ejection fraction.   2. There is severe hypokinesis of inferior and inferolateral walls. The basal portion of inferior wall, akinetic. The distal anterior wall is mildly hyopkinetic as is distal anterolateral wall. There is some post extrasystolic potentiation and frequent pvc's.   3. Normal RV size and function      RVSP could not be calculated as no TR doppler envelope.   4. Left ventricular cavity size is moderately dilated.   5. Normal valve structure and function.   6. There are multiple wall motion abnormalities.  Patient states that so far he has been doing well.  He denies any symptoms of chest pain or shortness of palpitations.     Echocardiogram 5/2024  CONCLUSIONS:   1. Left ventricular systolic function is moderately to severely decreased with a 25-30% estimated ejection fraction.   2. There is severe hypokinesis of the majority the myocardium except for the interventricular septum and apical aspect of the septum which are only mildly hypokinetic. No apical thrombi identified. Study similar to that of October 2023.   3. Normal RV size and function      Mild pulm hypertension RVSP 33 mmHg.   4. Left ventricular cavity size is moderate to severely dilated.   5. Normal valve structure and function.   6. There is global hypokinesis of the left ventricle with minor regional variations.     Due to worsening cardiomyopathy, a cardiac  catheterization was recommended during the last office visit in May 2024.     Catheterization June 2024  CONCLUSIONS:   1. Left Main Coronary Artery: This artery is normal.   2. Left Anterior Descending Artery: presents luminal irregularities and  contains patent previously placed stents.   3. Distal LAD Lesion: The percent stenosis is 70%.   4. Circumflex Coronary Artery: presents luminal irregularities and contains patent previously placed stents.   5. Right Coronary Artery: presents luminal irregularities, significantly obstructed and contains patent previously placed stents.   6. Mid RCA Lesion: The percent stenosis is 80%.   7. Mid RCA Lesion: pre-dilation, Resolute [place] 4.00x26 post-dilation: 0% residual stenosis. RCA: pre-procedure TERRA flow was 3(complete perfusion) and post-procedure TERRA flow was 3(complete perfusion).   8. The Left Ventricular Ejection Fraction is 25%.     Cardiac MRI was performed July 2024.     Cardiac MRI July 2024  IMPRESSION:  1. Normal left ventricular cavity size with severely depressed  systolic function. Ejection fraction 34%.  2. Delayed enhancement imaging reveals non transmural subendocardial  myocardial infarction of the basal inferior wall, and basal inferior  septum. Transmural myocardial infarction of the mid inferior wall,  and mid inferior septum. Non transmural subendocardial myocardial  infarction of the basal/mid inferolateral wall. Non transmural  subendocardial myocardial infarction of the distal inferior wall.  3. Asymmetric air septal left ventricular hypertrophy. Basal anterior  septum 1.2 cm.  4. Normal right ventricular cavity size with preserved systolic  function. RV EF 63%.    Echocardiogram November 2024  CONCLUSIONS:   1. The left ventricular systolic function is moderately decreased, with a visually estimated ejection fraction of 30-35%.   2. There is global hypokinesis of the left ventricle with minor regional variations.   3. Left ventricular cavity size is mildly dilated.   4. On [id number] comparison with the study of May 2024 LV chamber dimension is not quite as dilated. Overall ejection fraction appears slightly improved still less than 35%. There remains akinesis of the inferior  inferolateral wall. Anterolateral wall is still hypokinetic but significantly improved in comparison to prior study.    Patient states that he has been doing well.  Denies any symptoms of chest pain or shortness breath or palpitations.    I have personally discussed results of the echocardiogram with patient.    Past Medical History  Past Medical History:   Diagnosis Date    Cancer (Multi)     Prostate    CHF (congestive heart failure)     Coronary artery disease     Hyperlipidemia     Hypertension     Ischemic cardiomyopathy     Right foot drop 02/17/2023       Social History  Social History     Tobacco Use    Smoking status: Never    Smokeless tobacco: Never   Vaping Use    Vaping status: Never Used   Substance Use Topics    Alcohol use: Not Currently     Alcohol/week: 1.0 standard drink of alcohol     Types: 1 Glasses of wine per week     Comment: on occ    Drug use: Never       Family History     Family History   Problem Relation Name Age of Onset    Heart disease Mother      Hypertension Other Family history         Allergies:  No Known Allergies     Outpatient Medications:  Current Outpatient Medications   Medication Instructions    aspirin 81 mg EC tablet 1 tablet, Daily    atorvastatin (LIPITOR) 80 mg, oral, Daily    clopidogrel (PLAVIX) 75 mg, oral, Daily    empagliflozin (JARDIANCE) 10 mg, oral, Daily    ezetimibe (ZETIA) 10 mg, oral, Daily    fish oil concentrate (Omega-3) 120-180 mg capsule 1 capsule, Daily    metoprolol succinate XL (TOPROL-XL) 25 mg, oral, Daily    multivitamin with minerals tablet 1 tablet, Daily    nitroglycerin (NITROSTAT) 0.4 mg, sublingual, Every 5 min PRN, May repeat every 5 minutes for up to 3 doses.    sacubitriL-valsartan (Entresto) 24-26 mg tablet 1 TABLET TWICE A DAY    spironolactone (ALDACTONE) 12.5 mg, oral, Daily          REVIEW OF SYSTEMS  Review of Systems   All other systems reviewed and are negative.        VITALS  Vitals:    12/02/24 0845   BP: 110/60   Pulse: 68        PHYSICAL EXAM  Physical Exam  Alert oriented x 3  No motor or sensory deficit  No edema in the lower extremity per patient's report    ASSESSMENT AND PLAN  Clinical impression     1.  Cardiomyopathy with a left ventricular action fraction of 30% (September 2023).  Echocardiogram 2024 (November) left ventricular ejection fraction of 30 to 35%  2.  Congestive heart failure, [place] heart association class II  3.  Evidence of ischemia per stress test in 2023  4.  History of coronary artery disease-ST elevation myocardial infarction in 2006 status post PCI of the right coronary artery.  Status post cardiac catheterization with PCI June 2024    Plan-recommendations    I had a lengthy discussion with patient regarding findings of the recent echocardiogram.  Patient qualifies for dual-chamber ICD implantation  Procedure, risk, benefits and possible complications were explained to patient.  All questions were answered.  Patient agrees with plan.  Informed consent was signed.    I have personally discussed with patient procedure, risk, benefits and possible complications of device implantation.  [place] shared decision-making tool and Medicare shared decision-making tool was used for decision and recommendation of device therapy and discussed with patient.    Patient agrees with plan.  Orders for ICD will be placed.    Patient would like to think about timing for his procedure.    Hold Jardiance 7 days prior to procedure.    Follow device clinic as scheduled.    Follow my office 7 days postprocedure for wound assessment.    Risk factor modification and lifestyle modification discussed with patient. Diet , exercise and hydration discussed with patient.    I have personally review with patient during this office visit, laboratory data, echocardiogram results, stress test results, Holter-event monitor results prior and after the last electrophysiology visit. All questions has been answered.    Please excuse any errors  in grammar or translation related to this dictation.  Voice recognition software was utilized to prepare this document.

## 2024-12-03 DIAGNOSIS — I21.A9 OTHER MYOCARDIAL INFARCTION TYPE (MULTI): ICD-10-CM

## 2024-12-03 DIAGNOSIS — I42.9 CARDIOMYOPATHY, UNSPECIFIED TYPE (MULTI): Primary | ICD-10-CM

## 2024-12-03 DIAGNOSIS — I25.5 ISCHEMIC CARDIOMYOPATHY: ICD-10-CM

## 2024-12-03 DIAGNOSIS — I50.22 CHRONIC SYSTOLIC (CONGESTIVE) HEART FAILURE: ICD-10-CM

## 2024-12-03 RX ORDER — CHLORHEXIDINE GLUCONATE 40 MG/ML
SOLUTION TOPICAL ONCE
OUTPATIENT
Start: 2024-12-03 | End: 2024-12-03

## 2024-12-03 RX ORDER — MUPIROCIN 20 MG/G
1 OINTMENT TOPICAL ONCE
OUTPATIENT
Start: 2024-12-03 | End: 2024-12-03

## 2024-12-12 ENCOUNTER — APPOINTMENT (OUTPATIENT)
Dept: CARDIOLOGY | Facility: CLINIC | Age: 74
End: 2024-12-12
Payer: MEDICARE

## 2024-12-23 ENCOUNTER — LAB (OUTPATIENT)
Dept: LAB | Facility: LAB | Age: 74
End: 2024-12-23
Payer: MEDICARE

## 2024-12-23 DIAGNOSIS — Z98.61 HX OF PERCUTANEOUS TRANSLUMINAL CORONARY ANGIOPLASTY: ICD-10-CM

## 2024-12-23 DIAGNOSIS — Z78.9 NEVER SMOKED TOBACCO: ICD-10-CM

## 2024-12-23 DIAGNOSIS — I42.9 CARDIOMYOPATHY, UNSPECIFIED TYPE (MULTI): ICD-10-CM

## 2024-12-23 DIAGNOSIS — E78.5 DYSLIPIDEMIA: ICD-10-CM

## 2024-12-23 DIAGNOSIS — I21.A9 OTHER MYOCARDIAL INFARCTION TYPE (MULTI): ICD-10-CM

## 2024-12-23 DIAGNOSIS — I25.10 ATHEROSCLEROSIS OF NATIVE CORONARY ARTERY OF NATIVE HEART WITHOUT ANGINA PECTORIS: ICD-10-CM

## 2024-12-23 DIAGNOSIS — I50.22 CHRONIC SYSTOLIC HEART FAILURE: ICD-10-CM

## 2024-12-23 DIAGNOSIS — I10 PRIMARY HYPERTENSION: ICD-10-CM

## 2024-12-23 DIAGNOSIS — Z51.89 ENCOUNTER FOR MEDICATION ADJUSTMENT: ICD-10-CM

## 2024-12-23 DIAGNOSIS — I25.5 CARDIOMYOPATHY, ISCHEMIC: ICD-10-CM

## 2024-12-23 DIAGNOSIS — I15.9 SECONDARY HYPERTENSION: ICD-10-CM

## 2024-12-23 DIAGNOSIS — I25.2 OLD INFERIOR WALL MYOCARDIAL INFARCTION: ICD-10-CM

## 2024-12-23 LAB
ALBUMIN SERPL BCP-MCNC: 4.4 G/DL (ref 3.4–5)
ALP SERPL-CCNC: 67 U/L (ref 33–136)
ALT SERPL W P-5'-P-CCNC: 27 U/L (ref 10–52)
ANION GAP SERPL CALC-SCNC: 12 MMOL/L (ref 10–20)
APTT PPP: 30 SECONDS (ref 27–38)
AST SERPL W P-5'-P-CCNC: 29 U/L (ref 9–39)
BILIRUB SERPL-MCNC: 0.8 MG/DL (ref 0–1.2)
BUN SERPL-MCNC: 18 MG/DL (ref 6–23)
CALCIUM SERPL-MCNC: 9.5 MG/DL (ref 8.6–10.3)
CHLORIDE SERPL-SCNC: 106 MMOL/L (ref 98–107)
CHOLEST SERPL-MCNC: 119 MG/DL (ref 0–199)
CHOLESTEROL/HDL RATIO: 2.4
CO2 SERPL-SCNC: 28 MMOL/L (ref 21–32)
CREAT SERPL-MCNC: 1.13 MG/DL (ref 0.5–1.3)
EGFRCR SERPLBLD CKD-EPI 2021: 67 ML/MIN/1.73M*2
ERYTHROCYTE [DISTWIDTH] IN BLOOD BY AUTOMATED COUNT: 12.8 % (ref 11.5–14.5)
GLUCOSE SERPL-MCNC: 93 MG/DL (ref 74–99)
HCT VFR BLD AUTO: 43.4 % (ref 41–52)
HDLC SERPL-MCNC: 49.7 MG/DL
HGB BLD-MCNC: 14.7 G/DL (ref 13.5–17.5)
INR PPP: 1 (ref 0.9–1.1)
LDLC SERPL CALC-MCNC: 57 MG/DL
MCH RBC QN AUTO: 34.2 PG (ref 26–34)
MCHC RBC AUTO-ENTMCNC: 33.9 G/DL (ref 32–36)
MCV RBC AUTO: 101 FL (ref 80–100)
NON HDL CHOLESTEROL: 69 MG/DL (ref 0–149)
NRBC BLD-RTO: 0 /100 WBCS (ref 0–0)
PLATELET # BLD AUTO: 149 X10*3/UL (ref 150–450)
POTASSIUM SERPL-SCNC: 4.8 MMOL/L (ref 3.5–5.3)
PROT SERPL-MCNC: 6.7 G/DL (ref 6.4–8.2)
PROTHROMBIN TIME: 11.7 SECONDS (ref 9.8–12.8)
RBC # BLD AUTO: 4.3 X10*6/UL (ref 4.5–5.9)
SODIUM SERPL-SCNC: 141 MMOL/L (ref 136–145)
TRIGL SERPL-MCNC: 64 MG/DL (ref 0–149)
VLDL: 13 MG/DL (ref 0–40)
WBC # BLD AUTO: 6.9 X10*3/UL (ref 4.4–11.3)

## 2024-12-23 PROCEDURE — 85027 COMPLETE CBC AUTOMATED: CPT

## 2024-12-23 PROCEDURE — 36415 COLL VENOUS BLD VENIPUNCTURE: CPT

## 2024-12-23 PROCEDURE — 80061 LIPID PANEL: CPT

## 2024-12-23 PROCEDURE — 85730 THROMBOPLASTIN TIME PARTIAL: CPT

## 2024-12-23 PROCEDURE — 85610 PROTHROMBIN TIME: CPT

## 2024-12-23 PROCEDURE — 80053 COMPREHEN METABOLIC PANEL: CPT

## 2024-12-26 ENCOUNTER — TELEPHONE (OUTPATIENT)
Dept: CARDIOLOGY | Facility: CLINIC | Age: 74
End: 2024-12-26

## 2024-12-26 ENCOUNTER — APPOINTMENT (OUTPATIENT)
Dept: CARDIOLOGY | Facility: CLINIC | Age: 74
End: 2024-12-26
Payer: MEDICARE

## 2024-12-26 VITALS
DIASTOLIC BLOOD PRESSURE: 62 MMHG | HEART RATE: 57 BPM | BODY MASS INDEX: 23.37 KG/M2 | WEIGHT: 157.8 LBS | SYSTOLIC BLOOD PRESSURE: 100 MMHG | HEIGHT: 69 IN

## 2024-12-26 DIAGNOSIS — I50.22 CHRONIC SYSTOLIC HEART FAILURE: ICD-10-CM

## 2024-12-26 DIAGNOSIS — I25.5 CARDIOMYOPATHY, ISCHEMIC: ICD-10-CM

## 2024-12-26 DIAGNOSIS — Z78.9 NEVER SMOKED TOBACCO: ICD-10-CM

## 2024-12-26 DIAGNOSIS — I25.10 ATHEROSCLEROSIS OF NATIVE CORONARY ARTERY OF NATIVE HEART WITHOUT ANGINA PECTORIS: ICD-10-CM

## 2024-12-26 DIAGNOSIS — E78.5 DYSLIPIDEMIA: ICD-10-CM

## 2024-12-26 DIAGNOSIS — I10 PRIMARY HYPERTENSION: ICD-10-CM

## 2024-12-26 DIAGNOSIS — Z51.89 ENCOUNTER FOR MEDICATION ADJUSTMENT: ICD-10-CM

## 2024-12-26 DIAGNOSIS — Z98.61 HX OF PERCUTANEOUS TRANSLUMINAL CORONARY ANGIOPLASTY: ICD-10-CM

## 2024-12-26 DIAGNOSIS — I25.2 OLD INFERIOR WALL MYOCARDIAL INFARCTION: ICD-10-CM

## 2024-12-26 DIAGNOSIS — I15.9 SECONDARY HYPERTENSION: ICD-10-CM

## 2024-12-26 PROCEDURE — 3078F DIAST BP <80 MM HG: CPT | Performed by: INTERNAL MEDICINE

## 2024-12-26 PROCEDURE — 1036F TOBACCO NON-USER: CPT | Performed by: INTERNAL MEDICINE

## 2024-12-26 PROCEDURE — 3074F SYST BP LT 130 MM HG: CPT | Performed by: INTERNAL MEDICINE

## 2024-12-26 PROCEDURE — 1123F ACP DISCUSS/DSCN MKR DOCD: CPT | Performed by: INTERNAL MEDICINE

## 2024-12-26 PROCEDURE — 1157F ADVNC CARE PLAN IN RCRD: CPT | Performed by: INTERNAL MEDICINE

## 2024-12-26 PROCEDURE — 99214 OFFICE O/P EST MOD 30 MIN: CPT | Performed by: INTERNAL MEDICINE

## 2024-12-26 PROCEDURE — 1159F MED LIST DOCD IN RCRD: CPT | Performed by: INTERNAL MEDICINE

## 2024-12-26 RX ORDER — METOPROLOL SUCCINATE 25 MG/1
25 TABLET, EXTENDED RELEASE ORAL DAILY
Qty: 90 TABLET | Refills: 1 | Status: SHIPPED | OUTPATIENT
Start: 2024-12-26 | End: 2025-12-26

## 2024-12-26 RX ORDER — SACUBITRIL AND VALSARTAN 24; 26 MG/1; MG/1
TABLET, FILM COATED ORAL
Qty: 180 TABLET | Refills: 1 | Status: SHIPPED | OUTPATIENT
Start: 2024-12-26

## 2024-12-26 RX ORDER — SPIRONOLACTONE 25 MG/1
12.5 TABLET ORAL DAILY
Qty: 45 TABLET | Refills: 1 | Status: SHIPPED | OUTPATIENT
Start: 2024-12-26 | End: 2025-12-26

## 2024-12-26 RX ORDER — EZETIMIBE 10 MG/1
10 TABLET ORAL DAILY
Qty: 90 TABLET | Refills: 1 | Status: SHIPPED | OUTPATIENT
Start: 2024-12-26 | End: 2025-12-26

## 2024-12-26 NOTE — PROGRESS NOTES
Patient:  Mario Pringle  YOB: 1950  MRN: [id number]       HPI:       Mario Pringle is a 74 y.o. male who returns today for cardiac follow-up.   He has a history of atherosclerotic heart disease with remote STEMI in November 2006. He underwent angioplasty and stenting of the RCA at that time. An echocardiogram in 2006 showed inferolateral wall hypokinesis and an estimated LV ejection fraction of 45%. Aortic valve appeared to be bicuspid. A stress myocardial perfusion study showed mild inferior wall myocardial ischemia versus shifting diaphragmatic attenuation artifact. Calculated LV ejection fraction 44%. He has a history of hypertension, hyperlipidemia, and carotid artery disease. He has been followed on a regular basis by Dr. Berry Childress. He is maintained on aspirin, atorvastatin, and Toprol-XL.     At the time of his initial visit with me on September 12, 2023 he was doing well.  He noted that he typically takes a brisk 5 to 6 mile walk daily.  He was scheduled for a repeat carotid ultrasound as well as an echocardiogram to reassess LV ejection fraction and reported bicuspid aortic valve.  The echocardiogram showed an estimated LV ejection fraction of 30% with severe hypokinesis of the inferior and inferolateral walls.  The distal anterior wall was hypokinetic.  Right ventricular chamber size and function normal.  Valvular structure and function were normal.  Carotid ultrasound October 5, 2023 showed 50 to 69% stenosis in the right proximal internal carotid artery and greater than 50% stenosis of the right external carotid artery.  There was 50 to 69% stenosis of the left internal carotid artery.  A myocardial perfusion study on December 4, 2023 showed moderate inferolateral myocardial infarction extending from apex to base.  No evidence of myocardial ischemia by perfusion imaging.  There was severe LV dysfunction and a calculated LV ejection fraction of 29%.  Moderate to high risk study  though no significant ischemia identified.     It appeared that he likely had an extensive inferior myocardial infarction sometime ago.  No ischemia was identified on the stress test and he was not having any active anginal or CHF symptoms.  He was continued on Toprol-XL.  He was started on Entresto 24/26 mg twice daily and Jardiance 10 mg daily.  He had a LifeVest placed.  Entresto was subsequently increased to 49/51 mg twice daily.  Cardiac catheterization by Dr. Nieves June 6, 2024 showed normal left main trunk, patent LAD stents, 70% stenosis of the distal LAD, patent circumflex stents with mild luminal irregularities, 80% stenosis of the mid RCA.  He underwent angioplasty and stenting of the RCA.  Estimated LV ejection fraction was 25%.       He was seen by Rosalva Haynes CNP on July 9, 2024.   He was doing well but noted some low blood pressures after taking his morning medications.  He noted some associated lightheadedness.  His blood pressure in the office was 82/50.  Toprol-XL was decreased to 25 mg daily and Entresto was decreased to 24/26 mg twice daily.   A cardiac MRI July 23, 2024 showed a calculated LV ejection fraction of 34%.  There was a nontransmural subendocardial myocardial infarction involving the basal inferior wall and basal inferior septal segment.  RV ejection fraction 63%.    Limited echo November 25, 2024 showed an estimated LV ejection fraction 30 to 35%.    He was seen by Dr. Hutton December 2, 2024 and his scheduled to undergo dual-chamber ICD implantation January 7, 2025.  He generally has been doing well since his last visit with me. 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  He remains very active without limitations.  He currently is wearing a LifeVest mostly when he is home at night or when sleeping.  He denies any chest pain or shortness of breath. He denies any orthopnea, PND, or increasing peripheral edema. He denies any palpitations, lightheadedness,  near-syncope, or syncope. He denies any fever, chills, or cough. He denies any nausea, vomiting, or diaphoresis. He denies any hemoptysis, hematemesis, melena, or hematochezia. Lab studies December 23, 2024 showed a normal CBC with exception of platelets 149 and RBC 4.30.  Comprehensive metabolic profile was normal.  Cholesterol 119 with HDL 50, LDL 57, triglycerides 64.     We have been limited in uptitrating his medications secondary to persistent low blood pressure readings.  Blood pressure in the office today is 100/62 mmHg.  His he will continue on Toprol-XL 25 mg daily, Entresto 24/26 mg twice daily, Aldactone 12.5 mg daily, and Jardiance 10 mg daily.   His RCA stent was placed June 6, 2024.  He will hold Jardiance 3 days prior to the ICD implant.  Other details as noted below.    The above portion of this note was dictated by me using voice recognition software. I personally performed the services described in the documentation. The scribe entering the documentation below was in my presence. I affirm that the information is both accurate and complete.       Objective:     Vitals:    12/26/24 0808   BP: 100/62   Pulse: 57       Wt Readings from Last 4 Encounters:   12/26/24 71.6 kg (157 lb 12.8 oz)   12/02/24 71.9 kg (158 lb 9.6 oz)   11/11/24 71.3 kg (157 lb 3.2 oz)   08/08/24 70.4 kg (155 lb 3.2 oz)       Allergies:     No Known Allergies       Medications:     Current Outpatient Medications   Medication Instructions    aspirin 81 mg EC tablet 1 tablet, Daily    atorvastatin (LIPITOR) 80 mg, oral, Daily    clopidogrel (PLAVIX) 75 mg, oral, Daily    empagliflozin (JARDIANCE) 10 mg, oral, Daily    ezetimibe (ZETIA) 10 mg, oral, Daily    fish oil concentrate (Omega-3) 120-180 mg capsule 1 capsule, Daily    metoprolol succinate XL (TOPROL-XL) 25 mg, oral, Daily    multivitamin with minerals tablet 1 tablet, Daily    nitroglycerin (NITROSTAT) 0.4 mg, sublingual, Every 5 min PRN, May repeat every 5 minutes for up  to 3 doses.    sacubitriL-valsartan (Entresto) 24-26 mg tablet 1 TABLET TWICE A DAY    spironolactone (ALDACTONE) 12.5 mg, oral, Daily       Physical Examination:   GENERAL:  Well developed, well nourished, in no acute distress.  CHEST:  Symmetric and nontender.  NEURO/PSYCH:  Alert and oriented times three with approppriate behavior and responses.  NECK:  Supple, no JVD, no bruit.  LUNGS:  Clear to auscultation bilaterally, normal respiratory effort.  HEART:  Rate and rhythm regular with no evident murmur, no gallop appreciated.        There are no rubs, clicks or heaves.  EXTREMITIES:  Warm with good color, no clubbing or cyanosis.  There is no edema noted.  PERIPHERAL VASCULAR:  Pulses present and equally palpable; 2+ throughout.      Lab:     CBC:   Lab Results   Component Value Date    WBC 6.9 12/23/2024    RBC 4.30 (L) 12/23/2024    HGB 14.7 12/23/2024    HCT 43.4 12/23/2024     (L) 12/23/2024        CMP:    Lab Results   Component Value Date     12/23/2024    K 4.8 12/23/2024     12/23/2024    CO2 28 12/23/2024    BUN 18 12/23/2024    CREATININE 1.13 12/23/2024    GLUCOSE 93 12/23/2024    CALCIUM 9.5 12/23/2024       Lipid Profile:    Lab Results   Component Value Date    TRIG 64 12/23/2024    HDL 49.7 12/23/2024    LDLCALC 57 12/23/2024    LDLDIRECT 85 04/26/2022       BMP:  Lab Results   Component Value Date     12/23/2024     07/11/2024     06/03/2024    K 4.8 12/23/2024    K 4.2 07/11/2024    K 4.3 06/03/2024     12/23/2024     07/11/2024     06/03/2024    CO2 28 12/23/2024    CO2 27 07/11/2024    CO2 29 06/03/2024    BUN 18 12/23/2024    BUN 21 07/11/2024    BUN 19 06/03/2024    CREATININE 1.13 12/23/2024    CREATININE 1.16 07/11/2024    CREATININE 1.08 06/03/2024       CBC:  Lab Results   Component Value Date    WBC 6.9 12/23/2024    WBC 7.4 05/13/2024    WBC 6.4 04/05/2023    WBC 9.3 11/11/2022    WBC 7.4 11/04/2022    RBC 4.30 (L) 12/23/2024     RBC 4.23 (L) 05/13/2024    RBC 4.36 (L) 04/05/2023    RBC 3.61 (L) 11/11/2022    RBC 4.25 (L) 11/04/2022    HGB 14.7 12/23/2024    HGB 14.5 05/13/2024    HGB 14.6 04/05/2023    HGB 12.6 (L) 11/11/2022    HGB 14.6 11/04/2022    HCT 43.4 12/23/2024    HCT 42.5 05/13/2024    HCT 43.0 04/05/2023    HCT 36.0 (L) 11/11/2022    HCT 42.5 11/04/2022     (H) 12/23/2024     (H) 05/13/2024    MCV 99 04/05/2023     11/11/2022     11/04/2022    MCH 34.2 (H) 12/23/2024    MCH 34.3 (H) 05/13/2024    MCHC 33.9 12/23/2024    MCHC 34.1 05/13/2024    MCHC 34.0 04/05/2023    MCHC 35.0 11/11/2022    MCHC 34.4 11/04/2022    RDW 12.8 12/23/2024    RDW 13.0 05/13/2024    RDW 12.7 04/05/2023    RDW 12.5 11/11/2022    RDW 12.7 11/04/2022     (L) 12/23/2024     05/13/2024     04/05/2023     (L) 11/11/2022     11/04/2022       Hepatic Function Panel:    Lab Results   Component Value Date    ALKPHOS 67 12/23/2024    ALT 27 12/23/2024    AST 29 12/23/2024    PROT 6.7 12/23/2024    BILITOT 0.8 12/23/2024       Magnesium:    Lab Results   Component Value Date    MG 1.40 (L) 11/11/2022       TSH:    Lab Results   Component Value Date    TSH 1.37 05/13/2024     PT/INR:    Lab Results   Component Value Date    PROTIME 11.7 12/23/2024    INR 1.0 12/23/2024       Diagnostic Studies:     MR cardiac morphology and function w and wo IV contrast  Result Date: 7/24/2024  Interpreted By:  Ken Espinoza       1. Normal left ventricular cavity size with severely depressed systolic function. Ejection fraction 34%. 2. Delayed enhancement imaging reveals non transmural subendocardial myocardial infarction of the basal inferior wall, and basal inferior septum. Transmural myocardial infarction of the mid inferior wall, and mid inferior septum. Non transmural subendocardial myocardial infarction of the basal/mid inferolateral wall. Non transmural subendocardial myocardial infarction of the distal  inferior wall. 3. Asymmetric air septal left ventricular hypertrophy. Basal anterior septum 1.2 cm. 4. Normal right ventricular cavity size with preserved systolic function. RV EF 63%.     MACRO: None   Signed by: Ken Espinoza 7/24/2024 5:54 PM Dictation workstation:   [id number]      Problem List:     Patient Active Problem List   Diagnosis    Atherosclerosis of native coronary artery of native heart without angina pectoris    Dyslipidemia    Hypertension    Prostate cancer (Multi)    Routine general medical examination at health care facility    Cardiomyopathy, ischemic    Hx of percutaneous transluminal coronary angioplasty    Old inferior wall myocardial infarction    Chronic systolic heart failure    Abnormal EKG    BMI 26.0-26.9,adult    Never smoked tobacco    Encounter for medication adjustment    Cancer (Multi)    Cardiomyopathy    Ischemic cardiomyopathy    Chronic systolic (congestive) heart failure       Asessment:     Problem List Items Addressed This Visit             ICD-10-CM    Atherosclerosis of native coronary artery of native heart without angina pectoris I25.10    Relevant Medications    ezetimibe (Zetia) 10 mg tablet    metoprolol succinate XL (Toprol-XL) 25 mg 24 hr tablet    sacubitriL-valsartan (Entresto) 24-26 mg tablet    Other Relevant Orders    [facility]    Comprehensive Metabolic Panel    Lipid Panel    Follow Up In Cardiology    Dyslipidemia E78.5    Relevant Medications    ezetimibe (Zetia) 10 mg tablet    Other Relevant Orders    Follow Up In Cardiology    Hypertension I10    Relevant Medications    spironolactone (Aldactone) 25 mg tablet    Other Relevant Orders    Follow Up In Cardiology    CBC    Comprehensive Metabolic Panel    Lipid Panel    Follow Up In Cardiology    Cardiomyopathy, ischemic I25.5    Relevant Medications    empagliflozin (Jardiance) 10 mg    metoprolol succinate XL (Toprol-XL) 25 mg 24 hr tablet    sacubitriL-valsartan (Entresto) 24-26 mg tablet    Other Relevant  Orders    Follow Up In Cardiology    Hx of percutaneous transluminal coronary angioplasty Z98.61    Relevant Orders    Follow Up In Cardiology    Old inferior wall myocardial infarction I25.2    Relevant Medications    metoprolol succinate XL (Toprol-XL) 25 mg 24 hr tablet    sacubitriL-valsartan (Entresto) 24-26 mg tablet    Other Relevant Orders    Follow Up In Cardiology    Chronic systolic heart failure I50.22    Relevant Medications    empagliflozin (Jardiance) 10 mg    metoprolol succinate XL (Toprol-XL) 25 mg 24 hr tablet    sacubitriL-valsartan (Entresto) 24-26 mg tablet    Other Relevant Orders    Follow Up In Cardiology    BMI 26.0-26.9,adult Z68.26    Relevant Orders    Follow Up In Cardiology    Never smoked tobacco Z78.9    Relevant Orders    Follow Up In Cardiology    Encounter for medication adjustment Z51.89    Relevant Orders    Follow Up In Cardiology

## 2024-12-26 NOTE — TELEPHONE ENCOUNTER
Returned call to patient and advised him that he does not need to hold his plavix for his upcoming procedure with Dr. Hutton.

## 2024-12-26 NOTE — PATIENT INSTRUCTIONS
6 month follow up appointment   Please have Fasting Labs done 1 week before your next cardiology appointment     Hold Plavix for 5 days before surgery with Dr. Hutton      DID YOU KNOW  We have a pharmacy here in the [facility].  They can fill all prescriptions, not just cardiac medications.  Prescriptions from other pharmacies can easily be transferred to the  pharmacy by the  pharmacist on site.   pharmacies offer FREE HOME DELIVERY on medications to anywhere in [place]. They can sync your medications. Typically prescriptions can be ready in 10 - 15 minutes. If pharmacy is unable to fill your  prescription or if cost is more than your paying now the Pharmacist can easily transfer back to your Pharmacy of choice. Pharmacy phone # [phone].     Please bring all medicines, vitamins, and herbal supplements with you in original bottles to every appointment  Prescriptions will not be filled unless you are compliant with your follow up appointments or have a follow up appointment scheduled as per instruction of your physician. Refills should be requested at the time of your visit.

## 2024-12-26 NOTE — H&P (VIEW-ONLY)
Patient:  Mario Pringle  YOB: 1950  MRN: [id number]       HPI:       Mario Pringle is a 74 y.o. male who returns today for cardiac follow-up.   He has a history of atherosclerotic heart disease with remote STEMI in November 2006. He underwent angioplasty and stenting of the RCA at that time. An echocardiogram in 2006 showed inferolateral wall hypokinesis and an estimated LV ejection fraction of 45%. Aortic valve appeared to be bicuspid. A stress myocardial perfusion study showed mild inferior wall myocardial ischemia versus shifting diaphragmatic attenuation artifact. Calculated LV ejection fraction 44%. He has a history of hypertension, hyperlipidemia, and carotid artery disease. He has been followed on a regular basis by Dr. Berry Childress. He is maintained on aspirin, atorvastatin, and Toprol-XL.     At the time of his initial visit with me on September 12, 2023 he was doing well.  He noted that he typically takes a brisk 5 to 6 mile walk daily.  He was scheduled for a repeat carotid ultrasound as well as an echocardiogram to reassess LV ejection fraction and reported bicuspid aortic valve.  The echocardiogram showed an estimated LV ejection fraction of 30% with severe hypokinesis of the inferior and inferolateral walls.  The distal anterior wall was hypokinetic.  Right ventricular chamber size and function normal.  Valvular structure and function were normal.  Carotid ultrasound October 5, 2023 showed 50 to 69% stenosis in the right proximal internal carotid artery and greater than 50% stenosis of the right external carotid artery.  There was 50 to 69% stenosis of the left internal carotid artery.  A myocardial perfusion study on December 4, 2023 showed moderate inferolateral myocardial infarction extending from apex to base.  No evidence of myocardial ischemia by perfusion imaging.  There was severe LV dysfunction and a calculated LV ejection fraction of 29%.  Moderate to high risk study  though no significant ischemia identified.     It appeared that he likely had an extensive inferior myocardial infarction sometime ago.  No ischemia was identified on the stress test and he was not having any active anginal or CHF symptoms.  He was continued on Toprol-XL.  He was started on Entresto 24/26 mg twice daily and Jardiance 10 mg daily.  He had a LifeVest placed.  Entresto was subsequently increased to 49/51 mg twice daily.  Cardiac catheterization by Dr. Nieves June 6, 2024 showed normal left main trunk, patent LAD stents, 70% stenosis of the distal LAD, patent circumflex stents with mild luminal irregularities, 80% stenosis of the mid RCA.  He underwent angioplasty and stenting of the RCA.  Estimated LV ejection fraction was 25%.       He was seen by Rosalva Haynes CNP on July 9, 2024.   He was doing well but noted some low blood pressures after taking his morning medications.  He noted some associated lightheadedness.  His blood pressure in the office was 82/50.  Toprol-XL was decreased to 25 mg daily and Entresto was decreased to 24/26 mg twice daily.   A cardiac MRI July 23, 2024 showed a calculated LV ejection fraction of 34%.  There was a nontransmural subendocardial myocardial infarction involving the basal inferior wall and basal inferior septal segment.  RV ejection fraction 63%.    Limited echo November 25, 2024 showed an estimated LV ejection fraction 30 to 35%.    He was seen by Dr. Hutton December 2, 2024 and his scheduled to undergo dual-chamber ICD implantation January 7, 2025.  He generally has been doing well since his last visit with me. 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  He remains very active without limitations.  He currently is wearing a LifeVest mostly when he is home at night or when sleeping.  He denies any chest pain or shortness of breath. He denies any orthopnea, PND, or increasing peripheral edema. He denies any palpitations, lightheadedness,  near-syncope, or syncope. He denies any fever, chills, or cough. He denies any nausea, vomiting, or diaphoresis. He denies any hemoptysis, hematemesis, melena, or hematochezia. Lab studies December 23, 2024 showed a normal CBC with exception of platelets 149 and RBC 4.30.  Comprehensive metabolic profile was normal.  Cholesterol 119 with HDL 50, LDL 57, triglycerides 64.     We have been limited in uptitrating his medications secondary to persistent low blood pressure readings.  Blood pressure in the office today is 100/62 mmHg.  His he will continue on Toprol-XL 25 mg daily, Entresto 24/26 mg twice daily, Aldactone 12.5 mg daily, and Jardiance 10 mg daily.   His RCA stent was placed June 6, 2024.  He will hold Jardiance 3 days prior to the ICD implant.  Other details as noted below.    The above portion of this note was dictated by me using voice recognition software. I personally performed the services described in the documentation. The scribe entering the documentation below was in my presence. I affirm that the information is both accurate and complete.       Objective:     Vitals:    12/26/24 0808   BP: 100/62   Pulse: 57       Wt Readings from Last 4 Encounters:   12/26/24 71.6 kg (157 lb 12.8 oz)   12/02/24 71.9 kg (158 lb 9.6 oz)   11/11/24 71.3 kg (157 lb 3.2 oz)   08/08/24 70.4 kg (155 lb 3.2 oz)       Allergies:     No Known Allergies       Medications:     Current Outpatient Medications   Medication Instructions    aspirin 81 mg EC tablet 1 tablet, Daily    atorvastatin (LIPITOR) 80 mg, oral, Daily    clopidogrel (PLAVIX) 75 mg, oral, Daily    empagliflozin (JARDIANCE) 10 mg, oral, Daily    ezetimibe (ZETIA) 10 mg, oral, Daily    fish oil concentrate (Omega-3) 120-180 mg capsule 1 capsule, Daily    metoprolol succinate XL (TOPROL-XL) 25 mg, oral, Daily    multivitamin with minerals tablet 1 tablet, Daily    nitroglycerin (NITROSTAT) 0.4 mg, sublingual, Every 5 min PRN, May repeat every 5 minutes for up  to 3 doses.    sacubitriL-valsartan (Entresto) 24-26 mg tablet 1 TABLET TWICE A DAY    spironolactone (ALDACTONE) 12.5 mg, oral, Daily       Physical Examination:   GENERAL:  Well developed, well nourished, in no acute distress.  CHEST:  Symmetric and nontender.  NEURO/PSYCH:  Alert and oriented times three with approppriate behavior and responses.  NECK:  Supple, no JVD, no bruit.  LUNGS:  Clear to auscultation bilaterally, normal respiratory effort.  HEART:  Rate and rhythm regular with no evident murmur, no gallop appreciated.        There are no rubs, clicks or heaves.  EXTREMITIES:  Warm with good color, no clubbing or cyanosis.  There is no edema noted.  PERIPHERAL VASCULAR:  Pulses present and equally palpable; 2+ throughout.      Lab:     CBC:   Lab Results   Component Value Date    WBC 6.9 12/23/2024    RBC 4.30 (L) 12/23/2024    HGB 14.7 12/23/2024    HCT 43.4 12/23/2024     (L) 12/23/2024        CMP:    Lab Results   Component Value Date     12/23/2024    K 4.8 12/23/2024     12/23/2024    CO2 28 12/23/2024    BUN 18 12/23/2024    CREATININE 1.13 12/23/2024    GLUCOSE 93 12/23/2024    CALCIUM 9.5 12/23/2024       Lipid Profile:    Lab Results   Component Value Date    TRIG 64 12/23/2024    HDL 49.7 12/23/2024    LDLCALC 57 12/23/2024    LDLDIRECT 85 04/26/2022       BMP:  Lab Results   Component Value Date     12/23/2024     07/11/2024     06/03/2024    K 4.8 12/23/2024    K 4.2 07/11/2024    K 4.3 06/03/2024     12/23/2024     07/11/2024     06/03/2024    CO2 28 12/23/2024    CO2 27 07/11/2024    CO2 29 06/03/2024    BUN 18 12/23/2024    BUN 21 07/11/2024    BUN 19 06/03/2024    CREATININE 1.13 12/23/2024    CREATININE 1.16 07/11/2024    CREATININE 1.08 06/03/2024       CBC:  Lab Results   Component Value Date    WBC 6.9 12/23/2024    WBC 7.4 05/13/2024    WBC 6.4 04/05/2023    WBC 9.3 11/11/2022    WBC 7.4 11/04/2022    RBC 4.30 (L) 12/23/2024     RBC 4.23 (L) 05/13/2024    RBC 4.36 (L) 04/05/2023    RBC 3.61 (L) 11/11/2022    RBC 4.25 (L) 11/04/2022    HGB 14.7 12/23/2024    HGB 14.5 05/13/2024    HGB 14.6 04/05/2023    HGB 12.6 (L) 11/11/2022    HGB 14.6 11/04/2022    HCT 43.4 12/23/2024    HCT 42.5 05/13/2024    HCT 43.0 04/05/2023    HCT 36.0 (L) 11/11/2022    HCT 42.5 11/04/2022     (H) 12/23/2024     (H) 05/13/2024    MCV 99 04/05/2023     11/11/2022     11/04/2022    MCH 34.2 (H) 12/23/2024    MCH 34.3 (H) 05/13/2024    MCHC 33.9 12/23/2024    MCHC 34.1 05/13/2024    MCHC 34.0 04/05/2023    MCHC 35.0 11/11/2022    MCHC 34.4 11/04/2022    RDW 12.8 12/23/2024    RDW 13.0 05/13/2024    RDW 12.7 04/05/2023    RDW 12.5 11/11/2022    RDW 12.7 11/04/2022     (L) 12/23/2024     05/13/2024     04/05/2023     (L) 11/11/2022     11/04/2022       Hepatic Function Panel:    Lab Results   Component Value Date    ALKPHOS 67 12/23/2024    ALT 27 12/23/2024    AST 29 12/23/2024    PROT 6.7 12/23/2024    BILITOT 0.8 12/23/2024       Magnesium:    Lab Results   Component Value Date    MG 1.40 (L) 11/11/2022       TSH:    Lab Results   Component Value Date    TSH 1.37 05/13/2024     PT/INR:    Lab Results   Component Value Date    PROTIME 11.7 12/23/2024    INR 1.0 12/23/2024       Diagnostic Studies:     MR cardiac morphology and function w and wo IV contrast  Result Date: 7/24/2024  Interpreted By:  Ken Espinoza       1. Normal left ventricular cavity size with severely depressed systolic function. Ejection fraction 34%. 2. Delayed enhancement imaging reveals non transmural subendocardial myocardial infarction of the basal inferior wall, and basal inferior septum. Transmural myocardial infarction of the mid inferior wall, and mid inferior septum. Non transmural subendocardial myocardial infarction of the basal/mid inferolateral wall. Non transmural subendocardial myocardial infarction of the distal  inferior wall. 3. Asymmetric air septal left ventricular hypertrophy. Basal anterior septum 1.2 cm. 4. Normal right ventricular cavity size with preserved systolic function. RV EF 63%.     MACRO: None   Signed by: Ken Espinoza 7/24/2024 5:54 PM Dictation workstation:   [id number]      Problem List:     Patient Active Problem List   Diagnosis    Atherosclerosis of native coronary artery of native heart without angina pectoris    Dyslipidemia    Hypertension    Prostate cancer (Multi)    Routine general medical examination at health care facility    Cardiomyopathy, ischemic    Hx of percutaneous transluminal coronary angioplasty    Old inferior wall myocardial infarction    Chronic systolic heart failure    Abnormal EKG    BMI 26.0-26.9,adult    Never smoked tobacco    Encounter for medication adjustment    Cancer (Multi)    Cardiomyopathy    Ischemic cardiomyopathy    Chronic systolic (congestive) heart failure       Asessment:     Problem List Items Addressed This Visit             ICD-10-CM    Atherosclerosis of native coronary artery of native heart without angina pectoris I25.10    Relevant Medications    ezetimibe (Zetia) 10 mg tablet    metoprolol succinate XL (Toprol-XL) 25 mg 24 hr tablet    sacubitriL-valsartan (Entresto) 24-26 mg tablet    Other Relevant Orders    [facility]    Comprehensive Metabolic Panel    Lipid Panel    Follow Up In Cardiology    Dyslipidemia E78.5    Relevant Medications    ezetimibe (Zetia) 10 mg tablet    Other Relevant Orders    Follow Up In Cardiology    Hypertension I10    Relevant Medications    spironolactone (Aldactone) 25 mg tablet    Other Relevant Orders    Follow Up In Cardiology    CBC    Comprehensive Metabolic Panel    Lipid Panel    Follow Up In Cardiology    Cardiomyopathy, ischemic I25.5    Relevant Medications    empagliflozin (Jardiance) 10 mg    metoprolol succinate XL (Toprol-XL) 25 mg 24 hr tablet    sacubitriL-valsartan (Entresto) 24-26 mg tablet    Other Relevant  Orders    Follow Up In Cardiology    Hx of percutaneous transluminal coronary angioplasty Z98.61    Relevant Orders    Follow Up In Cardiology    Old inferior wall myocardial infarction I25.2    Relevant Medications    metoprolol succinate XL (Toprol-XL) 25 mg 24 hr tablet    sacubitriL-valsartan (Entresto) 24-26 mg tablet    Other Relevant Orders    Follow Up In Cardiology    Chronic systolic heart failure I50.22    Relevant Medications    empagliflozin (Jardiance) 10 mg    metoprolol succinate XL (Toprol-XL) 25 mg 24 hr tablet    sacubitriL-valsartan (Entresto) 24-26 mg tablet    Other Relevant Orders    Follow Up In Cardiology    BMI 26.0-26.9,adult Z68.26    Relevant Orders    Follow Up In Cardiology    Never smoked tobacco Z78.9    Relevant Orders    Follow Up In Cardiology    Encounter for medication adjustment Z51.89    Relevant Orders    Follow Up In Cardiology

## 2024-12-26 NOTE — TELEPHONE ENCOUNTER
----- Message from Aaron Hutton sent at 12/26/2024  9:37 AM EST -----  Regarding: RE:  Thanks Blair  I don’t hold plavix for device implantation   I am ok to continue with that   Only jardiance or if he is using any NOAC  Office will contact him to confirm this  Thanks     Aaron  ----- Message -----  From: Juan Sharma MD  Sent: 12/26/2024   8:55 AM EST  To: Aaron Hutton MD    Good morning Aaron.  I saw Mr. Pringle in follow-up today.  He is scheduled to have his ICD implant on January 7, 2024.  He was advised to hold his Jardiance but was questioning whether or not to hold Plavix.  He had an RCA stent implant in June of this year.  Just wanted to check to see what your preference was regarding Plavix.  Thanks.

## 2025-01-07 ENCOUNTER — HOSPITAL ENCOUNTER (OUTPATIENT)
Facility: HOSPITAL | Age: 75
Setting detail: OUTPATIENT SURGERY
Discharge: HOME | End: 2025-01-07
Attending: INTERNAL MEDICINE | Admitting: INTERNAL MEDICINE
Payer: MEDICARE

## 2025-01-07 ENCOUNTER — APPOINTMENT (OUTPATIENT)
Dept: CARDIOLOGY | Facility: HOSPITAL | Age: 75
End: 2025-01-07
Payer: MEDICARE

## 2025-01-07 ENCOUNTER — APPOINTMENT (OUTPATIENT)
Dept: RADIOLOGY | Facility: HOSPITAL | Age: 75
End: 2025-01-07
Payer: MEDICARE

## 2025-01-07 ENCOUNTER — ANESTHESIA (OUTPATIENT)
Dept: CARDIOLOGY | Facility: HOSPITAL | Age: 75
End: 2025-01-07
Payer: MEDICARE

## 2025-01-07 ENCOUNTER — HOSPITAL ENCOUNTER (OUTPATIENT)
Dept: CARDIOLOGY | Facility: HOSPITAL | Age: 75
Setting detail: OUTPATIENT SURGERY
Discharge: HOME | End: 2025-01-07
Payer: MEDICARE

## 2025-01-07 ENCOUNTER — ANESTHESIA EVENT (OUTPATIENT)
Dept: CARDIOLOGY | Facility: HOSPITAL | Age: 75
End: 2025-01-07
Payer: MEDICARE

## 2025-01-07 VITALS
WEIGHT: 156.97 LBS | HEIGHT: 69 IN | DIASTOLIC BLOOD PRESSURE: 56 MMHG | OXYGEN SATURATION: 98 % | RESPIRATION RATE: 18 BRPM | BODY MASS INDEX: 23.25 KG/M2 | SYSTOLIC BLOOD PRESSURE: 101 MMHG | TEMPERATURE: 97.9 F | HEART RATE: 82 BPM

## 2025-01-07 DIAGNOSIS — G89.18 POST-OP PAIN: ICD-10-CM

## 2025-01-07 DIAGNOSIS — I42.9 CARDIOMYOPATHY, UNSPECIFIED TYPE (MULTI): ICD-10-CM

## 2025-01-07 DIAGNOSIS — I50.22 CHRONIC SYSTOLIC (CONGESTIVE) HEART FAILURE: ICD-10-CM

## 2025-01-07 DIAGNOSIS — I42.9 CARDIOMYOPATHY, UNSPECIFIED TYPE (MULTI): Primary | ICD-10-CM

## 2025-01-07 DIAGNOSIS — I25.5 ISCHEMIC CARDIOMYOPATHY: ICD-10-CM

## 2025-01-07 DIAGNOSIS — Z95.810 ICD (IMPLANTABLE CARDIOVERTER-DEFIBRILLATOR) IN PLACE: ICD-10-CM

## 2025-01-07 DIAGNOSIS — I42.9 CARDIOMYOPATHY: ICD-10-CM

## 2025-01-07 DIAGNOSIS — Z95.810 PRESENCE OF AUTOMATIC CARDIOVERTER/DEFIBRILLATOR (AICD): Primary | ICD-10-CM

## 2025-01-07 LAB
ANION GAP SERPL CALC-SCNC: 15 MMOL/L (ref 10–20)
APTT PPP: 30 SECONDS (ref 27–38)
ATRIAL RATE: 86 BPM
BUN SERPL-MCNC: 21 MG/DL (ref 6–23)
CALCIUM SERPL-MCNC: 9.3 MG/DL (ref 8.6–10.3)
CHLORIDE SERPL-SCNC: 107 MMOL/L (ref 98–107)
CO2 SERPL-SCNC: 24 MMOL/L (ref 21–32)
CREAT SERPL-MCNC: 1.12 MG/DL (ref 0.5–1.3)
EGFRCR SERPLBLD CKD-EPI 2021: 68 ML/MIN/1.73M*2
ERYTHROCYTE [DISTWIDTH] IN BLOOD BY AUTOMATED COUNT: 12.1 % (ref 11.5–14.5)
GLUCOSE SERPL-MCNC: 92 MG/DL (ref 74–99)
HCT VFR BLD AUTO: 40.7 % (ref 41–52)
HGB BLD-MCNC: 14.4 G/DL (ref 13.5–17.5)
INR PPP: 1.1 (ref 0.9–1.1)
MCH RBC QN AUTO: 34.4 PG (ref 26–34)
MCHC RBC AUTO-ENTMCNC: 35.4 G/DL (ref 32–36)
MCV RBC AUTO: 97 FL (ref 80–100)
NRBC BLD-RTO: 0 /100 WBCS (ref 0–0)
P AXIS: 63 DEGREES
P OFFSET: 185 MS
P ONSET: 131 MS
PLATELET # BLD AUTO: 258 X10*3/UL (ref 150–450)
POTASSIUM SERPL-SCNC: 4.7 MMOL/L (ref 3.5–5.3)
PR INTERVAL: 174 MS
PROTHROMBIN TIME: 11.9 SECONDS (ref 9.8–12.8)
Q ONSET: 218 MS
QRS COUNT: 14 BEATS
QRS DURATION: 92 MS
QT INTERVAL: 360 MS
QTC CALCULATION(BAZETT): 430 MS
QTC FREDERICIA: 406 MS
R AXIS: 40 DEGREES
RBC # BLD AUTO: 4.18 X10*6/UL (ref 4.5–5.9)
SODIUM SERPL-SCNC: 141 MMOL/L (ref 136–145)
T AXIS: 3 DEGREES
T OFFSET: 398 MS
VENTRICULAR RATE: 86 BPM
WBC # BLD AUTO: 7.4 X10*3/UL (ref 4.4–11.3)

## 2025-01-07 PROCEDURE — 7100000009 HC PHASE TWO TIME - INITIAL BASE CHARGE: Performed by: INTERNAL MEDICINE

## 2025-01-07 PROCEDURE — 93641 EP EVL 1/2CHMB PAC CVDFB TST: CPT | Performed by: INTERNAL MEDICINE

## 2025-01-07 PROCEDURE — 2750000001 HC OR 275 NO HCPCS: Performed by: INTERNAL MEDICINE

## 2025-01-07 PROCEDURE — 7100000010 HC PHASE TWO TIME - EACH INCREMENTAL 1 MINUTE: Performed by: INTERNAL MEDICINE

## 2025-01-07 PROCEDURE — C1777 LEAD, AICD, ENDO SINGLE COIL: HCPCS | Performed by: INTERNAL MEDICINE

## 2025-01-07 PROCEDURE — 2500000004 HC RX 250 GENERAL PHARMACY W/ HCPCS (ALT 636 FOR OP/ED): Performed by: NURSE ANESTHETIST, CERTIFIED REGISTERED

## 2025-01-07 PROCEDURE — 85610 PROTHROMBIN TIME: CPT | Performed by: NURSE PRACTITIONER

## 2025-01-07 PROCEDURE — 3700000001 HC GENERAL ANESTHESIA TIME - INITIAL BASE CHARGE: Performed by: INTERNAL MEDICINE

## 2025-01-07 PROCEDURE — 99152 MOD SED SAME PHYS/QHP 5/>YRS: CPT | Performed by: INTERNAL MEDICINE

## 2025-01-07 PROCEDURE — 2780000003 HC OR 278 NO HCPCS: Performed by: INTERNAL MEDICINE

## 2025-01-07 PROCEDURE — 3700000013 HC SEDATION LEVEL 5+ TIME - EACH ADDITIONAL 15 MINUTES: Performed by: INTERNAL MEDICINE

## 2025-01-07 PROCEDURE — 3700000002 HC GENERAL ANESTHESIA TIME - EACH INCREMENTAL 1 MINUTE: Performed by: INTERNAL MEDICINE

## 2025-01-07 PROCEDURE — 93010 ELECTROCARDIOGRAM REPORT: CPT | Performed by: INTERNAL MEDICINE

## 2025-01-07 PROCEDURE — 85027 COMPLETE CBC AUTOMATED: CPT | Performed by: NURSE PRACTITIONER

## 2025-01-07 PROCEDURE — 93005 ELECTROCARDIOGRAM TRACING: CPT

## 2025-01-07 PROCEDURE — 33249 INSJ/RPLCMT DEFIB W/LEAD(S): CPT | Performed by: INTERNAL MEDICINE

## 2025-01-07 PROCEDURE — 36415 COLL VENOUS BLD VENIPUNCTURE: CPT | Performed by: NURSE PRACTITIONER

## 2025-01-07 PROCEDURE — 80048 BASIC METABOLIC PNL TOTAL CA: CPT | Performed by: NURSE PRACTITIONER

## 2025-01-07 PROCEDURE — 71045 X-RAY EXAM CHEST 1 VIEW: CPT | Performed by: RADIOLOGY

## 2025-01-07 PROCEDURE — C1892 INTRO/SHEATH,FIXED,PEEL-AWAY: HCPCS | Performed by: INTERNAL MEDICINE

## 2025-01-07 PROCEDURE — 2720000007 HC OR 272 NO HCPCS: Performed by: INTERNAL MEDICINE

## 2025-01-07 PROCEDURE — C1721 AICD, DUAL CHAMBER: HCPCS | Performed by: INTERNAL MEDICINE

## 2025-01-07 PROCEDURE — 99153 MOD SED SAME PHYS/QHP EA: CPT | Performed by: INTERNAL MEDICINE

## 2025-01-07 PROCEDURE — 3700000012 HC SEDATION LEVEL 5+ TIME - INITIAL 15 MINUTES 5/> YEARS: Performed by: INTERNAL MEDICINE

## 2025-01-07 PROCEDURE — 71045 X-RAY EXAM CHEST 1 VIEW: CPT

## 2025-01-07 PROCEDURE — 93283 PRGRMG EVAL IMPLANTABLE DFB: CPT

## 2025-01-07 PROCEDURE — 2500000005 HC RX 250 GENERAL PHARMACY W/O HCPCS: Performed by: NURSE PRACTITIONER

## 2025-01-07 PROCEDURE — 2500000004 HC RX 250 GENERAL PHARMACY W/ HCPCS (ALT 636 FOR OP/ED): Performed by: INTERNAL MEDICINE

## 2025-01-07 PROCEDURE — 2500000004 HC RX 250 GENERAL PHARMACY W/ HCPCS (ALT 636 FOR OP/ED): Mod: JZ | Performed by: NURSE PRACTITIONER

## 2025-01-07 PROCEDURE — 7100000001 HC RECOVERY ROOM TIME - INITIAL BASE CHARGE: Performed by: INTERNAL MEDICINE

## 2025-01-07 PROCEDURE — 7100000002 HC RECOVERY ROOM TIME - EACH INCREMENTAL 1 MINUTE: Performed by: INTERNAL MEDICINE

## 2025-01-07 PROCEDURE — C1898 LEAD, PMKR, OTHER THAN TRANS: HCPCS | Performed by: INTERNAL MEDICINE

## 2025-01-07 DEVICE — LEAD 6935M62 DF4 ACTIVE SC US EN
Type: IMPLANTABLE DEVICE | Site: CHEST | Status: FUNCTIONAL
Brand: SPRINT QUATTRO SECURE S®

## 2025-01-07 DEVICE — LEAD 5076-52 CAPSUREFIX NOVUS US EN
Type: IMPLANTABLE DEVICE | Site: CHEST | Status: FUNCTIONAL
Brand: CAPSUREFIX® NOVUS

## 2025-01-07 DEVICE — ICD DDPA2D4 COBALT XT DR MRI DF4 USA
Type: IMPLANTABLE DEVICE | Site: CHEST | Status: FUNCTIONAL
Brand: COBALT™ XT DR MRI SURESCAN™

## 2025-01-07 RX ORDER — ACETAMINOPHEN 325 MG/1
650 TABLET ORAL EVERY 4 HOURS PRN
Start: 2025-01-07

## 2025-01-07 RX ORDER — ONDANSETRON HYDROCHLORIDE 2 MG/ML
4 INJECTION, SOLUTION INTRAVENOUS EVERY 8 HOURS PRN
Status: DISCONTINUED | OUTPATIENT
Start: 2025-01-07 | End: 2025-01-07 | Stop reason: HOSPADM

## 2025-01-07 RX ORDER — CEFAZOLIN 1 G/1
INJECTION, POWDER, FOR SOLUTION INTRAVENOUS AS NEEDED
Status: DISCONTINUED | OUTPATIENT
Start: 2025-01-07 | End: 2025-01-07 | Stop reason: HOSPADM

## 2025-01-07 RX ORDER — MUPIROCIN 20 MG/G
1 OINTMENT TOPICAL ONCE
Status: COMPLETED | OUTPATIENT
Start: 2025-01-07 | End: 2025-01-07

## 2025-01-07 RX ORDER — ACETAMINOPHEN 650 MG/1
650 SUPPOSITORY RECTAL EVERY 4 HOURS PRN
Status: DISCONTINUED | OUTPATIENT
Start: 2025-01-07 | End: 2025-01-07 | Stop reason: HOSPADM

## 2025-01-07 RX ORDER — ACETAMINOPHEN 325 MG/1
650 TABLET ORAL EVERY 4 HOURS PRN
Status: DISCONTINUED | OUTPATIENT
Start: 2025-01-07 | End: 2025-01-07 | Stop reason: HOSPADM

## 2025-01-07 RX ORDER — ACETAMINOPHEN 160 MG/5ML
650 SOLUTION ORAL EVERY 4 HOURS PRN
Status: DISCONTINUED | OUTPATIENT
Start: 2025-01-07 | End: 2025-01-07 | Stop reason: HOSPADM

## 2025-01-07 RX ORDER — LIDOCAINE HYDROCHLORIDE 10 MG/ML
INJECTION, SOLUTION EPIDURAL; INFILTRATION; INTRACAUDAL; PERINEURAL AS NEEDED
Status: DISCONTINUED | OUTPATIENT
Start: 2025-01-07 | End: 2025-01-07 | Stop reason: HOSPADM

## 2025-01-07 RX ORDER — ONDANSETRON 4 MG/1
4 TABLET, FILM COATED ORAL EVERY 8 HOURS PRN
Status: DISCONTINUED | OUTPATIENT
Start: 2025-01-07 | End: 2025-01-07 | Stop reason: HOSPADM

## 2025-01-07 RX ORDER — MIDAZOLAM HYDROCHLORIDE 1 MG/ML
INJECTION, SOLUTION INTRAMUSCULAR; INTRAVENOUS AS NEEDED
Status: DISCONTINUED | OUTPATIENT
Start: 2025-01-07 | End: 2025-01-07 | Stop reason: HOSPADM

## 2025-01-07 RX ORDER — TRAMADOL HYDROCHLORIDE 50 MG/1
50 TABLET ORAL EVERY 6 HOURS PRN
Status: DISCONTINUED | OUTPATIENT
Start: 2025-01-07 | End: 2025-01-07 | Stop reason: HOSPADM

## 2025-01-07 RX ORDER — PROPOFOL 10 MG/ML
INJECTION, EMULSION INTRAVENOUS AS NEEDED
Status: DISCONTINUED | OUTPATIENT
Start: 2025-01-07 | End: 2025-01-07

## 2025-01-07 RX ORDER — CEFAZOLIN SODIUM 1 G/50ML
1 SOLUTION INTRAVENOUS ONCE
Status: COMPLETED | OUTPATIENT
Start: 2025-01-07 | End: 2025-01-07

## 2025-01-07 RX ORDER — FENTANYL CITRATE 50 UG/ML
INJECTION, SOLUTION INTRAMUSCULAR; INTRAVENOUS AS NEEDED
Status: DISCONTINUED | OUTPATIENT
Start: 2025-01-07 | End: 2025-01-07 | Stop reason: HOSPADM

## 2025-01-07 RX ORDER — TRAMADOL HYDROCHLORIDE 50 MG/1
50 TABLET ORAL EVERY 6 HOURS PRN
Qty: 10 TABLET | Refills: 0 | Status: SHIPPED | OUTPATIENT
Start: 2025-01-07

## 2025-01-07 RX ORDER — CHLORHEXIDINE GLUCONATE 40 MG/ML
SOLUTION TOPICAL ONCE
Status: COMPLETED | OUTPATIENT
Start: 2025-01-07 | End: 2025-01-07

## 2025-01-07 RX ADMIN — Medication: at 07:00

## 2025-01-07 RX ADMIN — MUPIROCIN 1 APPLICATION: 20 OINTMENT TOPICAL at 07:03

## 2025-01-07 RX ADMIN — CEFAZOLIN SODIUM 1 G: 1 INJECTION, SOLUTION INTRAVENOUS at 08:57

## 2025-01-07 RX ADMIN — PROPOFOL 50 MG: 10 INJECTION, EMULSION INTRAVENOUS at 09:58

## 2025-01-07 RX ADMIN — PROPOFOL 3 MG: 10 INJECTION, EMULSION INTRAVENOUS at 10:00

## 2025-01-07 SDOH — HEALTH STABILITY: MENTAL HEALTH: CURRENT SMOKER: 0

## 2025-01-07 ASSESSMENT — PAIN SCALES - GENERAL
PAINLEVEL_OUTOF10: 0 - NO PAIN

## 2025-01-07 ASSESSMENT — COLUMBIA-SUICIDE SEVERITY RATING SCALE - C-SSRS
2. HAVE YOU ACTUALLY HAD ANY THOUGHTS OF KILLING YOURSELF?: NO
1. IN THE PAST MONTH, HAVE YOU WISHED YOU WERE DEAD OR WISHED YOU COULD GO TO SLEEP AND NOT WAKE UP?: NO
6. HAVE YOU EVER DONE ANYTHING, STARTED TO DO ANYTHING, OR PREPARED TO DO ANYTHING TO END YOUR LIFE?: NO

## 2025-01-07 ASSESSMENT — PAIN - FUNCTIONAL ASSESSMENT
PAIN_FUNCTIONAL_ASSESSMENT: 0-10

## 2025-01-07 NOTE — SIGNIFICANT EVENT
Discharge instructions reviewed with patient including medications, wound care, left arm restrictions and follow up, pt verbalized understanding

## 2025-01-07 NOTE — Clinical Note
Patient ID band present and verified. Patient contact is in waiting room. Contact(s) present: son. Contact name: [email or web address]

## 2025-01-07 NOTE — Clinical Note
The DIFIBULATOR, ICD COBALT XT DR MRI IS1 DF4 - [id number] device was inserted. The leads were placed into the connector and visually verified to be in correct position. Injury current obtained. [employer] did final testing of leads  AAIR-DDR

## 2025-01-07 NOTE — ANESTHESIA POSTPROCEDURE EVALUATION
Patient: Mario Pringle    Procedure Summary       Date: 01/07/25 Room / Location: [facility] / [facility]    Anesthesia Start: 0954 Anesthesia Stop: 1010    Procedure: ICD Dual Implant (Left: Chest) Diagnosis:       Cardiomyopathy, unspecified type (Multi)      Ischemic cardiomyopathy      Chronic systolic (congestive) heart failure    Providers: Aaron Hutton MD Responsible Provider: Saturnino Ray MD    Anesthesia Type: MAC ASA Status: 3            Anesthesia Type: MAC    Vitals Value Taken Time   BP 84/43 01/07/25 1010   Temp 36.5 01/07/25 1010   Pulse 72 01/07/25 1010   Resp 12 01/07/25 1010   SpO2 97 01/07/25 1010       Anesthesia Post Evaluation    Patient location during evaluation: bedside  Patient participation: complete - patient participated  Level of consciousness: awake  Pain management: adequate  Airway patency: patent  Cardiovascular status: stable  Respiratory status: acceptable  Hydration status: stable  Postoperative Nausea and Vomiting: none        No notable events documented.

## 2025-01-07 NOTE — DISCHARGE INSTRUCTIONS
Home going instructions after a Pacemaker/ Defibrillator Implant    After a procedure using sedation  You should return home and rest for the remainder of the day and evening.   It is recommended a responsible adult be with you for the first 24 hours after the procedure.  Do not make any legal decisions for 24 hours after your procedure.  Do not drink alcoholic beverages for 24 hours after your procedure.    Wound care  Leave the surgical dressing in place for 7 days post implant  The dressing will be removed at the 1 week follow up appointment.    Please keep your incision dry, the dressing is water resistant.    You may shower avoid water directly hitting the dressing.     Inspect your incisional site/ dressing each day  Apply ice to the site 3-4 times per day in 20 minute intervals for at least 2 days after surgery  It is normal for the area around the incision to be tender for a few weeks following surgery.     Pain relievers such as Tylenol or Motrin are usually sufficient for pain relief.    You may be prescribed Tramadol in addition.  Take as prescribed alternating with Tylenol or Motrin.    Activity  Avoid driving for 1 week.  If you have had passing out spells or previously restricted from driving, discuss driving restrictions with your doctor.  For the first 4 to 6 weeks after implant avoid excessive/repetitive arm movement on the side of your new implant.    Do not raise, push, pull, or stretch your arm above shoulder level.    Do not  items that weigh greater than 10 lbs with the device arm.    Wear your binder/sling for the first 48 hours then at night to remind you not to move the arm over your head and during the day as needed.    Report to your provider   Increased redness, swelling, drainage or gaping of your incisional site.  Increased pain at site unrelieved by pain medication.  Fever or chills prior to the 1 week appointment.  Bright red bleeding from the incisional site or complete  saturation of the dressing.  Dizziness, lightheadedness, passing out, or defibrillator shocks.    Remote monitoring/ Device ID card  You have been instructed by the device company representative regarding remote home monitoring.   There are multiple types of home monitoring units, please follow the instructions given  If you have questions please contact the device clinic for further instruction  After implant you will receive a temporary card.    Permanent card will come in the mail in the next few weeks.  It is important that you carry your pacemaker ID card with you at all times.    Inform all doctors and healthcare providers that you have a pacemaker.      Electromagnetic Interference:    Microwave ovens are safe to use.    Please inform any physicians of your pacemaker implant prior to MRI for appropriate scheduling.    You should be prepared to show your pacemaker identification card to the security guards at metal detectors.    Hand wand detector should be kept away from the pacemaker.    Read the patient booklet for more information.      Follow up appointments  Incision (wound) check in 1 week.  Appointment for Chest xray, device check, and provider in 3 months.  These appointments will be scheduled and appear on your After Visit Summary.      supple

## 2025-01-07 NOTE — SIGNIFICANT EVENT
Pt back from [facility] awake and alert, left upper chest site stable, no bleeding or hematoma noted, ice pack to site, EKG and chest x-ray complete, family at bedside

## 2025-01-07 NOTE — ANESTHESIA PREPROCEDURE EVALUATION
Patient: Mario Pringle    Procedure Information       Date/Time: 01/07/25 5334    Procedure: ICD Dual Implant - hold Jardiance 7 days prior to    Location: [facility] / [facility]    Providers: Aaron Hutton MD            Relevant Problems   Cardiac   (+) Abnormal EKG   (+) Atherosclerosis of native coronary artery of native heart without angina pectoris   (+) Hypertension   (+) Old inferior wall myocardial infarction      /Renal   (+) Prostate cancer (Multi)       Clinical information reviewed:   Tobacco  Allergies  Meds  Problems  Med Hx  Surg Hx   Fam Hx  Soc   Hx        NPO Detail:  No data recorded     Physical Exam    Airway  Mallampati: II  TM distance: >3 FB  Neck ROM: full     Cardiovascular   Rate: abnormal     Dental    Pulmonary - normal exam     Abdominal - normal exam             Anesthesia Plan    History of general anesthesia?: yes  History of complications of general anesthesia?: no    ASA 3     MAC     The patient is not a current smoker.  Patient did not smoke on day of procedure.    intravenous induction   Anesthetic plan and risks discussed with patient.    Plan discussed with CRNA and medical student.

## 2025-01-12 LAB
ATRIAL RATE: 83 BPM
P AXIS: 73 DEGREES
P OFFSET: 177 MS
P ONSET: 116 MS
PR INTERVAL: 192 MS
Q ONSET: 212 MS
QRS COUNT: 13 BEATS
QRS DURATION: 90 MS
QT INTERVAL: 368 MS
QTC CALCULATION(BAZETT): 432 MS
QTC FREDERICIA: 410 MS
R AXIS: 14 DEGREES
T AXIS: 51 DEGREES
T OFFSET: 396 MS
VENTRICULAR RATE: 83 BPM

## 2025-01-13 ENCOUNTER — APPOINTMENT (OUTPATIENT)
Dept: CARDIOLOGY | Facility: CLINIC | Age: 75
End: 2025-01-13
Payer: MEDICARE

## 2025-01-13 VITALS
HEART RATE: 97 BPM | BODY MASS INDEX: 23.49 KG/M2 | WEIGHT: 158.6 LBS | HEIGHT: 69 IN | DIASTOLIC BLOOD PRESSURE: 64 MMHG | SYSTOLIC BLOOD PRESSURE: 90 MMHG

## 2025-01-13 DIAGNOSIS — Z09 HOSPITAL DISCHARGE FOLLOW-UP: Primary | ICD-10-CM

## 2025-01-13 DIAGNOSIS — Z95.810 ICD (IMPLANTABLE CARDIOVERTER-DEFIBRILLATOR) IN PLACE: ICD-10-CM

## 2025-01-13 DIAGNOSIS — Z51.89 VISIT FOR WOUND CHECK: ICD-10-CM

## 2025-01-13 PROCEDURE — 1160F RVW MEDS BY RX/DR IN RCRD: CPT | Performed by: NURSE PRACTITIONER

## 2025-01-13 PROCEDURE — 3078F DIAST BP <80 MM HG: CPT | Performed by: NURSE PRACTITIONER

## 2025-01-13 PROCEDURE — 3074F SYST BP LT 130 MM HG: CPT | Performed by: NURSE PRACTITIONER

## 2025-01-13 PROCEDURE — 1036F TOBACCO NON-USER: CPT | Performed by: NURSE PRACTITIONER

## 2025-01-13 PROCEDURE — 1159F MED LIST DOCD IN RCRD: CPT | Performed by: NURSE PRACTITIONER

## 2025-01-13 PROCEDURE — 1157F ADVNC CARE PLAN IN RCRD: CPT | Performed by: NURSE PRACTITIONER

## 2025-01-13 PROCEDURE — 1123F ACP DISCUSS/DSCN MKR DOCD: CPT | Performed by: NURSE PRACTITIONER

## 2025-01-13 PROCEDURE — 99024 POSTOP FOLLOW-UP VISIT: CPT | Performed by: NURSE PRACTITIONER

## 2025-01-13 NOTE — PROGRESS NOTES
Mario Pringle is a 74 y.o. male that presents to the office today with his son for hospital follow up.  He follows with his  primary cardiologist Dr. Sharma and Dr. Hutton  and was added to my schedule today.      He underwent placement of dual chamber ICD with Dr. Hutton at [facility] 1/7/2025 without complications.    Left upper chest dressing removed.  Incision well approximated, no redness, drainage, ecchymosis or hematoma noted.        Patient Active Problem List   Diagnosis    Atherosclerosis of native coronary artery of native heart without angina pectoris    Dyslipidemia    Hypertension    Prostate cancer (Multi)    Routine general medical examination at health care facility    Cardiomyopathy, ischemic    Hx of percutaneous transluminal coronary angioplasty    Old inferior wall myocardial infarction    Chronic systolic heart failure    Abnormal EKG    BMI 26.0-26.9,adult    Never smoked tobacco    Encounter for medication adjustment    Cancer (Multi)    Cardiomyopathy    Ischemic cardiomyopathy    Chronic systolic (congestive) heart failure       Social History     Tobacco Use    Smoking status: Never    Smokeless tobacco: Never   Vaping Use    Vaping status: Never Used   Substance Use Topics    Alcohol use: Not Currently     Alcohol/week: 1.0 standard drink of alcohol     Types: 1 Glasses of wine per week     Comment: on occ    Drug use: Never       Past Medical History:   Diagnosis Date    Cancer (Multi)     Prostate    CHF (congestive heart failure)     Coronary artery disease     Hyperlipidemia     Hypertension     Ischemic cardiomyopathy     Right foot drop 02/17/2023         Current Outpatient Medications:     acetaminophen (Tylenol) 325 mg tablet, Take 2 tablets (650 mg) by mouth every 4 hours if needed for mild pain (1 - 3) or moderate pain (4 - 6)., Disp: , Rfl:     aspirin 81 mg EC tablet, Take 1 tablet (81 mg) by mouth once daily., Disp: , Rfl:     atorvastatin (Lipitor) 80 mg tablet, Take 1 tablet (80  mg) by mouth once daily., Disp: 90 tablet, Rfl: 2    clopidogrel (Plavix) 75 mg tablet, Take 1 tablet (75 mg) by mouth once daily for 365 doses. Do not fill before June 7, 2024., Disp: 30 tablet, Rfl: 11    empagliflozin (Jardiance) 10 mg, Take 1 tablet (10 mg) by mouth once daily., Disp: 90 tablet, Rfl: 1    ezetimibe (Zetia) 10 mg tablet, Take 1 tablet (10 mg) by mouth once daily., Disp: 90 tablet, Rfl: 1    fish oil concentrate (Omega-3) 120-180 mg capsule, Take 1 capsule (1 g) by mouth once daily., Disp: , Rfl:     metoprolol succinate XL (Toprol-XL) 25 mg 24 hr tablet, Take 1 tablet (25 mg) by mouth once daily., Disp: 90 tablet, Rfl: 1    multivitamin with minerals tablet, Take 1 tablet by mouth once daily., Disp: , Rfl:     nitroglycerin (Nitrostat) 0.4 mg SL tablet, Place 1 tablet (0.4 mg) under the tongue every 5 minutes if needed for chest pain. May repeat every 5 minutes for up to 3 doses., Disp: 25 tablet, Rfl: 3    sacubitriL-valsartan (Entresto) 24-26 mg tablet, 1 TABLET TWICE A DAY, Disp: 180 tablet, Rfl: 1    spironolactone (Aldactone) 25 mg tablet, Take 0.5 tablets (12.5 mg) by mouth once daily., Disp: 45 tablet, Rfl: 1    traMADol (Ultram) 50 mg tablet, Take 1 tablet (50 mg) by mouth every 6 hours if needed for moderate pain (4 - 6) or severe pain (7 - 10). If no relief from tylenol then alternate tylenol with Tramadol, Disp: 10 tablet, Rfl: 0    Patient has no known allergies.    Family History   Problem Relation Name Age of Onset    Heart disease Mother      Hypertension Other Family history        Past Surgical History:   Procedure Laterality Date    CARDIAC CATHETERIZATION N/A 6/6/2024    Procedure: Left Heart Cath, With LV;  Surgeon: Stevo Nieves MD;  Location: [facility];  Service: Cardiovascular;  Laterality: N/A;    CARDIAC CATHETERIZATION N/A 6/6/2024    Procedure: PCI KRISTIN Stent- Coronary;  Surgeon: Stevo Nieves MD;  Location: [facility];  Service: Cardiovascular;   "Laterality: N/A;    CARDIAC ELECTROPHYSIOLOGY PROCEDURE Left 1/7/2025    Procedure: ICD Dual Implant;  Surgeon: Aaron Hutton MD;  Location: [facility];  Service: Electrophysiology;  Laterality: Left;  hold Jardiance 7 days prior to    CORONARY ANGIOPLASTY      US ASPIRATION INJECTION INTERMEDIATE JOINT  03/16/2022    US ASPIRATION INJECTION INTERMEDIATE JOINT 3/16/2022 [facility]          Review of systems  Constitutional: No weight loss, fever, chills, weakness or fatigue  HEENT: No visual loss, blurred vision, double vision or yellow sclerae  Skin: No rash or itching  Cardiovascular: No chest pain, pressure or discomfort, No palpitations or edema.  Respiratory: No shortness of breath, cough or sputum  Gastrointestinal: No nausea, vomiting or diarrhea. No bloody or dark tarry stools.  Neurological: No headache, lightheadedness, dizziness, syncope.   Musculoskeletal: No muscle, back pain, joint pain or stiffness.  Hematologic: No anemia, bleeding or bruising.    BP 90/64 (BP Location: Right arm, Patient Position: Sitting)   Pulse 97   Ht 1.753 m (5' 9\")   Wt 71.9 kg (158 lb 9.6 oz)   BMI 23.42 kg/m²     Physical Exam  Constitutional: Well developed, awake/alert x 3, no distress.  Respiratory/Thorax: patent airways, CTAB, normal breath sounds with good expansion.  Cardiovascular: Regular rate and rhythm, no murmurs, normal S1 and S2,   Extremities: No cyanosis, edema.   Neurological: Alert and oriented x 3. Moves extremities spontaneous with purpose.  Psychological: Appropriate mood and behavior  Skin: Warm and Dry. Incision as noted above.     Assessment/Plan  Pacemaker/ICD: Medtronic dual-chamber ICD cobalt XT  MRI model number DD PA 2 D4 serial number [id number] SPR implanted January 7, 2025.   Follow in device clinic as scheduled  Hypotension: Initially hypotensive in office recheck showed blood pressure 90/64.  He does admit to not drinking many fluids lately.  Follow with Dr. Hutton " as scheduled or sooner if needed.     Please excuse any errors in grammar or translation related to dictation, voice recognition software was used to prepare this document.

## 2025-01-13 NOTE — PATIENT INSTRUCTIONS
No creams, powders or lotions near your incision site until it is totally healed.      You may shower, let the water hit your back and run down the front of you.      If a seatbelt bothers you while you are in the car you may place a hand towel between you and the seatbelt.     Stay well hydrated

## 2025-02-25 ENCOUNTER — HOSPITAL ENCOUNTER (OUTPATIENT)
Dept: CARDIOLOGY | Facility: HOSPITAL | Age: 75
Discharge: HOME | End: 2025-02-25
Payer: MEDICARE

## 2025-02-25 DIAGNOSIS — Z95.810 PRESENCE OF AUTOMATIC CARDIOVERTER/DEFIBRILLATOR (AICD): ICD-10-CM

## 2025-02-25 DIAGNOSIS — I42.9 CARDIOMYOPATHY, UNSPECIFIED TYPE (MULTI): ICD-10-CM

## 2025-03-06 ENCOUNTER — APPOINTMENT (OUTPATIENT)
Dept: CARDIOLOGY | Facility: CLINIC | Age: 75
End: 2025-03-06
Payer: MEDICARE

## 2025-03-06 VITALS
SYSTOLIC BLOOD PRESSURE: 98 MMHG | HEIGHT: 69 IN | DIASTOLIC BLOOD PRESSURE: 60 MMHG | WEIGHT: 152.2 LBS | BODY MASS INDEX: 22.54 KG/M2 | HEART RATE: 90 BPM

## 2025-03-06 DIAGNOSIS — Z95.810 ICD (IMPLANTABLE CARDIOVERTER-DEFIBRILLATOR) IN PLACE: ICD-10-CM

## 2025-03-06 DIAGNOSIS — I47.20 VT (VENTRICULAR TACHYCARDIA) (MULTI): Primary | ICD-10-CM

## 2025-03-06 DIAGNOSIS — Z98.61 HX OF PERCUTANEOUS TRANSLUMINAL CORONARY ANGIOPLASTY: ICD-10-CM

## 2025-03-06 DIAGNOSIS — Z78.9 NEVER SMOKED TOBACCO: ICD-10-CM

## 2025-03-06 DIAGNOSIS — I25.5 CARDIOMYOPATHY, ISCHEMIC: ICD-10-CM

## 2025-03-06 PROCEDURE — 3078F DIAST BP <80 MM HG: CPT | Performed by: INTERNAL MEDICINE

## 2025-03-06 PROCEDURE — 1123F ACP DISCUSS/DSCN MKR DOCD: CPT | Performed by: INTERNAL MEDICINE

## 2025-03-06 PROCEDURE — 99024 POSTOP FOLLOW-UP VISIT: CPT | Performed by: INTERNAL MEDICINE

## 2025-03-06 PROCEDURE — 1157F ADVNC CARE PLAN IN RCRD: CPT | Performed by: INTERNAL MEDICINE

## 2025-03-06 PROCEDURE — 1159F MED LIST DOCD IN RCRD: CPT | Performed by: INTERNAL MEDICINE

## 2025-03-06 PROCEDURE — 3074F SYST BP LT 130 MM HG: CPT | Performed by: INTERNAL MEDICINE

## 2025-03-06 NOTE — PROGRESS NOTES
CARDIOLOGY OFFICE VISIT      CHIEF COMPLAINT  Chief Complaint   Patient presents with    Follow-up     Abnormal device check        HISTORY OF PRESENT ILLNESS  HPI  74-year-old male with a history of atherosclerotic heart disease with remote STEMI in November 2006. He underwent angioplasty and stenting of the RCA at that time. An echocardiogram in 2006 showed inferolateral wall hypokinesis and estimated LV ejection fraction of 45%. Aortic valve appeared to be bicuspid. A stress myocardial perfusion study showed mild inferior wall myocardial ischemia versus shifting diaphragmatic attenuation artifact. Calculated LV ejection fraction 44%. He has a history of hypertension, hyperlipidemia, and carotid artery disease.      In September 2023, a repeat echocardiogram showed an estimated LV ejection fraction of 30% with severe hypokinesis of the inferior and inferolateral walls. The distal anterior wall was hypokinetic. Right ventricular chamber size and function are normal. Valvular structure and function were normal. Carotid ultrasound October 5, 2023 showed 50 to 69% stenosis in the right proximal internal carotid artery and greater than 50% stenosis of the right external carotid artery. There was 50 to 69% stenosis of the left internal carotid artery. A myocardial perfusion study on December 4, 2023 showed moderate inferolateral myocardial infarction extends from apex to base. No evidence of myocardial ischemia by perfusion imaging. There was severe LV dysfunction with calculated LV ejection fraction of 29%. Moderate to high risk study though no significant ischemia identified.         Stress test December 2023  IMPRESSION:  Abnormal exercise Myoview cardiac perfusion stress test.  Moderate inferolateral myocardial infarction extending from apex to  base. No evidence of ischemia or myocardial infarction by perfusion  imaging. Severe left ventricular systolic dysfunction, ejection  fraction 29%. No exercise provoked  significant ischemic ECG changes  or chest pain symptoms. Noninvasive risk stratification: Moderate to  high risk though no significant ischemia identified. No previous  available for comparison.     Echocardiogram October 2023     CONCLUSIONS:   1. Left ventricular systolic function is severely decreased with a 30% estimated ejection fraction.   2. There is severe hypokinesis of inferior and inferolateral walls. The basal portion of inferior wall, akinetic. The distal anterior wall is mildly hyopkinetic as is distal anterolateral wall. There is some post extrasystolic potentiation and frequent pvc's.   3. Normal RV size and function      RVSP could not be calculated as no TR doppler envelope.   4. Left ventricular cavity size is moderately dilated.   5. Normal valve structure and function.   6. There are multiple wall motion abnormalities.  Patient states that so far he has been doing well.  He denies any symptoms of chest pain or shortness of palpitations.     Echocardiogram 5/2024  CONCLUSIONS:   1. Left ventricular systolic function is moderately to severely decreased with a 25-30% estimated ejection fraction.   2. There is severe hypokinesis of the majority the myocardium except for the interventricular septum and apical aspect of the septum which are only mildly hypokinetic. No apical thrombi identified. Study similar to that of October 2023.   3. Normal RV size and function      Mild pulm hypertension RVSP 33 mmHg.   4. Left ventricular cavity size is moderate to severely dilated.   5. Normal valve structure and function.   6. There is global hypokinesis of the left ventricle with minor regional variations.     Due to worsening cardiomyopathy, a cardiac  catheterization was recommended during the last office visit in May 2024.     Catheterization June 2024  CONCLUSIONS:   1. Left Main Coronary Artery: This artery is normal.   2. Left Anterior Descending Artery: presents luminal irregularities and contains  patent previously placed stents.   3. Distal LAD Lesion: The percent stenosis is 70%.   4. Circumflex Coronary Artery: presents luminal irregularities and contains patent previously placed stents.   5. Right Coronary Artery: presents luminal irregularities, significantly obstructed and contains patent previously placed stents.   6. Mid RCA Lesion: The percent stenosis is 80%.   7. Mid RCA Lesion: pre-dilation, Resolute Ulises 4.00x26 post-dilation: 0% residual stenosis. RCA: pre-procedure TERRA flow was 3(complete perfusion) and post-procedure TERRA flow was 3(complete perfusion).   8. The Left Ventricular Ejection Fraction is 25%.     Cardiac MRI was performed July 2024.     Cardiac MRI July 2024  IMPRESSION:  1. Normal left ventricular cavity size with severely depressed  systolic function. Ejection fraction 34%.  2. Delayed enhancement imaging reveals non transmural subendocardial  myocardial infarction of the basal inferior wall, and basal inferior  septum. Transmural myocardial infarction of the mid inferior wall,  and mid inferior septum. Non transmural subendocardial myocardial  infarction of the basal/mid inferolateral wall. Non transmural  subendocardial myocardial infarction of the distal inferior wall.  3. Asymmetric air septal left ventricular hypertrophy. Basal anterior  septum 1.2 cm.  4. Normal right ventricular cavity size with preserved systolic  function. RV EF 63%.     Echocardiogram November 2024  CONCLUSIONS:   1. The left ventricular systolic function is moderately decreased, with a visually estimated ejection fraction of 30-35%.   2. There is global hypokinesis of the left ventricle with minor regional variations.   3. Left ventricular cavity size is mildly dilated.   4. On [id number] comparison with the study of May 2024 LV chamber dimension is not quite as dilated. Overall ejection fraction appears slightly improved still less than 35%. There remains akinesis of the inferior inferolateral  wall. Anterolateral wall is still hypokinetic but significantly improved in comparison to prior study.    Due to persistent cardiomyopathy, patient was evaluated in December 2024 in my office.  Patient underwent dual-chamber ICD implantation Medtronic  in January 7, 2025 with no complications.    Patient states that so far he has been doing well.  Patient had a device transmission in February 25, 2025.  Device has 12 years 5 months of longevity.  There was an episode on February 2, 2025 at 10:36 AM.  Patient had an episode of ventricular tachycardia at a rate of 222 bpm.  ATP x 1 terminated this arrhythmia.  He also had episodes of atrial fibrillation with burden of atrial ration 1% with the longest duration 5 hours 25 minutes.      Past Medical History  Past Medical History:   Diagnosis Date    Cancer (Multi)     Prostate    CHF (congestive heart failure)     Coronary artery disease     Hyperlipidemia     Hypertension     Ischemic cardiomyopathy     Right foot drop 02/17/2023       Social History  Social History     Tobacco Use    Smoking status: Never    Smokeless tobacco: Never   Vaping Use    Vaping status: Never Used   Substance Use Topics    Alcohol use: Not Currently     Alcohol/week: 1.0 standard drink of alcohol     Types: 1 Glasses of wine per week     Comment: on occ    Drug use: Never       Family History     Family History   Problem Relation Name Age of Onset    Heart disease Mother      Hypertension Other Family history         Allergies:  No Known Allergies     Outpatient Medications:  Current Outpatient Medications   Medication Instructions    acetaminophen (TYLENOL) 650 mg, oral, Every 4 hours PRN    aspirin 81 mg EC tablet 1 tablet, Daily    atorvastatin (LIPITOR) 80 mg, oral, Daily    clopidogrel (PLAVIX) 75 mg, oral, Daily    empagliflozin (JARDIANCE) 10 mg, oral, Daily    ezetimibe (ZETIA) 10 mg, oral, Daily    fish oil concentrate (Omega-3) 120-180 mg capsule 1 capsule, Daily    metoprolol  succinate XL (TOPROL-XL) 25 mg, oral, Daily    multivitamin with minerals tablet 1 tablet, Daily    nitroglycerin (NITROSTAT) 0.4 mg, sublingual, Every 5 min PRN, May repeat every 5 minutes for up to 3 doses.    sacubitriL-valsartan (Entresto) 24-26 mg tablet 1 TABLET TWICE A DAY    spironolactone (ALDACTONE) 12.5 mg, oral, Daily    traMADol (ULTRAM) 50 mg, oral, Every 6 hours PRN, If no relief from tylenol then alternate tylenol with Tramadol          REVIEW OF SYSTEMS  Review of Systems   All other systems reviewed and are negative.        VITALS  Vitals:    03/06/25 1157   BP: 98/60   Pulse: 90       PHYSICAL EXAM  Constitutional:       Appearance: Healthy appearance. Not in distress.   Neck:      Vascular: No JVR. JVD normal.   Pulmonary:      Effort: Pulmonary effort is normal.      Breath sounds: Normal breath sounds. No wheezing. No rhonchi. No rales.   Chest:      Chest wall: Not tender to palpatation.   Cardiovascular:      PMI at left midclavicular line. Normal rate. Regular rhythm. Normal S1. Normal S2.       Murmurs: There is no murmur.      No gallop.  No click. No rub.      Comments: Device in the left prepectoral healing well.  No signs of hematoma or infection.     Pulses:     Intact distal pulses.   Edema:     Peripheral edema absent.   Abdominal:      General: Bowel sounds are normal.      Palpations: Abdomen is soft.      Tenderness: There is no abdominal tenderness.   Musculoskeletal: Normal range of motion.         General: No tenderness. Skin:     General: Skin is warm and dry.   Neurological:      General: No focal deficit present.      Mental Status: Alert and oriented to person, place and time.           ASSESSMENT AND PLAN  Clinical impression     1.  Cardiomyopathy with a left ventricular action fraction of 30% (September 2023).  Echocardiogram 2024 (November) left ventricular ejection fraction of 30 to 35%  2.  Congestive heart failure, [place] heart association class II  3.  Evidence of  ischemia per stress test in 2023  4.  History of coronary artery disease-ST elevation myocardial infarction in 2006 status post PCI of the right coronary artery.  Status post cardiac catheterization with PCI June 2024  5.  Status post dual-chamber ICD implanted in January 2025 with no complications  6.  Ventricular tachycardia sustained terminated with ATP in February 2025  7.  Evidence of atrial fibrillation by device interrogation    Plan recommendations    I had a lengthy discussion with patient regarding findings of the device interrogation.  Patient had an episode of ventricular tachycardia sustained terminated with ATP in ferry 2025.  Also there is evidence of atrial fibrillation.    Regarding atrial fibrillation patient will benefit of anticoagulation therapy.  He will start Eliquis 5 mg 1 tablet twice a day.    Regarding ventricular tachycardia episodes, patient will benefit of cardiac catheterization.  Procedure, risk, benefits and possible complications were explained to patient.  All questions were answered.  Patient agrees with plan.    Follow my office in the next 2 to 3 weeks or sooner if needed.    If there is no lesion amenable for revascularization amiodarone will be recommended for this patient.    Continue with beta-blocker therapy.    Follow device clinic as scheduled    Risk factor modification and lifestyle modification discussed with patient. Diet , exercise and hydration discussed with patient.    I have personally review with patient during this office visit, laboratory data, echocardiogram results, stress test results, Holter-event monitor results prior and after the last electrophysiology visit. All questions has been answered.    Please excuse any errors in grammar or translation related to this dictation.  Voice recognition software was utilized to prepare this document.

## 2025-03-06 NOTE — H&P (VIEW-ONLY)
CARDIOLOGY OFFICE VISIT      CHIEF COMPLAINT  Chief Complaint   Patient presents with    Follow-up     Abnormal device check        HISTORY OF PRESENT ILLNESS  HPI  74-year-old male with a history of atherosclerotic heart disease with remote STEMI in November 2006. He underwent angioplasty and stenting of the RCA at that time. An echocardiogram in 2006 showed inferolateral wall hypokinesis and estimated LV ejection fraction of 45%. Aortic valve appeared to be bicuspid. A stress myocardial perfusion study showed mild inferior wall myocardial ischemia versus shifting diaphragmatic attenuation artifact. Calculated LV ejection fraction 44%. He has a history of hypertension, hyperlipidemia, and carotid artery disease.      In September 2023, a repeat echocardiogram showed an estimated LV ejection fraction of 30% with severe hypokinesis of the inferior and inferolateral walls. The distal anterior wall was hypokinetic. Right ventricular chamber size and function are normal. Valvular structure and function were normal. Carotid ultrasound October 5, 2023 showed 50 to 69% stenosis in the right proximal internal carotid artery and greater than 50% stenosis of the right external carotid artery. There was 50 to 69% stenosis of the left internal carotid artery. A myocardial perfusion study on December 4, 2023 showed moderate inferolateral myocardial infarction extends from apex to base. No evidence of myocardial ischemia by perfusion imaging. There was severe LV dysfunction with calculated LV ejection fraction of 29%. Moderate to high risk study though no significant ischemia identified.         Stress test December 2023  IMPRESSION:  Abnormal exercise Myoview cardiac perfusion stress test.  Moderate inferolateral myocardial infarction extending from apex to  base. No evidence of ischemia or myocardial infarction by perfusion  imaging. Severe left ventricular systolic dysfunction, ejection  fraction 29%. No exercise provoked  significant ischemic ECG changes  or chest pain symptoms. Noninvasive risk stratification: Moderate to  high risk though no significant ischemia identified. No previous  available for comparison.     Echocardiogram October 2023     CONCLUSIONS:   1. Left ventricular systolic function is severely decreased with a 30% estimated ejection fraction.   2. There is severe hypokinesis of inferior and inferolateral walls. The basal portion of inferior wall, akinetic. The distal anterior wall is mildly hyopkinetic as is distal anterolateral wall. There is some post extrasystolic potentiation and frequent pvc's.   3. Normal RV size and function      RVSP could not be calculated as no TR doppler envelope.   4. Left ventricular cavity size is moderately dilated.   5. Normal valve structure and function.   6. There are multiple wall motion abnormalities.  Patient states that so far he has been doing well.  He denies any symptoms of chest pain or shortness of palpitations.     Echocardiogram 5/2024  CONCLUSIONS:   1. Left ventricular systolic function is moderately to severely decreased with a 25-30% estimated ejection fraction.   2. There is severe hypokinesis of the majority the myocardium except for the interventricular septum and apical aspect of the septum which are only mildly hypokinetic. No apical thrombi identified. Study similar to that of October 2023.   3. Normal RV size and function      Mild pulm hypertension RVSP 33 mmHg.   4. Left ventricular cavity size is moderate to severely dilated.   5. Normal valve structure and function.   6. There is global hypokinesis of the left ventricle with minor regional variations.     Due to worsening cardiomyopathy, a cardiac  catheterization was recommended during the last office visit in May 2024.     Catheterization June 2024  CONCLUSIONS:   1. Left Main Coronary Artery: This artery is normal.   2. Left Anterior Descending Artery: presents luminal irregularities and contains  patent previously placed stents.   3. Distal LAD Lesion: The percent stenosis is 70%.   4. Circumflex Coronary Artery: presents luminal irregularities and contains patent previously placed stents.   5. Right Coronary Artery: presents luminal irregularities, significantly obstructed and contains patent previously placed stents.   6. Mid RCA Lesion: The percent stenosis is 80%.   7. Mid RCA Lesion: pre-dilation, Resolute Ulises 4.00x26 post-dilation: 0% residual stenosis. RCA: pre-procedure TERRA flow was 3(complete perfusion) and post-procedure TERRA flow was 3(complete perfusion).   8. The Left Ventricular Ejection Fraction is 25%.     Cardiac MRI was performed July 2024.     Cardiac MRI July 2024  IMPRESSION:  1. Normal left ventricular cavity size with severely depressed  systolic function. Ejection fraction 34%.  2. Delayed enhancement imaging reveals non transmural subendocardial  myocardial infarction of the basal inferior wall, and basal inferior  septum. Transmural myocardial infarction of the mid inferior wall,  and mid inferior septum. Non transmural subendocardial myocardial  infarction of the basal/mid inferolateral wall. Non transmural  subendocardial myocardial infarction of the distal inferior wall.  3. Asymmetric air septal left ventricular hypertrophy. Basal anterior  septum 1.2 cm.  4. Normal right ventricular cavity size with preserved systolic  function. RV EF 63%.     Echocardiogram November 2024  CONCLUSIONS:   1. The left ventricular systolic function is moderately decreased, with a visually estimated ejection fraction of 30-35%.   2. There is global hypokinesis of the left ventricle with minor regional variations.   3. Left ventricular cavity size is mildly dilated.   4. On [id number] comparison with the study of May 2024 LV chamber dimension is not quite as dilated. Overall ejection fraction appears slightly improved still less than 35%. There remains akinesis of the inferior inferolateral  wall. Anterolateral wall is still hypokinetic but significantly improved in comparison to prior study.    Due to persistent cardiomyopathy, patient was evaluated in December 2024 in my office.  Patient underwent dual-chamber ICD implantation [employer]  in January 7, 2025 with no complications.    Patient states that so far he has been doing well.  Patient had a device transmission in February 25, 2025.  Device has 12 years 5 months of longevity.  There was an episode on February 2, 2025 at 10:36 AM.  Patient had an episode of ventricular tachycardia at a rate of 222 bpm.  ATP x 1 terminated this arrhythmia.  He also had episodes of atrial fibrillation with burden of atrial ration 1% with the longest duration 5 hours 25 minutes.      Past Medical History  Past Medical History:   Diagnosis Date    Cancer (Multi)     Prostate    CHF (congestive heart failure)     Coronary artery disease     Hyperlipidemia     Hypertension     Ischemic cardiomyopathy     Right foot drop 02/17/2023       Social History  Social History     Tobacco Use    Smoking status: Never    Smokeless tobacco: Never   Vaping Use    Vaping status: Never Used   Substance Use Topics    Alcohol use: Not Currently     Alcohol/week: 1.0 standard drink of alcohol     Types: 1 Glasses of wine per week     Comment: on occ    Drug use: Never       Family History     Family History   Problem Relation Name Age of Onset    Heart disease Mother      Hypertension Other Family history         Allergies:  No Known Allergies     Outpatient Medications:  Current Outpatient Medications   Medication Instructions    acetaminophen (TYLENOL) 650 mg, oral, Every 4 hours PRN    aspirin 81 mg EC tablet 1 tablet, Daily    atorvastatin (LIPITOR) 80 mg, oral, Daily    clopidogrel (PLAVIX) 75 mg, oral, Daily    empagliflozin (JARDIANCE) 10 mg, oral, Daily    ezetimibe (ZETIA) 10 mg, oral, Daily    fish oil concentrate (Omega-3) 120-180 mg capsule 1 capsule, Daily    metoprolol  succinate XL (TOPROL-XL) 25 mg, oral, Daily    multivitamin with minerals tablet 1 tablet, Daily    nitroglycerin (NITROSTAT) 0.4 mg, sublingual, Every 5 min PRN, May repeat every 5 minutes for up to 3 doses.    sacubitriL-valsartan (Entresto) 24-26 mg tablet 1 TABLET TWICE A DAY    spironolactone (ALDACTONE) 12.5 mg, oral, Daily    traMADol (ULTRAM) 50 mg, oral, Every 6 hours PRN, If no relief from tylenol then alternate tylenol with Tramadol          REVIEW OF SYSTEMS  Review of Systems   All other systems reviewed and are negative.        VITALS  Vitals:    03/06/25 1157   BP: 98/60   Pulse: 90       PHYSICAL EXAM  Constitutional:       Appearance: Healthy appearance. Not in distress.   Neck:      Vascular: No JVR. JVD normal.   Pulmonary:      Effort: Pulmonary effort is normal.      Breath sounds: Normal breath sounds. No wheezing. No rhonchi. No rales.   Chest:      Chest wall: Not tender to palpatation.   Cardiovascular:      PMI at left midclavicular line. Normal rate. Regular rhythm. Normal S1. Normal S2.       Murmurs: There is no murmur.      No gallop.  No click. No rub.      Comments: Device in the left prepectoral healing well.  No signs of hematoma or infection.     Pulses:     Intact distal pulses.   Edema:     Peripheral edema absent.   Abdominal:      General: Bowel sounds are normal.      Palpations: Abdomen is soft.      Tenderness: There is no abdominal tenderness.   Musculoskeletal: Normal range of motion.         General: No tenderness. Skin:     General: Skin is warm and dry.   Neurological:      General: No focal deficit present.      Mental Status: Alert and oriented to person, place and time.           ASSESSMENT AND PLAN  Clinical impression     1.  Cardiomyopathy with a left ventricular action fraction of 30% (September 2023).  Echocardiogram 2024 (November) left ventricular ejection fraction of 30 to 35%  2.  Congestive heart failure, [place] heart association class II  3.  Evidence of  ischemia per stress test in 2023  4.  History of coronary artery disease-ST elevation myocardial infarction in 2006 status post PCI of the right coronary artery.  Status post cardiac catheterization with PCI June 2024  5.  Status post dual-chamber ICD implanted in January 2025 with no complications  6.  Ventricular tachycardia sustained terminated with ATP in February 2025  7.  Evidence of atrial fibrillation by device interrogation    Plan recommendations    I had a lengthy discussion with patient regarding findings of the device interrogation.  Patient had an episode of ventricular tachycardia sustained terminated with ATP in ferry 2025.  Also there is evidence of atrial fibrillation.    Regarding atrial fibrillation patient will benefit of anticoagulation therapy.  He will start Eliquis 5 mg 1 tablet twice a day.    Regarding ventricular tachycardia episodes, patient will benefit of cardiac catheterization.  Procedure, risk, benefits and possible complications were explained to patient.  All questions were answered.  Patient agrees with plan.    Follow my office in the next 2 to 3 weeks or sooner if needed.    If there is no lesion amenable for revascularization amiodarone will be recommended for this patient.    Continue with beta-blocker therapy.    Follow device clinic as scheduled    Risk factor modification and lifestyle modification discussed with patient. Diet , exercise and hydration discussed with patient.    I have personally review with patient during this office visit, laboratory data, echocardiogram results, stress test results, Holter-event monitor results prior and after the last electrophysiology visit. All questions has been answered.    Please excuse any errors in grammar or translation related to this dictation.  Voice recognition software was utilized to prepare this document.

## 2025-03-06 NOTE — PATIENT INSTRUCTIONS
Follow up 3-4 weeks with Dr. Hutton with [employer] rep present    Dr. Hutton would like you to have a cath next week in [place]  Labs to be done today  Hold Jardiance 3 days prior to cath    Start Eliquis 5 mg 1 tablet twice daily after cath is completed    Keep device checks as scheduled      DID YOU KNOW  We have a pharmacy here in the [facility].  They can fill all prescriptions, not just cardiac medications.  Prescriptions from other pharmacies can easily be transferred to the  pharmacy by the  pharmacist on site.   pharmacies offer FREE HOME DELIVERY on medications to anywhere in [place]. They can sync your medications. Typically prescriptions can be ready in 10 - 15 minutes. If pharmacy is unable to fill your  prescription or if cost is more than your paying now the Pharmacist can easily transfer back to your Pharmacy of choice. Pharmacy phone # [phone].     Please bring all medicines, vitamins, and herbal supplements with you in original bottles to every appointment!!!!    Prescriptions will not be filled unless you are compliant with your follow up appointments or have a follow up appointment scheduled as per instruction of your physician. Refills should be requested at the time of your visit.

## 2025-03-07 ENCOUNTER — LAB (OUTPATIENT)
Dept: LAB | Facility: HOSPITAL | Age: 75
End: 2025-03-07
Payer: MEDICARE

## 2025-03-07 ENCOUNTER — TELEPHONE (OUTPATIENT)
Dept: CARDIOLOGY | Facility: CLINIC | Age: 75
End: 2025-03-07
Payer: MEDICARE

## 2025-03-07 PROBLEM — Z95.810 ICD (IMPLANTABLE CARDIOVERTER-DEFIBRILLATOR) IN PLACE: Status: ACTIVE | Noted: 2025-03-06

## 2025-03-07 PROBLEM — I47.20 VT (VENTRICULAR TACHYCARDIA) (MULTI): Status: ACTIVE | Noted: 2025-03-06

## 2025-03-07 LAB
ANION GAP SERPL CALC-SCNC: 14 MMOL/L (ref 10–20)
BUN SERPL-MCNC: 22 MG/DL (ref 6–23)
CALCIUM SERPL-MCNC: 9.3 MG/DL (ref 8.6–10.3)
CHLORIDE SERPL-SCNC: 104 MMOL/L (ref 98–107)
CO2 SERPL-SCNC: 26 MMOL/L (ref 21–32)
CREAT SERPL-MCNC: 1.1 MG/DL (ref 0.5–1.3)
EGFRCR SERPLBLD CKD-EPI 2021: 70 ML/MIN/1.73M*2
ERYTHROCYTE [DISTWIDTH] IN BLOOD BY AUTOMATED COUNT: 13.1 % (ref 11.5–14.5)
GLUCOSE SERPL-MCNC: 149 MG/DL (ref 74–99)
HCT VFR BLD AUTO: 45.8 % (ref 41–52)
HGB BLD-MCNC: 15.3 G/DL (ref 13.5–17.5)
INR PPP: 1 (ref 0.9–1.1)
MCH RBC QN AUTO: 34.5 PG (ref 26–34)
MCHC RBC AUTO-ENTMCNC: 33.4 G/DL (ref 32–36)
MCV RBC AUTO: 103 FL (ref 80–100)
NRBC BLD-RTO: 0 /100 WBCS (ref 0–0)
PLATELET # BLD AUTO: 142 X10*3/UL (ref 150–450)
POTASSIUM SERPL-SCNC: 4.2 MMOL/L (ref 3.5–5.3)
PROTHROMBIN TIME: 11.1 SECONDS (ref 9.8–12.4)
RBC # BLD AUTO: 4.43 X10*6/UL (ref 4.5–5.9)
SODIUM SERPL-SCNC: 140 MMOL/L (ref 136–145)
WBC # BLD AUTO: 7.6 X10*3/UL (ref 4.4–11.3)

## 2025-03-07 PROCEDURE — 85610 PROTHROMBIN TIME: CPT

## 2025-03-07 PROCEDURE — 85027 COMPLETE CBC AUTOMATED: CPT

## 2025-03-07 PROCEDURE — 80048 BASIC METABOLIC PNL TOTAL CA: CPT

## 2025-03-07 NOTE — TELEPHONE ENCOUNTER
Pt calls asking for clarification on Eliquis.  He was seen yesterday by Dr. Hutton and was instructed to start taking Eliquis 5mg twice daily after cath is completed. Pt is scheduled for cath on 3/11/25 and wants to know if he is suppose to stop taking his Plavix 75mg daily when he starts taking the Eliquis or if he is to take both?

## 2025-03-11 ENCOUNTER — HOSPITAL ENCOUNTER (OUTPATIENT)
Facility: HOSPITAL | Age: 75
Setting detail: OUTPATIENT SURGERY
Discharge: HOME | End: 2025-03-11
Attending: INTERNAL MEDICINE | Admitting: INTERNAL MEDICINE
Payer: MEDICARE

## 2025-03-11 ENCOUNTER — APPOINTMENT (OUTPATIENT)
Dept: CARDIOLOGY | Facility: HOSPITAL | Age: 75
End: 2025-03-11
Payer: MEDICARE

## 2025-03-11 DIAGNOSIS — Z95.810 ICD (IMPLANTABLE CARDIOVERTER-DEFIBRILLATOR) IN PLACE: ICD-10-CM

## 2025-03-11 DIAGNOSIS — I47.20 VT (VENTRICULAR TACHYCARDIA) (MULTI): Primary | ICD-10-CM

## 2025-03-11 DIAGNOSIS — I25.5 CARDIOMYOPATHY, ISCHEMIC: ICD-10-CM

## 2025-03-11 LAB
ANION GAP SERPL CALC-SCNC: 12 MMOL/L (ref 10–20)
APTT PPP: 29 SECONDS (ref 26–36)
ATRIAL RATE: 93 BPM
BUN SERPL-MCNC: 20 MG/DL (ref 6–23)
CALCIUM SERPL-MCNC: 9.5 MG/DL (ref 8.6–10.3)
CHLORIDE SERPL-SCNC: 104 MMOL/L (ref 98–107)
CO2 SERPL-SCNC: 26 MMOL/L (ref 21–32)
CREAT SERPL-MCNC: 1.07 MG/DL (ref 0.5–1.3)
EGFRCR SERPLBLD CKD-EPI 2021: 72 ML/MIN/1.73M*2
ERYTHROCYTE [DISTWIDTH] IN BLOOD BY AUTOMATED COUNT: 12.6 % (ref 11.5–14.5)
GLUCOSE SERPL-MCNC: 97 MG/DL (ref 74–99)
HCT VFR BLD AUTO: 42.6 % (ref 41–52)
HGB BLD-MCNC: 15 G/DL (ref 13.5–17.5)
INR PPP: 1 (ref 0.9–1.1)
MCH RBC QN AUTO: 35 PG (ref 26–34)
MCHC RBC AUTO-ENTMCNC: 35.2 G/DL (ref 32–36)
MCV RBC AUTO: 99 FL (ref 80–100)
NRBC BLD-RTO: 0 /100 WBCS (ref 0–0)
P AXIS: 64 DEGREES
P OFFSET: 183 MS
P ONSET: 125 MS
PLATELET # BLD AUTO: 141 X10*3/UL (ref 150–450)
POTASSIUM SERPL-SCNC: 4.4 MMOL/L (ref 3.5–5.3)
PR INTERVAL: 172 MS
PROTHROMBIN TIME: 11.5 SECONDS (ref 9.8–12.4)
Q ONSET: 211 MS
QRS COUNT: 15 BEATS
QRS DURATION: 96 MS
QT INTERVAL: 376 MS
QTC CALCULATION(BAZETT): 467 MS
QTC FREDERICIA: 435 MS
R AXIS: 25 DEGREES
RBC # BLD AUTO: 4.29 X10*6/UL (ref 4.5–5.9)
SODIUM SERPL-SCNC: 138 MMOL/L (ref 136–145)
T AXIS: 169 DEGREES
T OFFSET: 399 MS
VENTRICULAR RATE: 93 BPM
WBC # BLD AUTO: 7.1 X10*3/UL (ref 4.4–11.3)

## 2025-03-11 PROCEDURE — 99024 POSTOP FOLLOW-UP VISIT: CPT | Performed by: NURSE PRACTITIONER

## 2025-03-11 PROCEDURE — 7100000009 HC PHASE TWO TIME - INITIAL BASE CHARGE: Performed by: INTERNAL MEDICINE

## 2025-03-11 PROCEDURE — 80048 BASIC METABOLIC PNL TOTAL CA: CPT | Performed by: NURSE PRACTITIONER

## 2025-03-11 PROCEDURE — 2780000003 HC OR 278 NO HCPCS: Performed by: INTERNAL MEDICINE

## 2025-03-11 PROCEDURE — G0269 OCCLUSIVE DEVICE IN VEIN ART: HCPCS | Mod: 59 | Performed by: INTERNAL MEDICINE

## 2025-03-11 PROCEDURE — 7100000002 HC RECOVERY ROOM TIME - EACH INCREMENTAL 1 MINUTE: Performed by: INTERNAL MEDICINE

## 2025-03-11 PROCEDURE — 93005 ELECTROCARDIOGRAM TRACING: CPT | Mod: 59

## 2025-03-11 PROCEDURE — 2720000007 HC OR 272 NO HCPCS: Performed by: INTERNAL MEDICINE

## 2025-03-11 PROCEDURE — 99152 MOD SED SAME PHYS/QHP 5/>YRS: CPT | Performed by: INTERNAL MEDICINE

## 2025-03-11 PROCEDURE — 85027 COMPLETE CBC AUTOMATED: CPT | Performed by: NURSE PRACTITIONER

## 2025-03-11 PROCEDURE — 93454 CORONARY ARTERY ANGIO S&I: CPT | Performed by: INTERNAL MEDICINE

## 2025-03-11 PROCEDURE — 2550000001 HC RX 255 CONTRASTS: Performed by: INTERNAL MEDICINE

## 2025-03-11 PROCEDURE — 2500000001 HC RX 250 WO HCPCS SELF ADMINISTERED DRUGS (ALT 637 FOR MEDICARE OP): Performed by: NURSE PRACTITIONER

## 2025-03-11 PROCEDURE — 7100000010 HC PHASE TWO TIME - EACH INCREMENTAL 1 MINUTE: Performed by: INTERNAL MEDICINE

## 2025-03-11 PROCEDURE — 93458 L HRT ARTERY/VENTRICLE ANGIO: CPT | Performed by: INTERNAL MEDICINE

## 2025-03-11 PROCEDURE — 85610 PROTHROMBIN TIME: CPT | Performed by: NURSE PRACTITIONER

## 2025-03-11 PROCEDURE — 7100000001 HC RECOVERY ROOM TIME - INITIAL BASE CHARGE: Performed by: INTERNAL MEDICINE

## 2025-03-11 PROCEDURE — 2500000004 HC RX 250 GENERAL PHARMACY W/ HCPCS (ALT 636 FOR OP/ED): Performed by: INTERNAL MEDICINE

## 2025-03-11 PROCEDURE — 36415 COLL VENOUS BLD VENIPUNCTURE: CPT | Performed by: NURSE PRACTITIONER

## 2025-03-11 PROCEDURE — C1760 CLOSURE DEV, VASC: HCPCS | Performed by: INTERNAL MEDICINE

## 2025-03-11 RX ORDER — MIDAZOLAM HYDROCHLORIDE 1 MG/ML
INJECTION, SOLUTION INTRAMUSCULAR; INTRAVENOUS AS NEEDED
Status: DISCONTINUED | OUTPATIENT
Start: 2025-03-11 | End: 2025-03-11 | Stop reason: HOSPADM

## 2025-03-11 RX ORDER — FENTANYL CITRATE 50 UG/ML
INJECTION, SOLUTION INTRAMUSCULAR; INTRAVENOUS AS NEEDED
Status: DISCONTINUED | OUTPATIENT
Start: 2025-03-11 | End: 2025-03-11 | Stop reason: HOSPADM

## 2025-03-11 RX ORDER — RANOLAZINE 500 MG/1
500 TABLET, EXTENDED RELEASE ORAL ONCE
Status: COMPLETED | OUTPATIENT
Start: 2025-03-11 | End: 2025-03-11

## 2025-03-11 RX ORDER — LIDOCAINE HYDROCHLORIDE 20 MG/ML
INJECTION, SOLUTION INFILTRATION; PERINEURAL AS NEEDED
Status: DISCONTINUED | OUTPATIENT
Start: 2025-03-11 | End: 2025-03-11 | Stop reason: HOSPADM

## 2025-03-11 RX ORDER — ASPIRIN 325 MG
325 TABLET ORAL ONCE
Status: DISCONTINUED | OUTPATIENT
Start: 2025-03-11 | End: 2025-03-11 | Stop reason: HOSPADM

## 2025-03-11 RX ADMIN — RANOLAZINE 500 MG: 500 TABLET, FILM COATED, EXTENDED RELEASE ORAL at 09:01

## 2025-03-11 ASSESSMENT — PAIN - FUNCTIONAL ASSESSMENT: PAIN_FUNCTIONAL_ASSESSMENT: 0-10

## 2025-03-11 ASSESSMENT — PAIN SCALES - GENERAL
PAINLEVEL_OUTOF10: 0 - NO PAIN

## 2025-03-11 ASSESSMENT — COLUMBIA-SUICIDE SEVERITY RATING SCALE - C-SSRS
2. HAVE YOU ACTUALLY HAD ANY THOUGHTS OF KILLING YOURSELF?: NO
6. HAVE YOU EVER DONE ANYTHING, STARTED TO DO ANYTHING, OR PREPARED TO DO ANYTHING TO END YOUR LIFE?: NO
1. IN THE PAST MONTH, HAVE YOU WISHED YOU WERE DEAD OR WISHED YOU COULD GO TO SLEEP AND NOT WAKE UP?: NO

## 2025-03-11 NOTE — NURSING NOTE
Reviewed AVS discharge instructions with patient and son. All questions answered. Both verbalize understanding of activity restrictions, groin care, medications and follow-up appointments.

## 2025-03-11 NOTE — PROGRESS NOTES
Patient is stable status post LHC under the care of Dr. Woody.  Discussed results of procedure with patient and his family member.  Pictures provided.  Findings of the LHC revealed no significant change from previous LHC.  Patient has noted 80% stenosis in the very distal LAD.  Medical management is advised.  Please see procedural report for complete details.  Patient is scheduled to follow-up with Dr. Hutton next month or sooner as needed.  Patient instructed to begin taking Eliquis on 3/12/2025.  Postprocedural activity, restrictions, potential complications, medications and future follow-up discussed at length.  All questions answered.  Both verbalized and understanding.

## 2025-03-11 NOTE — POST-PROCEDURE NOTE
Physician Transition of Care Summary  Invasive Cardiovascular Lab    Procedure Date: 3/11/2025  Attending:    * Alfonso Woody - Primary  Resident/Fellow/Other Assistant: Surgeons and Role:  * No surgeons found with a matching role *    Pre Procedure Diagnosis:   Ventricular tachycardia, paroxysmal atrial fibrillation, ischemic cardiomyopathy, CAD, remote multivessel PCI    Post Procedure Diagnosis:   Single-vessel CAD manifested by 80% distal LAD lesion, medical management    Complications:   None    Stents/Implants:   None    Anticoagulation/Antiplatelet Plan:   As prior to cardiac catheterization    Estimated Blood Loss:   0 mL    Electronically signed by: Alfonso Woody MD, 3/11/2025 11:36 AM    Anesthesia: Moderate                            anesthesia Staff: None

## 2025-03-14 VITALS
OXYGEN SATURATION: 100 % | WEIGHT: 154.98 LBS | BODY MASS INDEX: 22.96 KG/M2 | SYSTOLIC BLOOD PRESSURE: 102 MMHG | DIASTOLIC BLOOD PRESSURE: 66 MMHG | RESPIRATION RATE: 10 BRPM | TEMPERATURE: 98.2 F | HEIGHT: 69 IN | HEART RATE: 97 BPM

## 2025-04-16 ENCOUNTER — APPOINTMENT (OUTPATIENT)
Dept: CARDIOLOGY | Facility: CLINIC | Age: 75
End: 2025-04-16
Payer: MEDICARE

## 2025-04-16 ENCOUNTER — HOSPITAL ENCOUNTER (OUTPATIENT)
Dept: CARDIOLOGY | Facility: HOSPITAL | Age: 75
Discharge: HOME | End: 2025-04-16
Payer: MEDICARE

## 2025-04-16 ENCOUNTER — HOSPITAL ENCOUNTER (OUTPATIENT)
Dept: RADIOLOGY | Facility: HOSPITAL | Age: 75
Discharge: HOME | End: 2025-04-16
Payer: MEDICARE

## 2025-04-16 VITALS
WEIGHT: 149.2 LBS | SYSTOLIC BLOOD PRESSURE: 108 MMHG | BODY MASS INDEX: 22.1 KG/M2 | HEIGHT: 69 IN | DIASTOLIC BLOOD PRESSURE: 70 MMHG | HEART RATE: 101 BPM

## 2025-04-16 DIAGNOSIS — I42.9 CARDIOMYOPATHY: ICD-10-CM

## 2025-04-16 DIAGNOSIS — I42.0 DILATED CARDIOMYOPATHY (MULTI): ICD-10-CM

## 2025-04-16 DIAGNOSIS — Z78.9 NEVER SMOKED TOBACCO: ICD-10-CM

## 2025-04-16 DIAGNOSIS — Z95.810 ICD (IMPLANTABLE CARDIOVERTER-DEFIBRILLATOR) IN PLACE: ICD-10-CM

## 2025-04-16 DIAGNOSIS — I10 PRIMARY HYPERTENSION: ICD-10-CM

## 2025-04-16 DIAGNOSIS — I47.20 VT (VENTRICULAR TACHYCARDIA) (MULTI): Primary | ICD-10-CM

## 2025-04-16 PROCEDURE — 1157F ADVNC CARE PLAN IN RCRD: CPT | Performed by: INTERNAL MEDICINE

## 2025-04-16 PROCEDURE — 3078F DIAST BP <80 MM HG: CPT | Performed by: INTERNAL MEDICINE

## 2025-04-16 PROCEDURE — 71046 X-RAY EXAM CHEST 2 VIEWS: CPT

## 2025-04-16 PROCEDURE — 93000 ELECTROCARDIOGRAM COMPLETE: CPT | Mod: DISTINCT PROCEDURAL SERVICE | Performed by: INTERNAL MEDICINE

## 2025-04-16 PROCEDURE — 93283 PRGRMG EVAL IMPLANTABLE DFB: CPT | Performed by: INTERNAL MEDICINE

## 2025-04-16 PROCEDURE — 1123F ACP DISCUSS/DSCN MKR DOCD: CPT | Performed by: INTERNAL MEDICINE

## 2025-04-16 PROCEDURE — 1159F MED LIST DOCD IN RCRD: CPT | Performed by: INTERNAL MEDICINE

## 2025-04-16 PROCEDURE — 1036F TOBACCO NON-USER: CPT | Performed by: INTERNAL MEDICINE

## 2025-04-16 PROCEDURE — 99215 OFFICE O/P EST HI 40 MIN: CPT | Performed by: INTERNAL MEDICINE

## 2025-04-16 PROCEDURE — 93283 PRGRMG EVAL IMPLANTABLE DFB: CPT

## 2025-04-16 PROCEDURE — 3074F SYST BP LT 130 MM HG: CPT | Performed by: INTERNAL MEDICINE

## 2025-04-16 ASSESSMENT — ENCOUNTER SYMPTOMS
DYSPNEA ON EXERTION: 1
PALPITATIONS: 0

## 2025-04-16 NOTE — PATIENT INSTRUCTIONS
Continue same medications/treatment.  Patient educated on proper medication use.  Patient educated on risk factor modification.  Please bring any lab results from other providers/physicians to your next appointment.    Please bring all medicines, vitamins, and herbal supplements with you when you come to the office.    Prescriptions will not be filled unless you are compliant with your follow up appointments or have a follow up appointment scheduled as per instruction of your physician. Refills should be requested at the time of your visit.    Follow up with Dr. Hutton in 6 weeks with the medLizzie Pineda RN, AM SCRIBING FOR, AND IN THE PRESENCE OF DR. FELIPE HUTTON MD    
declines

## 2025-04-16 NOTE — PROGRESS NOTES
CARDIOLOGY OFFICE VISIT      CHIEF COMPLAINT  Chief Complaint   Patient presents with    Device Check    Follow-up    Abnormal ECG       HISTORY OF PRESENT ILLNESS  HPI  74-year-old male with a history of atherosclerotic heart disease with remote STEMI in November 2006. He underwent angioplasty and stenting of the RCA at that time. An echocardiogram in 2006 showed inferolateral wall hypokinesis and estimated LV ejection fraction of 45%. Aortic valve appeared to be bicuspid. A stress myocardial perfusion study showed mild inferior wall myocardial ischemia versus shifting diaphragmatic attenuation artifact. Calculated LV ejection fraction 44%. He has a history of hypertension, hyperlipidemia, and carotid artery disease.      In September 2023, a repeat echocardiogram showed an estimated LV ejection fraction of 30% with severe hypokinesis of the inferior and inferolateral walls. The distal anterior wall was hypokinetic. Right ventricular chamber size and function are normal. Valvular structure and function were normal. Carotid ultrasound October 5, 2023 showed 50 to 69% stenosis in the right proximal internal carotid artery and greater than 50% stenosis of the right external carotid artery. There was 50 to 69% stenosis of the left internal carotid artery. A myocardial perfusion study on December 4, 2023 showed moderate inferolateral myocardial infarction extends from apex to base. No evidence of myocardial ischemia by perfusion imaging. There was severe LV dysfunction with calculated LV ejection fraction of 29%. Moderate to high risk study though no significant ischemia identified.         Stress test December 2023  IMPRESSION:  Abnormal exercise Myoview cardiac perfusion stress test.  Moderate inferolateral myocardial infarction extending from apex to  base. No evidence of ischemia or myocardial infarction by perfusion  imaging. Severe left ventricular systolic dysfunction, ejection  fraction 29%. No exercise provoked  significant ischemic ECG changes  or chest pain symptoms. Noninvasive risk stratification: Moderate to  high risk though no significant ischemia identified. No previous  available for comparison.     Echocardiogram October 2023     CONCLUSIONS:   1. Left ventricular systolic function is severely decreased with a 30% estimated ejection fraction.   2. There is severe hypokinesis of inferior and inferolateral walls. The basal portion of inferior wall, akinetic. The distal anterior wall is mildly hyopkinetic as is distal anterolateral wall. There is some post extrasystolic potentiation and frequent pvc's.   3. Normal RV size and function      RVSP could not be calculated as no TR doppler envelope.   4. Left ventricular cavity size is moderately dilated.   5. Normal valve structure and function.   6. There are multiple wall motion abnormalities.  Patient states that so far he has been doing well.  He denies any symptoms of chest pain or shortness of palpitations.     Echocardiogram 5/2024  CONCLUSIONS:   1. Left ventricular systolic function is moderately to severely decreased with a 25-30% estimated ejection fraction.   2. There is severe hypokinesis of the majority the myocardium except for the interventricular septum and apical aspect of the septum which are only mildly hypokinetic. No apical thrombi identified. Study similar to that of October 2023.   3. Normal RV size and function      Mild pulm hypertension RVSP 33 mmHg.   4. Left ventricular cavity size is moderate to severely dilated.   5. Normal valve structure and function.   6. There is global hypokinesis of the left ventricle with minor regional variations.     Due to worsening cardiomyopathy, a cardiac  catheterization was recommended during the last office visit in May 2024.     Catheterization June 2024  CONCLUSIONS:   1. Left Main Coronary Artery: This artery is normal.   2. Left Anterior Descending Artery: presents luminal irregularities and contains  patent previously placed stents.   3. Distal LAD Lesion: The percent stenosis is 70%.   4. Circumflex Coronary Artery: presents luminal irregularities and contains patent previously placed stents.   5. Right Coronary Artery: presents luminal irregularities, significantly obstructed and contains patent previously placed stents.   6. Mid RCA Lesion: The percent stenosis is 80%.   7. Mid RCA Lesion: pre-dilation, Resolute Ulises 4.00x26 post-dilation: 0% residual stenosis. RCA: pre-procedure TERRA flow was 3(complete perfusion) and post-procedure TERRA flow was 3(complete perfusion).   8. The Left Ventricular Ejection Fraction is 25%.     Cardiac MRI was performed July 2024.     Cardiac MRI July 2024  IMPRESSION:  1. Normal left ventricular cavity size with severely depressed  systolic function. Ejection fraction 34%.  2. Delayed enhancement imaging reveals non transmural subendocardial  myocardial infarction of the basal inferior wall, and basal inferior  septum. Transmural myocardial infarction of the mid inferior wall,  and mid inferior septum. Non transmural subendocardial myocardial  infarction of the basal/mid inferolateral wall. Non transmural  subendocardial myocardial infarction of the distal inferior wall.  3. Asymmetric air septal left ventricular hypertrophy. Basal anterior  septum 1.2 cm.  4. Normal right ventricular cavity size with preserved systolic  function. RV EF 63%.     Echocardiogram November 2024  CONCLUSIONS:   1. The left ventricular systolic function is moderately decreased, with a visually estimated ejection fraction of 30-35%.   2. There is global hypokinesis of the left ventricle with minor regional variations.   3. Left ventricular cavity size is mildly dilated.   4. On [id number] comparison with the study of May 2024 LV chamber dimension is not quite as dilated. Overall ejection fraction appears slightly improved still less than 35%. There remains akinesis of the inferior inferolateral  wall. Anterolateral wall is still hypokinetic but significantly improved in comparison to prior study.     Due to persistent cardiomyopathy, patient was evaluated in December 2024 in my office.  Patient underwent dual-chamber ICD implantation Med[employer]  in January 7, 2025 with no complications.    Patient had a device transmission in February 25, 2025. Device has 12 years 5 months of longevity. There was an episode on February 2, 2025 at 10:36 AM. Patient had an episode of ventricular tachycardia at a rate of 222 bpm. ATP x 1 terminated this arrhythmia. He also had episodes of atrial fibrillation with burden of atrial ration 1% with the longest duration 5 hours 25 minutes.    Patient underwent a cardiac catheterization March 2025    Cardiac cath 03/2025  CONCLUSIONS:   1. The entire Left Main: 0% stenosis.   2. Left Anterior Descending Artery: contains patent previously placed stents.   3. Proximal LAD Lesion: The percent stenosis is 30%.   4. Distal LAD Lesion: The percent stenosis is 80%.   5. Circumflex Coronary Artery: contains patent previously placed stents.   6. Distal CX Lesion: The percent stenosis is 30%.   7. Right Coronary Artery: contains patent previously placed stents.   8. Mid RCA Lesion: The percent stenosis is 10-30%.   9. Prox PDA RCA Lesion: The percent stenosis is 50 %.    So far he has been doing well since cardiac catheterization.  No chest pain or shortness of breath.    Device interrogation today at device clinic shows dual-chamber ICD Medtronic with better injury 12 years 7 months.  [place] of atrial fibrillation 0.5% of the time.  No high ventricular events noted.    EKG performed today shows sinus tachycardia rate of 104 bpm QRS duration 90 ms QT corrected 452 ms.  Rhythm strip shows the same pattern.           Past Medical History  Medical History[1]    Social History  Social History[2]    Family History   Family History[3]     Allergies:  RX Allergies[4]     Outpatient  Medications:  Current Outpatient Medications   Medication Instructions    acetaminophen (TYLENOL) 650 mg, oral, Every 4 hours PRN    apixaban (ELIQUIS) 5 mg, oral, 2 times daily    atorvastatin (LIPITOR) 80 mg, oral, Daily    clopidogrel (PLAVIX) 75 mg, oral, Daily    empagliflozin (JARDIANCE) 10 mg, oral, Daily    ezetimibe (ZETIA) 10 mg, oral, Daily    fish oil concentrate (Omega-3) 120-180 mg capsule 1 capsule, Daily    metoprolol succinate XL (TOPROL-XL) 25 mg, oral, Daily    multivitamin with minerals tablet 1 tablet, Daily    nitroglycerin (NITROSTAT) 0.4 mg, sublingual, Every 5 min PRN, May repeat every 5 minutes for up to 3 doses.    sacubitriL-valsartan (Entresto) 24-26 mg tablet 1 TABLET TWICE A DAY    spironolactone (ALDACTONE) 12.5 mg, oral, Daily    traMADol (ULTRAM) 50 mg, oral, Every 6 hours PRN, If no relief from tylenol then alternate tylenol with Tramadol          REVIEW OF SYSTEMS  Review of Systems   Cardiovascular:  Positive for dyspnea on exertion. Negative for chest pain and palpitations.   All other systems reviewed and are negative.        VITALS  Vitals:    04/16/25 1259   BP: 108/70   Pulse: 101       PHYSICAL EXAM  Constitutional:       General: Awake.      Appearance: Normal and healthy appearance. Well-developed and not in distress.   Neck:      Vascular: No JVR. JVD normal.   Pulmonary:      Effort: Pulmonary effort is normal.      Breath sounds: Normal breath sounds. No wheezing. No rhonchi. No rales.   Chest:      Chest wall: Not tender to palpatation.      Comments: Left sided device pocket- healed and well approximated. No swelling or hematoma      Cardiovascular:      PMI at left midclavicular line. Normal rate. Regular rhythm. Normal S1. Normal S2.       Murmurs: There is no murmur.      No gallop.  No click. No rub.   Pulses:     Intact distal pulses.   Edema:     Peripheral edema absent.   Abdominal:      Tenderness: There is no abdominal tenderness.   Musculoskeletal: Normal  range of motion.         General: No tenderness. Skin:     General: Skin is warm and dry.   Neurological:      General: No focal deficit present.      Mental Status: Alert and oriented to person, place and time.           ASSESSMENT AND PLAN    Clinical impression     1.  Cardiomyopathy with a left ventricular action fraction of 30% (September 2023).  Echocardiogram 2024 (November) left ventricular ejection fraction of 30 to 35%  2.  Congestive heart failure, [place] heart association class II  3.  Evidence of ischemia per stress test in 2023  4.  History of coronary artery disease-ST elevation myocardial infarction in 2006 status post PCI of the right coronary artery.  Status post cardiac catheterization with PCI June 2024  5.  Status post dual-chamber ICD implanted in January 2025 with no complications  6.  Ventricular tachycardia sustained terminated with ATP in February 2025  7.  Evidence of atrial fibrillation by device interrogation    Medical management    I have personally discussed the findings of the device interrogation.  Noted significant ventricular arrhythmias.  [place] of atrial fibrillation was still present but minimal.  Continue with Eliquis therapy.    Continue beta-blocker therapy.    Follow my office every 6 to 8 weeks or sooner if needed.    Will continue watching burden of ventricular arrhythmias by device interrogation.  We may have to decide about amiodarone therapy.    Patient has an appointment with cardiology service.  Patient is asking about level of exercise that he can achieve.  I will defer cardiac rehabilitation to cardiology service.    Follow device clinic as scheduled.    Risk factor modification and lifestyle modification discussed with patient. Diet , exercise and hydration discussed with patient.    I have personally review with patient during this office visit, laboratory data, echocardiogram results, stress test results, Holter-event monitor results prior and after the last  electrophysiology visit. All questions has been answered.    Please excuse any errors in grammar or translation related to this dictation.  Voice recognition software was utilized to prepare this document.      I, Dr. Hutton, personally performed the services described in the documentation as scribed by the nurse in my presence, and confirm it is both accurate and complete.              [1]   Past Medical History:  Diagnosis Date    Arrhythmia     Cancer (Multi)     Prostate    CHF (congestive heart failure)     Coronary artery disease     Hyperlipidemia     Hypertension     Ischemic cardiomyopathy     Right foot drop 02/17/2023    Stroke (Multi)    [2]   Social History  Tobacco Use    Smoking status: Never    Smokeless tobacco: Never   Vaping Use    Vaping status: Never Used   Substance Use Topics    Alcohol use: Not Currently     Alcohol/week: 1.0 standard drink of alcohol     Types: 1 Glasses of wine per week     Comment: on occ    Drug use: Never   [3]   Family History  Problem Relation Name Age of Onset    Heart disease Mother      Hypertension Other Family history    [4] No Known Allergies

## 2025-05-09 ENCOUNTER — APPOINTMENT (OUTPATIENT)
Dept: PRIMARY CARE | Facility: CLINIC | Age: 75
End: 2025-05-09
Payer: MEDICARE

## 2025-05-09 VITALS
HEIGHT: 69 IN | HEART RATE: 77 BPM | DIASTOLIC BLOOD PRESSURE: 72 MMHG | BODY MASS INDEX: 21.92 KG/M2 | TEMPERATURE: 96.2 F | WEIGHT: 148 LBS | SYSTOLIC BLOOD PRESSURE: 110 MMHG

## 2025-05-09 DIAGNOSIS — Z12.11 SCREENING FOR COLORECTAL CANCER: ICD-10-CM

## 2025-05-09 DIAGNOSIS — F41.9 ANXIETY: ICD-10-CM

## 2025-05-09 DIAGNOSIS — C61 PROSTATE CANCER (MULTI): ICD-10-CM

## 2025-05-09 DIAGNOSIS — Z00.00 ROUTINE GENERAL MEDICAL EXAMINATION AT HEALTH CARE FACILITY: Primary | ICD-10-CM

## 2025-05-09 DIAGNOSIS — Z12.5 SCREENING FOR PROSTATE CANCER: ICD-10-CM

## 2025-05-09 DIAGNOSIS — Z12.12 SCREENING FOR COLORECTAL CANCER: ICD-10-CM

## 2025-05-09 PROCEDURE — 1159F MED LIST DOCD IN RCRD: CPT | Performed by: INTERNAL MEDICINE

## 2025-05-09 PROCEDURE — 1170F FXNL STATUS ASSESSED: CPT | Performed by: INTERNAL MEDICINE

## 2025-05-09 PROCEDURE — G0439 PPPS, SUBSEQ VISIT: HCPCS | Performed by: INTERNAL MEDICINE

## 2025-05-09 PROCEDURE — 1036F TOBACCO NON-USER: CPT | Performed by: INTERNAL MEDICINE

## 2025-05-09 PROCEDURE — 3074F SYST BP LT 130 MM HG: CPT | Performed by: INTERNAL MEDICINE

## 2025-05-09 PROCEDURE — 3078F DIAST BP <80 MM HG: CPT | Performed by: INTERNAL MEDICINE

## 2025-05-09 RX ORDER — SERTRALINE HYDROCHLORIDE 50 MG/1
50 TABLET, FILM COATED ORAL DAILY
Qty: 30 TABLET | Refills: 11 | Status: SHIPPED | OUTPATIENT
Start: 2025-05-09 | End: 2026-05-04

## 2025-05-09 ASSESSMENT — ACTIVITIES OF DAILY LIVING (ADL)
BATHING: INDEPENDENT
JUDGMENT_ADEQUATE_SAFELY_COMPLETE_DAILY_ACTIVITIES: YES
FEEDING YOURSELF: INDEPENDENT
TAKING MEDICATION: INDEPENDENT
TOILETING: INDEPENDENT
DOING HOUSEWORK: INDEPENDENT
GROOMING: INDEPENDENT
HEARING - RIGHT EAR: FUNCTIONAL
PILL BOX USED: NO
ADEQUATE_TO_COMPLETE_ADL: YES
USING TRANSPORTATION: INDEPENDENT
MANAGING FINANCES: INDEPENDENT
ADEQUATE_TO_COMPLETE_ADL: YES
GROCERY SHOPPING: INDEPENDENT
FEEDING: INDEPENDENT
BATHING: INDEPENDENT
HEARING - LEFT EAR: FUNCTIONAL
JUDGMENT_ADEQUATE_SAFELY_COMPLETE_DAILY_ACTIVITIES: YES
PATIENT'S MEMORY ADEQUATE TO SAFELY COMPLETE DAILY ACTIVITIES?: YES
EATING: INDEPENDENT
DRESSING YOURSELF: INDEPENDENT
PREPARING MEALS: INDEPENDENT
NEEDS ASSISTANCE WITH FOOD: INDEPENDENT
WALKS IN HOME: INDEPENDENT
USING TELEPHONE: INDEPENDENT
TOILETING: INDEPENDENT
STIL DRIVING: YES
DRESSING: INDEPENDENT

## 2025-05-09 ASSESSMENT — PATIENT HEALTH QUESTIONNAIRE - PHQ9
1. LITTLE INTEREST OR PLEASURE IN DOING THINGS: NOT AT ALL
SUM OF ALL RESPONSES TO PHQ9 QUESTIONS 1 AND 2: 0
SUM OF ALL RESPONSES TO PHQ9 QUESTIONS 1 AND 2: 0
2. FEELING DOWN, DEPRESSED OR HOPELESS: NOT AT ALL
1. LITTLE INTEREST OR PLEASURE IN DOING THINGS: NOT AT ALL
2. FEELING DOWN, DEPRESSED OR HOPELESS: NOT AT ALL

## 2025-05-09 ASSESSMENT — ENCOUNTER SYMPTOMS
COLOR CHANGE: 0
CARDIOVASCULAR NEGATIVE: 1
GASTROINTESTINAL NEGATIVE: 1
ARTHRALGIAS: 0
DEPRESSION: 0
RESPIRATORY NEGATIVE: 1
OCCASIONAL FEELINGS OF UNSTEADINESS: 0
WEAKNESS: 0
LOSS OF SENSATION IN FEET: 0
DIZZINESS: 0
CONSTITUTIONAL NEGATIVE: 1

## 2025-05-09 NOTE — PROGRESS NOTES
Subjective   Reason for Visit: Mario Pringle is an 74 y.o. male here for a Medicare Wellness visit.     Past Medical, Surgical, and Family History reviewed and updated in chart.    Reviewed all medications by prescribing practitioner or clinical pharmacist (such as prescriptions, OTCs, herbal therapies and supplements) and documented in the medical record.    74-year-old male patient was seen for wellness exam.  He has a cardiac MRI done, ejection fraction was 34%, he qualified for ICD placement, ICD was placed about 2 months ago, it went well.  Patient lost his wife 4 years ago, he works part-time in a bank, recently he was trying to get involved in some relationship but that did not go well, he has a 2 children, he is very anxious about situation happening to him.  He has impaired systolic function without any overt heart failure.  He remains on SGLT2 inhibitor, Entresto, spironolactone.  He has been manifesting some sarcopenia, he needs to work on muscle build up and also some core development.  He has sustained a stroke, he has coronary artery disease with PCI, he has a prostate cancer, after having prostate cancer surgery he has been incontinent, he has been having a lot of anxiety lately, ICD went well area is healed, he came here for wellness exam, his independence has been maintained, he is able to sustain his mobility, he walks a lot, he has a living will, most of the vaccinations are up to date.        Patient Care Team:  Omkar Rodriguez MD as PCP - General  ROMARIO Carney as PCP - [facility] Attributed Provider  Juan Sharma MD as Consulting Physician (Cardiology)  ROMARIO Carney as Nurse Practitioner (Cardiology)  Aaron Hutton MD as Cardiologist (Electrophysiology)     Review of Systems   Constitutional: Negative.    HENT: Negative.     Respiratory: Negative.     Cardiovascular: Negative.    Gastrointestinal: Negative.    Genitourinary:         Incontinance   Musculoskeletal:   "Negative for arthralgias.   Skin:  Negative for color change.   Neurological:  Negative for dizziness and weakness.       Objective   Vitals:  Temp 35.7 °C (96.2 °F) (Temporal)   Ht 1.746 m (5' 8.75\")   Wt 67.1 kg (148 lb)   BMI 22.02 kg/m²       Physical Exam  Constitutional:       Appearance: Normal appearance. He is normal weight.   HENT:      Head: Normocephalic.   Eyes:      Conjunctiva/sclera: Conjunctivae normal.   Cardiovascular:      Rate and Rhythm: Normal rate and regular rhythm.      Pulses: Normal pulses.      Heart sounds: Normal heart sounds.   Pulmonary:      Effort: Pulmonary effort is normal.      Breath sounds: Normal breath sounds.   Abdominal:      General: Abdomen is flat.      Palpations: Abdomen is soft.   Musculoskeletal:         General: Normal range of motion.      Cervical back: Neck supple.   Skin:     General: Skin is warm and dry.      Comments: Actinic keratosis on scalp   Neurological:      General: No focal deficit present.      Mental Status: He is oriented to person, place, and time. Mental status is at baseline.   Psychiatric:         Mood and Affect: Mood normal.         Assessment & Plan  Routine general medical examination at health care facility    Orders:    1 Year Follow Up In Primary Care - Wellness Exam; Future    Screening for colorectal cancer    Orders:    Colonoscopy Screening; Average Risk Patient; Future    Screening for prostate cancer    Orders:    PSA screen; Future    Anxiety    Orders:    sertraline (Zoloft) 50 mg tablet; Take 1 tablet (50 mg) by mouth once daily.  He is asking me about sertraline, we can give sertraline, we can start with 50 mg dosage.  We can go on the 50 mg dosage for now.  We can do some core exercises, walking is excellent exercise, working part-time has been helping him, he has a 2 children living close by.  Area of ICD reviewed healing well.  Previously has a bad herpes zoster which led to significant postherpetic neuralgia in the " right forearm and right hand.  Bladder cancer is in remission.  We can go for 1 last colonoscopy, PSA will be done to make sure that prostate cancer remains in remission, he lives independently, he has a good support from children, ADLs and IADLs are intact, he sleeps well, he is looking to have a bluelight treatment for actinic keratosis and there are some side effects that it can cause short-term memory problem, he is asking my opinion I told him that we need to know the percentage of this side effect if it is not very common side effect he can go for it but we need to know the incidence of the side effect.  Complete physical exam was done, hydration could be better, walking is excellent exercise for cognitive functions, for better sleep and also for bone strength.  He struggles lately, he is going to do well as much as he can, he is struggling to retire completely, since his wife passed away he has been having this anxiety problems.  He will return back in a year, in between any issues or concerns he will call us, went through the report, information, laboratories and interim events.  Prostate cancer (Multi)

## 2025-05-14 ENCOUNTER — APPOINTMENT (OUTPATIENT)
Dept: CARDIOLOGY | Facility: CLINIC | Age: 75
End: 2025-05-14
Payer: MEDICARE

## 2025-05-14 VITALS
HEIGHT: 69 IN | HEART RATE: 90 BPM | SYSTOLIC BLOOD PRESSURE: 100 MMHG | WEIGHT: 150 LBS | BODY MASS INDEX: 22.22 KG/M2 | DIASTOLIC BLOOD PRESSURE: 60 MMHG

## 2025-05-14 DIAGNOSIS — Z78.9 NEVER SMOKED TOBACCO: ICD-10-CM

## 2025-05-14 DIAGNOSIS — I47.20 VT (VENTRICULAR TACHYCARDIA) (MULTI): ICD-10-CM

## 2025-05-14 DIAGNOSIS — I42.0 DILATED CARDIOMYOPATHY (MULTI): ICD-10-CM

## 2025-05-14 DIAGNOSIS — Z95.810 ICD (IMPLANTABLE CARDIOVERTER-DEFIBRILLATOR) IN PLACE: Primary | ICD-10-CM

## 2025-05-14 PROCEDURE — 3074F SYST BP LT 130 MM HG: CPT | Performed by: INTERNAL MEDICINE

## 2025-05-14 PROCEDURE — 93000 ELECTROCARDIOGRAM COMPLETE: CPT | Mod: DISTINCT PROCEDURAL SERVICE | Performed by: INTERNAL MEDICINE

## 2025-05-14 PROCEDURE — 99214 OFFICE O/P EST MOD 30 MIN: CPT | Performed by: INTERNAL MEDICINE

## 2025-05-14 PROCEDURE — 1159F MED LIST DOCD IN RCRD: CPT | Performed by: INTERNAL MEDICINE

## 2025-05-14 PROCEDURE — 1036F TOBACCO NON-USER: CPT | Performed by: INTERNAL MEDICINE

## 2025-05-14 PROCEDURE — 3078F DIAST BP <80 MM HG: CPT | Performed by: INTERNAL MEDICINE

## 2025-05-14 ASSESSMENT — ENCOUNTER SYMPTOMS
PALPITATIONS: 0
DYSPNEA ON EXERTION: 0

## 2025-05-14 NOTE — PROGRESS NOTES
CARDIOLOGY OFFICE VISIT      CHIEF COMPLAINT  Chief Complaint   Patient presents with    Follow-up     Pt. Present today for 6 month follow up with device check        HISTORY OF PRESENT ILLNESS  HPI  74-year-old male with a history of atherosclerotic heart disease with remote STEMI in November 2006. He underwent angioplasty and stenting of the RCA at that time. An echocardiogram in 2006 showed inferolateral wall hypokinesis and estimated LV ejection fraction of 45%. Aortic valve appeared to be bicuspid. A stress myocardial perfusion study showed mild inferior wall myocardial ischemia versus shifting diaphragmatic attenuation artifact. Calculated LV ejection fraction 44%. He has a history of hypertension, hyperlipidemia, and carotid artery disease.      In September 2023, a repeat echocardiogram showed an estimated LV ejection fraction of 30% with severe hypokinesis of the inferior and inferolateral walls. The distal anterior wall was hypokinetic. Right ventricular chamber size and function are normal. Valvular structure and function were normal. Carotid ultrasound October 5, 2023 showed 50 to 69% stenosis in the right proximal internal carotid artery and greater than 50% stenosis of the right external carotid artery. There was 50 to 69% stenosis of the left internal carotid artery. A myocardial perfusion study on December 4, 2023 showed moderate inferolateral myocardial infarction extends from apex to base. No evidence of myocardial ischemia by perfusion imaging. There was severe LV dysfunction with calculated LV ejection fraction of 29%. Moderate to high risk study though no significant ischemia identified.         Stress test December 2023  IMPRESSION:  Abnormal exercise Myoview cardiac perfusion stress test.  Moderate inferolateral myocardial infarction extending from apex to  base. No evidence of ischemia or myocardial infarction by perfusion  imaging. Severe left ventricular systolic dysfunction,  ejection  fraction 29%. No exercise provoked significant ischemic ECG changes  or chest pain symptoms. Noninvasive risk stratification: Moderate to  high risk though no significant ischemia identified. No previous  available for comparison.     Echocardiogram October 2023     CONCLUSIONS:   1. Left ventricular systolic function is severely decreased with a 30% estimated ejection fraction.   2. There is severe hypokinesis of inferior and inferolateral walls. The basal portion of inferior wall, akinetic. The distal anterior wall is mildly hyopkinetic as is distal anterolateral wall. There is some post extrasystolic potentiation and frequent pvc's.   3. Normal RV size and function      RVSP could not be calculated as no TR doppler envelope.   4. Left ventricular cavity size is moderately dilated.   5. Normal valve structure and function.   6. There are multiple wall motion abnormalities.  Patient states that so far he has been doing well.  He denies any symptoms of chest pain or shortness of palpitations.     Echocardiogram 5/2024  CONCLUSIONS:   1. Left ventricular systolic function is moderately to severely decreased with a 25-30% estimated ejection fraction.   2. There is severe hypokinesis of the majority the myocardium except for the interventricular septum and apical aspect of the septum which are only mildly hypokinetic. No apical thrombi identified. Study similar to that of October 2023.   3. Normal RV size and function      Mild pulm hypertension RVSP 33 mmHg.   4. Left ventricular cavity size is moderate to severely dilated.   5. Normal valve structure and function.   6. There is global hypokinesis of the left ventricle with minor regional variations.     Due to worsening cardiomyopathy, a cardiac  catheterization was recommended during the last office visit in May 2024.     Catheterization June 2024  CONCLUSIONS:   1. Left Main Coronary Artery: This artery is normal.   2. Left Anterior Descending Artery:  presents luminal irregularities and contains patent previously placed stents.   3. Distal LAD Lesion: The percent stenosis is 70%.   4. Circumflex Coronary Artery: presents luminal irregularities and contains patent previously placed stents.   5. Right Coronary Artery: presents luminal irregularities, significantly obstructed and contains patent previously placed stents.   6. Mid RCA Lesion: The percent stenosis is 80%.   7. Mid RCA Lesion: pre-dilation, [place] 4.00x26 post-dilation: 0% residual stenosis. RCA: pre-procedure TERRA flow was 3(complete perfusion) and post-procedure TERRA flow was 3(complete perfusion).   8. The Left Ventricular Ejection Fraction is 25%.     Cardiac MRI was performed July 2024.     Cardiac MRI July 2024  IMPRESSION:  1. Normal left ventricular cavity size with severely depressed  systolic function. Ejection fraction 34%.  2. Delayed enhancement imaging reveals non transmural subendocardial  myocardial infarction of the basal inferior wall, and basal inferior  septum. Transmural myocardial infarction of the mid inferior wall,  and mid inferior septum. Non transmural subendocardial myocardial  infarction of the basal/mid inferolateral wall. Non transmural  subendocardial myocardial infarction of the distal inferior wall.  3. Asymmetric air septal left ventricular hypertrophy. Basal anterior  septum 1.2 cm.  4. Normal right ventricular cavity size with preserved systolic  function. RV EF 63%.     Echocardiogram November 2024  CONCLUSIONS:   1. The left ventricular systolic function is moderately decreased, with a visually estimated ejection fraction of 30-35%.   2. There is global hypokinesis of the left ventricle with minor regional variations.   3. Left ventricular cavity size is mildly dilated.   4. On [id number] comparison with the study of May 2024 LV chamber dimension is not quite as dilated. Overall ejection fraction appears slightly improved still less than 35%. There  remains akinesis of the inferior inferolateral wall. Anterolateral wall is still hypokinetic but significantly improved in comparison to prior study.     Due to persistent cardiomyopathy, patient was evaluated in December 2024 in my office.  Patient underwent dual-chamber ICD implantation Medtronic  in January 7, 2025 with no complications.     Patient had a device transmission in February 25, 2025. Device has 12 years 5 months of longevity. There was an episode on February 2, 2025 at 10:36 AM. Patient had an episode of ventricular tachycardia at a rate of 222 bpm. ATP x 1 terminated this arrhythmia. He also had episodes of atrial fibrillation with burden of atrial fibrillation 1% with the longest duration 5 hours 25 minutes.     Patient underwent a cardiac catheterization March 2025     Cardiac cath 03/2025  CONCLUSIONS:   1. The entire Left Main: 0% stenosis.   2. Left Anterior Descending Artery: contains patent previously placed stents.   3. Proximal LAD Lesion: The percent stenosis is 30%.   4. Distal LAD Lesion: The percent stenosis is 80%.   5. Circumflex Coronary Artery: contains patent previously placed stents.   6. Distal CX Lesion: The percent stenosis is 30%.   7. Right Coronary Artery: contains patent previously placed stents.   8. Mid RCA Lesion: The percent stenosis is 10-30%.   9. Prox PDA RCA Lesion: The percent stenosis is 50 %.    Since the last office visit he has been doing well.  Denies any symptoms of chest pain or shortness breath or palpitations.    EKG performed today shows sinus rhythm rate of 90 bpm QRS of 92 ms QT corrected 447 ms.  Rhythm strip shows the same pattern.    Patient had a device interrogation in April 2025.  Patient does have a dual-chamber ICD Medtronic with battery [employer] 12 years.  [place] of atrial ration 0.5% of the time with the longest duration 5.25 hours.  No ventricular events noted.      Past Medical History  Medical History[1]    Social History  Social  History[2]    Family History   Family History[3]     Allergies:  RX Allergies[4]     Outpatient Medications:  Current Outpatient Medications   Medication Instructions    apixaban (ELIQUIS) 5 mg, oral, 2 times daily    atorvastatin (LIPITOR) 80 mg, oral, Daily    clopidogrel (PLAVIX) 75 mg, oral, Daily    empagliflozin (JARDIANCE) 10 mg, oral, Daily    ezetimibe (ZETIA) 10 mg, oral, Daily    fish oil concentrate (Omega-3) 120-180 mg capsule 1 capsule, Daily    metoprolol succinate XL (TOPROL-XL) 25 mg, oral, Daily    multivitamin with minerals tablet 1 tablet, Daily    nitroglycerin (NITROSTAT) 0.4 mg, sublingual, Every 5 min PRN, May repeat every 5 minutes for up to 3 doses.    sacubitriL-valsartan (Entresto) 24-26 mg tablet 1 TABLET TWICE A DAY    sertraline (ZOLOFT) 50 mg, oral, Daily    spironolactone (ALDACTONE) 12.5 mg, oral, Daily          REVIEW OF SYSTEMS  Review of Systems   Cardiovascular:  Negative for chest pain, dyspnea on exertion and palpitations.   All other systems reviewed and are negative.        VITALS  There were no vitals filed for this visit.    PHYSICAL EXAM  Constitutional:       General: Awake.      Appearance: Normal and healthy appearance. Well-developed and not in distress.   Neck:      Vascular: No JVR. JVD normal.   Pulmonary:      Effort: Pulmonary effort is normal.      Breath sounds: Normal breath sounds. No wheezing. No rhonchi. No rales.   Chest:      Chest wall: Not tender to palpatation.      Comments: Left sided device pocket- healed and well approximated. No swelling or hematoma      Cardiovascular:      PMI at left midclavicular line. Normal rate. Regular rhythm. Normal S1. Normal S2.       Murmurs: There is no murmur.      No gallop.  No click. No rub.   Pulses:     Intact distal pulses.   Edema:     Peripheral edema absent.   Abdominal:      Tenderness: There is no abdominal tenderness.   Musculoskeletal: Normal range of motion.         General: No tenderness. Skin:      General: Skin is warm and dry.   Neurological:      General: No focal deficit present.      Mental Status: Alert and oriented to person, place and time.           ASSESSMENT AND PLAN      Clinical impression     1.  Cardiomyopathy with a left ventricular action fraction of 30% (September 2023).  Echocardiogram 2024 (November) left ventricular ejection fraction of 30 to 35%  2.  Congestive heart failure, [place] heart association class II  3.  Evidence of ischemia per stress test in 2023  4.  History of coronary artery disease-ST elevation myocardial infarction in 2006 status post PCI of the right coronary artery.  Status post cardiac catheterization with PCI June 2024  5.  Status post dual-chamber ICD implanted in January 2025 with no complications  6.  Ventricular tachycardia sustained terminated with ATP in February 2025  7.  Evidence of atrial fibrillation by device interrogation    Plan recommendation    So far patient is doing well from the electrophysiology standpoint.  No significant ventricular arrhythmias.  [place] of atrial fibrillation 0.5% of the time.  Asymptomatic.  Will continue with current medical therapy that includes Eliquis and beta-blocker therapy.    Follow device clinic as scheduled    From my office every 6 months or sooner if needed.    Risk factor modification and lifestyle modification discussed with patient. Diet , exercise and hydration discussed with patient.    I have personally review with patient during this office visit, laboratory data, echocardiogram results, stress test results, Holter-event monitor results prior and after the last electrophysiology visit. All questions has been answered.    Please excuse any errors in grammar or translation related to this dictation.  Voice recognition software was utilized to prepare this document.         I, Dr. Hutton, personally performed the services described in the documentation as scribed by the nurse in my presence, and confirm it is both  accurate and complete.            [1]   Past Medical History:  Diagnosis Date    Hypertension     Right foot drop 02/17/2023   [2]   Social History  Tobacco Use    Smoking status: Never    Smokeless tobacco: Never   Vaping Use    Vaping status: Never Used   Substance Use Topics    Alcohol use: Not Currently     Alcohol/week: 1.0 standard drink of alcohol     Types: 1 Glasses of wine per week     Comment: on occ    Drug use: Never   [3]   Family History  Problem Relation Name Age of Onset    Heart disease Mother      Hypertension Other Family history    [4] No Known Allergies

## 2025-05-14 NOTE — PATIENT INSTRUCTIONS
Continue same medications/treatment.  Patient educated on proper medication use.  Patient educated on risk factor modification.  Please bring any lab results from other providers/physicians to your next appointment.    Please bring all medicines, vitamins, and herbal supplements with you when you come to the office.    Prescriptions will not be filled unless you are compliant with your follow up appointments or have a follow up appointment scheduled as per instruction of your physician. Refills should be requested at the time of your visit.    Follow up with our physician assistant, Margret, in 6 months with device check   Continue remote checks at 3 and 9 months    Lizzie MONTERO RN, AM SCRIBING FOR, AND IN THE PRESENCE OF DR. FELIPE ANDERSON MD

## 2025-05-16 ENCOUNTER — OFFICE VISIT (OUTPATIENT)
Dept: URGENT CARE | Age: 75
End: 2025-05-16
Payer: MEDICARE

## 2025-05-16 VITALS
HEIGHT: 69 IN | RESPIRATION RATE: 18 BRPM | WEIGHT: 150 LBS | BODY MASS INDEX: 22.22 KG/M2 | OXYGEN SATURATION: 98 % | TEMPERATURE: 97.6 F | SYSTOLIC BLOOD PRESSURE: 101 MMHG | DIASTOLIC BLOOD PRESSURE: 63 MMHG | HEART RATE: 87 BPM

## 2025-05-16 DIAGNOSIS — S51.812A SKIN TEAR OF LEFT FOREARM WITHOUT COMPLICATION, INITIAL ENCOUNTER: Primary | ICD-10-CM

## 2025-05-16 DIAGNOSIS — S51.802A AVULSION OF SKIN OF LEFT FOREARM, INITIAL ENCOUNTER: ICD-10-CM

## 2025-05-16 RX ORDER — MUPIROCIN 20 MG/G
OINTMENT TOPICAL
Qty: 22 G | Refills: 0 | Status: SHIPPED | OUTPATIENT
Start: 2025-05-16 | End: 2025-05-26

## 2025-05-16 NOTE — PROGRESS NOTES
"Subjective   Patient ID: Mario Pringle is a 74 y.o. male. They present today with a chief complaint of Hit L arm on car door this am.    History of Present Illness  Mr. Pringle is a 74-year-old male who is anticoagulated with Eliquis and also takes Plavix. Earlier today, he sustained a skin tear to his left forearm while he was closing his car door. He states that he has had difficulty controlling the bleeding. He denies current pain. There were no other injuries sustained, and his tetanus immunization was updated in 2024.     Review of Systems   Constitutional:  Denies fever, chills, malaise, fatigue  Respiratory:  Denies shortness of breath,  cough, wheezing sputum production  Cardiovascular:  Denies chest pain, palpitations, lightheadedness, dizziness, syncope.    Musculoskeletal:  Denies decreased range of motion, lower extremity swelling, arthralgia, myalgia, back pain   Integumentary:  See HPI    Neurologic:  Denies numbness, weakness, paresthesia    All other systems are negative        History provided by:  Patient   used: No        Past Medical History  Allergies as of 05/16/2025    (No Known Allergies)       Prescriptions Prior to Admission[1]     Medical History[2]    Surgical History[3]     reports that he has never smoked. He has never used smokeless tobacco. He reports that he does not currently use alcohol after a past usage of about 1.0 standard drink of alcohol per week. He reports that he does not use drugs.    Review of Systems  Review of Systems                               Objective    Vitals:    05/16/25 1337   BP: 101/63   Pulse: 87   Resp: 18   Temp: 36.4 °C (97.6 °F)   SpO2: 98%   Weight: 68 kg (150 lb)   Height: 1.753 m (5' 9\")     No LMP for male patient.    Physical Exam  Vitals and nursing note reviewed.   Constitutional:       General: He is not in acute distress.     Appearance: Normal appearance. He is normal weight. He is not ill-appearing, toxic-appearing or " diaphoretic.   Cardiovascular:      Rate and Rhythm: Normal rate.      Pulses: Normal pulses.   Pulmonary:      Effort: Pulmonary effort is normal. No respiratory distress.      Breath sounds: No stridor.   Musculoskeletal:         General: No swelling, tenderness, deformity or signs of injury. Normal range of motion.   Skin:     General: Skin is warm and dry.      Capillary Refill: Capillary refill takes less than 2 seconds.      Coloration: Skin is not ashen, cyanotic, jaundiced, mottled, pale or sallow.      Findings: Abscess, signs of injury, laceration and wound present. No abrasion, bruising, ecchymosis, erythema or rash.      Comments: 1.5 cm by 1 centimeter area of almost tissue was present on the dorsal aspect of the mid forearm with oozing blood. There is no evidence of foreign body or contamination.    Neurological:      General: No focal deficit present.      Mental Status: He is alert and oriented to person, place, and time.         Laceration Repair    Date/Time: 5/16/2025 2:48 PM    Performed by: ROMARIO Villagomez  Authorized by: ROMARIO Villagomez    Consent:     Consent obtained:  Verbal    Consent given by:  Patient    Risks, benefits, and alternatives were discussed: yes      Risks discussed:  Infection, pain, poor cosmetic result and need for additional repair  Universal protocol:     Procedure explained and questions answered to patient or proxy's satisfaction: yes      Patient identity confirmed:  Verbally with patient  Anesthesia:     Anesthesia method:  None  Laceration details:     Location:  Shoulder/arm    Shoulder/arm location:  L lower arm    Length (cm):  1.5  Pre-procedure details:     Preparation:  Patient was prepped and draped in usual sterile fashion  Exploration:     Limited defect created (wound extended): no      Hemostasis achieved with:  Direct pressure    Imaging outcome: foreign body not noted      Wound exploration: wound explored through full range of motion       Contaminated: no    Treatment:     Area cleansed with:  Soap and water    Amount of cleaning:  Standard    Irrigation solution:  Sterile saline    Debridement:  None    Undermining:  None  Skin repair:     Repair method: Gelfoam.  Repair type:     Repair type:  Simple  Post-procedure details:     Dressing:  Antibiotic ointment and non-adherent dressing    Procedure completion:  Tolerated  Comments:      Wound closure was not possible due to presence of avulsed skin on the left forearm. Bleeding was controlled prior to application of gel foam, however it was placed to prevent bleeding after his discharge. Hemostasis was achieved. There were two sterile 2 by twos placed over top the gel foam and his forearm was wrapped with elastic bandage.       Point of Care Test & Imaging Results from this visit  No results found for this visit on 05/16/25.   Imaging  No results found.    Cardiology, Vascular, and Other Imaging  ECG 12 lead (Clinic Performed)  Result Date: 5/14/2025  EKG performed today shows sinus rhythm rate of 90 bpm QRS of 92 ms QT corrected 447 ms.  Rhythm strip shows the same pattern.        Diagnostic study results (if any) were reviewed by ROMARIO Villagomez.    Assessment/Plan   Allergies, medications, history, and pertinent labs/EKGs/Imaging reviewed by ROMARIO Villagomez.     Medical Decision Making    Patient presented with a tissue avulsion to the left forearm. He is hemodynamically stable and in no acute distress. Tetanus immunization is current. Hemostasis was achieved with direct pressure and use of gel foam, as documented in the procedure note. He was instructed regarding wound care and understands not to remove the gel foam until it has completely dissolved. Topical antibiotic ointment was applied with the sterile dressing and a prescription for nmupirocin was sent to his pharmacy. Instructed to follow up with his PCP or return to this clinic if new symptoms develop or if he has  any signs or symptoms of infection. He verbalized understanding and states he will follow up if necessary.     Orders and Diagnoses  There are no diagnoses linked to this encounter.    Medical Admin Record      Patient disposition: Home    Electronically signed by ROMARIO Villagomez  2:24 PM           [1] (Not in a hospital admission)  [2]   Past Medical History:  Diagnosis Date    Hypertension     Right foot drop 02/17/2023   [3]   Past Surgical History:  Procedure Laterality Date    CARDIAC CATHETERIZATION N/A 6/6/2024    Procedure: Left Heart Cath, With LV;  Surgeon: Stevo Nieves MD;  Location: [facility];  Service: Cardiovascular;  Laterality: N/A;    CARDIAC CATHETERIZATION N/A 6/6/2024    Procedure: PCI KRISTIN Stent- Coronary;  Surgeon: Stevo Nieves MD;  Location: [facility];  Service: Cardiovascular;  Laterality: N/A;    CARDIAC CATHETERIZATION N/A 3/11/2025    Procedure: Left Heart Cath with Coronary Angiography and LV;  Surgeon: Alfonso Woody MD;  Location: [facility];  Service: Cardiovascular;  Laterality: N/A;  cath to be done next week in [place] with Dr. Nieves or Dr. Woody. Labs to be done prior. Hold Jardiance 3 days prior.    CARDIAC ELECTROPHYSIOLOGY PROCEDURE Left 1/7/2025    Procedure: ICD Dual Implant;  Surgeon: Aaron Hutton MD;  Location: [facility];  Service: Electrophysiology;  Laterality: Left;  hold Jardiance 7 days prior to    CORONARY ANGIOPLASTY      US ASPIRATION INJECTION INTERMEDIATE JOINT  03/16/2022    US ASPIRATION INJECTION INTERMEDIATE JOINT 3/16/2022 [facility]

## 2025-05-19 DIAGNOSIS — I25.10 CAD S/P PERCUTANEOUS CORONARY ANGIOPLASTY: ICD-10-CM

## 2025-05-19 DIAGNOSIS — Z98.61 CAD S/P PERCUTANEOUS CORONARY ANGIOPLASTY: ICD-10-CM

## 2025-05-19 RX ORDER — CLOPIDOGREL BISULFATE 75 MG/1
75 TABLET ORAL DAILY
Qty: 90 TABLET | Refills: 0 | Status: SHIPPED | OUTPATIENT
Start: 2025-05-19

## 2025-06-26 ENCOUNTER — APPOINTMENT (OUTPATIENT)
Dept: CARDIOLOGY | Facility: CLINIC | Age: 75
End: 2025-06-26
Payer: MEDICARE

## 2025-06-26 VITALS
WEIGHT: 148.2 LBS | BODY MASS INDEX: 21.95 KG/M2 | HEART RATE: 103 BPM | SYSTOLIC BLOOD PRESSURE: 88 MMHG | DIASTOLIC BLOOD PRESSURE: 46 MMHG | HEIGHT: 69 IN

## 2025-06-26 DIAGNOSIS — Z98.61 HX OF PERCUTANEOUS TRANSLUMINAL CORONARY ANGIOPLASTY: ICD-10-CM

## 2025-06-26 DIAGNOSIS — I15.9 SECONDARY HYPERTENSION: ICD-10-CM

## 2025-06-26 DIAGNOSIS — I25.10 ATHEROSCLEROSIS OF NATIVE CORONARY ARTERY OF NATIVE HEART WITHOUT ANGINA PECTORIS: ICD-10-CM

## 2025-06-26 DIAGNOSIS — E78.5 DYSLIPIDEMIA: ICD-10-CM

## 2025-06-26 DIAGNOSIS — I50.22 CHRONIC SYSTOLIC HEART FAILURE: ICD-10-CM

## 2025-06-26 DIAGNOSIS — Z78.9 NEVER SMOKED TOBACCO: ICD-10-CM

## 2025-06-26 DIAGNOSIS — I10 PRIMARY HYPERTENSION: ICD-10-CM

## 2025-06-26 DIAGNOSIS — I25.10 CAD S/P PERCUTANEOUS CORONARY ANGIOPLASTY: ICD-10-CM

## 2025-06-26 DIAGNOSIS — I25.5 CARDIOMYOPATHY, ISCHEMIC: ICD-10-CM

## 2025-06-26 DIAGNOSIS — I25.2 OLD INFERIOR WALL MYOCARDIAL INFARCTION: ICD-10-CM

## 2025-06-26 DIAGNOSIS — Z51.89 ENCOUNTER FOR MEDICATION ADJUSTMENT: ICD-10-CM

## 2025-06-26 DIAGNOSIS — R07.9 CHEST PAIN: ICD-10-CM

## 2025-06-26 DIAGNOSIS — Z98.61 CAD S/P PERCUTANEOUS CORONARY ANGIOPLASTY: ICD-10-CM

## 2025-06-26 PROCEDURE — 3074F SYST BP LT 130 MM HG: CPT | Performed by: INTERNAL MEDICINE

## 2025-06-26 PROCEDURE — 1159F MED LIST DOCD IN RCRD: CPT | Performed by: INTERNAL MEDICINE

## 2025-06-26 PROCEDURE — 1036F TOBACCO NON-USER: CPT | Performed by: INTERNAL MEDICINE

## 2025-06-26 PROCEDURE — 99214 OFFICE O/P EST MOD 30 MIN: CPT | Performed by: INTERNAL MEDICINE

## 2025-06-26 PROCEDURE — 3078F DIAST BP <80 MM HG: CPT | Performed by: INTERNAL MEDICINE

## 2025-06-26 RX ORDER — METOPROLOL SUCCINATE 25 MG/1
25 TABLET, EXTENDED RELEASE ORAL DAILY
Qty: 90 TABLET | Refills: 3 | Status: SHIPPED | OUTPATIENT
Start: 2025-06-26 | End: 2026-06-26

## 2025-06-26 RX ORDER — SPIRONOLACTONE 25 MG/1
12.5 TABLET ORAL DAILY
Qty: 45 TABLET | Refills: 3 | Status: SHIPPED | OUTPATIENT
Start: 2025-06-26 | End: 2026-06-26

## 2025-06-26 RX ORDER — ASPIRIN 81 MG/1
81 TABLET ORAL DAILY
Start: 2025-06-26 | End: 2026-06-26

## 2025-06-26 RX ORDER — NITROGLYCERIN 0.4 MG/1
0.4 TABLET SUBLINGUAL EVERY 5 MIN PRN
Qty: 25 TABLET | Refills: 3 | Status: SHIPPED | OUTPATIENT
Start: 2025-06-26

## 2025-06-26 NOTE — PROGRESS NOTES
Patient:  Mario Pringle  YOB: 1950  MRN: [id number]     Chief Complaint   Patient presents with    Follow-up     Presents today for follow up of ICD/VT         HPI:      Mario Pringle is a 74 y.o. male who returns today for cardiac follow-up.  He has a history of atherosclerotic heart disease with remote STEMI in November 2006. He underwent angioplasty and stenting of the RCA at that time. An echocardiogram in 2006 showed inferolateral wall hypokinesis and an estimated LV ejection fraction of 45%. Aortic valve appeared to be bicuspid. A stress myocardial perfusion study showed mild inferior wall myocardial ischemia versus shifting diaphragmatic attenuation artifact. Calculated LV ejection fraction 44%. He has a history of hypertension, hyperlipidemia, and carotid artery disease. He has been followed on a regular basis by Dr. Berry Childress. He is maintained on aspirin, atorvastatin, and Toprol-XL.    A follow-up echocardiogram November 25, 2024 showed an estimated LV ejection fraction of 30% with severe hypokinesis of the inferior and inferolateral walls.  The distal anterior wall was hypokinetic.  Right ventricular chamber size and function normal.  Valvular structure and function were normal.  Carotid ultrasound October 5, 2023 showed 50 to 69% stenosis in the right proximal internal carotid artery and greater than 50% stenosis of the right external carotid artery.  There was 50 to 69% stenosis of the left internal carotid artery.  A myocardial perfusion study on December 4, 2023 showed moderate inferolateral myocardial infarction extending from apex to base.  No evidence of myocardial ischemia by perfusion imaging.  There was severe LV dysfunction and a calculated LV ejection fraction of 29%.  Moderate to high risk study though no significant ischemia identified.    It appeared that he likely had an extensive inferior myocardial infarction sometime ago.  No ischemia was identified on the  stress test and he was not having any active anginal or CHF symptoms.  He was continued on Toprol-XL.  He was started on Entresto 24/26 mg twice daily and Jardiance 10 mg daily.  He had a LifeVest placed.  Entresto was subsequently increased to 49/51 mg twice daily.  Cardiac catheterization by Dr. Nieves June 6, 2024 showed normal left main trunk, patent LAD stents, 70% stenosis of the distal LAD, patent circumflex stents with mild luminal irregularities, 80% stenosis of the mid RCA.  He underwent angioplasty and stenting of the RCA.  Estimated LV ejection fraction was 25%.      He was seen by Rosalva Haynes CNP on July 9, 2024.   He was doing well but noted some low blood pressures after taking his morning medications.  He noted some associated lightheadedness.  His blood pressure in the office was 82/50.  Toprol-XL was decreased to 25 mg daily and Entresto was decreased to 24/26 mg twice daily.   A cardiac MRI July 23, 2024 showed a calculated LV ejection fraction of 34%.  There was a nontransmural subendocardial myocardial infarction involving the basal inferior wall and basal inferior septal segment.  RV ejection fraction 63%.    Limited echo November 25, 2024 showed an estimated LV ejection fraction 30 to 35%.    He underwent dual chamber ICD implant by Dr. Aaron Hutton on January 7, 2025 ([employer]onic dual-chamber ICD cobalt XT DR CONNELLY).  He was seen in follow-up Rosalva Haynes January 13, 2025.  Do he was noted to be doing ing well with exception of persistently low blood pressure readings.  We have been limited in uptitrating his medications secondary to persistent low blood pressure readings.  He had a device transmission on 725, 2025.  It was noted that on February 2, 2025 he had an episode of ventricular tachycardia at a rate of 222 beats per minute.  This was terminated with ATP x 1.  He also had an episode of atrial fibrillation with the longest duration 5 hours and 25 minutes.  Total burden approximately 1%.   Repeat cardiac catheterization by Dr. Woody on March 11, 2025 showed 80% stenosis of the distal LAD, 50% stenosis of the proximal PDA of the RCA, and otherwise mild diffuse disease.  Patent LAD, circumflex, and RCA stents.  Repeat device interrogation April 16, 2025 showed normal function with burden of atrial fibrillation 0.5%.  No ventricular events.    He generally has been doing well since his last visit.  He continues to work part-time as  for [employer].  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  He is without any specific cardiovascular related complaints.  He does note quite extensive bruising and states sometimes it takes quite a while for small bleeds to stop.  He was advised to change from Plavix to aspirin 81 mg daily.  He will continue on Eliquis.  We discussed sideration of Watchman device implant if he has persistent issues with bleeding.    He remains very active without limitations.  He denies any chest pain or shortness of breath. He denies any orthopnea, PND, or increasing peripheral edema. He denies any palpitations, lightheadedness, near-syncope, or syncope. He denies any fever, chills, or cough. He denies any nausea, vomiting, or diaphoresis. He denies any hemoptysis, hematemesis, melena, or hematochezia. Lab studies March 11, 2025 showed a normal CBC and BMP except for glucose 149.   Lipid profile December 23, 2024 showed a cholesterol 119 with HDL 50, triglycerides 64, and LDL 57.  Other details as noted below.      Objective:     Vitals:    06/26/25 0819   BP: (!) 88/46   Pulse: 103       Wt Readings from Last 4 Encounters:   05/16/25 68 kg (150 lb)   05/14/25 68 kg (150 lb)   05/09/25 67.1 kg (148 lb)   04/16/25 67.7 kg (149 lb 3.2 oz)       Allergies:     No Known Allergies       Medications:     Current Outpatient Medications   Medication Instructions    apixaban (ELIQUIS) 5 mg, oral, 2 times daily     atorvastatin (LIPITOR) 80 mg, oral, Daily    clopidogrel (PLAVIX) 75 mg, oral, Daily    empagliflozin (JARDIANCE) 10 mg, oral, Daily    ezetimibe (ZETIA) 10 mg, oral, Daily    fish oil concentrate (Omega-3) 120-180 mg capsule 1 capsule, Daily    metoprolol succinate XL (TOPROL-XL) 25 mg, oral, Daily    multivitamin with minerals tablet 1 tablet, Daily    nitroglycerin (NITROSTAT) 0.4 mg, sublingual, Every 5 min PRN, May repeat every 5 minutes for up to 3 doses.    sacubitriL-valsartan (Entresto) 24-26 mg tablet 1 TABLET TWICE A DAY    sertraline (ZOLOFT) 50 mg, oral, Daily    spironolactone (ALDACTONE) 12.5 mg, oral, Daily       Physical Examination:   GENERAL:  Well developed, well nourished, in no acute distress.  CHEST:  Symmetric and nontender.  The device is noted.  NEURO/PSYCH:  Alert and oriented times three with approppriate behavior and responses.  NECK:  Supple, no JVD, no bruit.  LUNGS:  Clear to auscultation bilaterally, normal respiratory effort.  HEART:  Rate and rhythm regular with I/VI TAMIKA LSB; no gallop appreciated.        There are no rubs, clicks or heaves.  EXTREMITIES:  Warm with good color, no clubbing or cyanosis.  There is no edema noted.  PERIPHERAL VASCULAR:  Pulses present and equally palpable; 2+ throughout.      Lab:     CBC:   Lab Results   Component Value Date    WBC 7.1 03/11/2025    RBC 4.29 (L) 03/11/2025    HGB 15.0 03/11/2025    HCT 42.6 03/11/2025     (L) 03/11/2025        CMP:    Lab Results   Component Value Date     03/11/2025    K 4.4 03/11/2025     03/11/2025    CO2 26 03/11/2025    BUN 20 03/11/2025    CREATININE 1.07 03/11/2025    GLUCOSE 97 03/11/2025    CALCIUM 9.5 03/11/2025       Lipid Profile:    Lab Results   Component Value Date    TRIG 64 12/23/2024    HDL 49.7 12/23/2024    LDLCALC 57 12/23/2024    LDLDIRECT 85 04/26/2022       BMP:  Lab Results   Component Value Date     03/11/2025     03/07/2025     01/07/2025    K 4.4  03/11/2025    K 4.2 03/07/2025    K 4.7 01/07/2025     03/11/2025     03/07/2025     01/07/2025    CO2 26 03/11/2025    CO2 26 03/07/2025    CO2 24 01/07/2025    BUN 20 03/11/2025    BUN 22 03/07/2025    BUN 21 01/07/2025    CREATININE 1.07 03/11/2025    CREATININE 1.10 03/07/2025    CREATININE 1.12 01/07/2025       CBC:  Lab Results   Component Value Date    WBC 7.1 03/11/2025    WBC 7.6 03/07/2025    WBC 7.4 01/07/2025    RBC 4.29 (L) 03/11/2025    RBC 4.43 (L) 03/07/2025    RBC 4.18 (L) 01/07/2025    HGB 15.0 03/11/2025    HGB 15.3 03/07/2025    HGB 14.4 01/07/2025    HCT 42.6 03/11/2025    HCT 45.8 03/07/2025    HCT 40.7 (L) 01/07/2025    MCV 99 03/11/2025     (H) 03/07/2025    MCV 97 01/07/2025    MCH 35.0 (H) 03/11/2025    MCH 34.5 (H) 03/07/2025    MCH 34.4 (H) 01/07/2025    MCHC 35.2 03/11/2025    MCHC 33.4 03/07/2025    MCHC 35.4 01/07/2025    RDW 12.6 03/11/2025    RDW 13.1 03/07/2025    RDW 12.1 01/07/2025     (L) 03/11/2025     (L) 03/07/2025     01/07/2025       Hepatic Function Panel:    Lab Results   Component Value Date    ALKPHOS 67 12/23/2024    ALT 27 12/23/2024    AST 29 12/23/2024    PROT 6.7 12/23/2024    BILITOT 0.8 12/23/2024       Magnesium:    Lab Results   Component Value Date    MG 1.40 (L) 11/11/2022       TSH:    Lab Results   Component Value Date    TSH 1.37 05/13/2024     PT/INR:    Lab Results   Component Value Date    PROTIME 11.5 03/11/2025    INR 1.0 03/11/2025       Diagnostic Studies:     MR cardiac morphology and function w and wo IV contrast  Result Date: 7/24/2024  Interpreted By:  Ken Espinoza       1. Normal left ventricular cavity size with severely depressed systolic function. Ejection fraction 34%. 2. Delayed enhancement imaging reveals non transmural subendocardial myocardial infarction of the basal inferior wall, and basal inferior septum. Transmural myocardial infarction of the mid inferior wall, and mid inferior  septum. Non transmural subendocardial myocardial infarction of the basal/mid inferolateral wall. Non transmural subendocardial myocardial infarction of the distal inferior wall. 3. Asymmetric air septal left ventricular hypertrophy. Basal anterior septum 1.2 cm. 4. Normal right ventricular cavity size with preserved systolic function. RV EF 63%.     MACRO: None   Signed by: Ken Espinoza 7/24/2024 5:54 PM Dictation workstation:   [id number]      Problem List:     Patient Active Problem List   Diagnosis    Atherosclerosis of native coronary artery of native heart without angina pectoris    Dyslipidemia    Hypertension    Prostate cancer (Multi)    Routine general medical examination at health care facility    Cardiomyopathy, ischemic    Chronic systolic heart failure    Never smoked tobacco    VT (ventricular tachycardia) (Multi)    ICD (implantable cardioverter-defibrillator) in place    BMI 22.0-22.9, adult       Asessment:     Problem List Items Addressed This Visit           ICD-10-CM    Atherosclerosis of native coronary artery of native heart without angina pectoris I25.10    Relevant Medications    aspirin 81 mg EC tablet    metoprolol succinate XL (Toprol-XL) 25 mg 24 hr tablet    nitroglycerin (Nitrostat) 0.4 mg SL tablet    sacubitriL-valsartan (Entresto) 24-26 mg tablet    Other Relevant Orders    CBC    Comprehensive Metabolic Panel    Lipid Panel    B-Type Natriuretic Peptide    Follow Up In Cardiology    Dyslipidemia E78.5    Relevant Orders    CBC    Comprehensive Metabolic Panel    Lipid Panel    B-Type Natriuretic Peptide    Follow Up In Cardiology    Hypertension I10    Relevant Medications    spironolactone (Aldactone) 25 mg tablet    Other Relevant Orders    CBC    Comprehensive Metabolic Panel    Lipid Panel    B-Type Natriuretic Peptide    Follow Up In Cardiology    CBC    Comprehensive Metabolic Panel    Lipid Panel    B-Type Natriuretic Peptide    Follow Up In Cardiology    Cardiomyopathy,  ischemic I25.5    Relevant Medications    empagliflozin (Jardiance) 10 mg tablet    metoprolol succinate XL (Toprol-XL) 25 mg 24 hr tablet    nitroglycerin (Nitrostat) 0.4 mg SL tablet    sacubitriL-valsartan (Entresto) 24-26 mg tablet    Other Relevant Orders    CBC    Comprehensive Metabolic Panel    Lipid Panel    B-Type Natriuretic Peptide    Follow Up In Cardiology    Chronic systolic heart failure I50.22    Relevant Medications    empagliflozin (Jardiance) 10 mg tablet    metoprolol succinate XL (Toprol-XL) 25 mg 24 hr tablet    nitroglycerin (Nitrostat) 0.4 mg SL tablet    sacubitriL-valsartan (Entresto) 24-26 mg tablet    Other Relevant Orders    CBC    Comprehensive Metabolic Panel    Lipid Panel    B-Type Natriuretic Peptide    Follow Up In Cardiology    Never smoked tobacco Z78.9    Relevant Orders    CBC    Comprehensive Metabolic Panel    Lipid Panel    B-Type Natriuretic Peptide    Follow Up In Cardiology    Hx of percutaneous transluminal coronary angioplasty Z98.61    Relevant Orders    CBC    Comprehensive Metabolic Panel    Lipid Panel    B-Type Natriuretic Peptide    Follow Up In Cardiology     Other Visit Diagnoses         Codes      BMI 26.0-26.9,adult     Z68.26    Relevant Orders    CBC    Comprehensive Metabolic Panel    Lipid Panel    B-Type Natriuretic Peptide    Follow Up In Cardiology      Encounter for medication adjustment     Z51.89    Relevant Orders    CBC    Comprehensive Metabolic Panel    Lipid Panel    B-Type Natriuretic Peptide    Follow Up In Cardiology      Old inferior wall myocardial infarction     I25.2    Relevant Medications    aspirin 81 mg EC tablet    metoprolol succinate XL (Toprol-XL) 25 mg 24 hr tablet    nitroglycerin (Nitrostat) 0.4 mg SL tablet    sacubitriL-valsartan (Entresto) 24-26 mg tablet    Other Relevant Orders    CBC    Comprehensive Metabolic Panel    Lipid Panel    B-Type Natriuretic Peptide    Follow Up In Cardiology      CAD S/P percutaneous coronary  angioplasty     I25.10, Z98.61    Relevant Medications    metoprolol succinate XL (Toprol-XL) 25 mg 24 hr tablet    nitroglycerin (Nitrostat) 0.4 mg SL tablet    sacubitriL-valsartan (Entresto) 24-26 mg tablet      Chest pain     R07.9    Relevant Medications    nitroglycerin (Nitrostat) 0.4 mg SL tablet          Scribe Attestation  By signing my name below, I, Charla Ames CMA   , Scribe   attest that this documentation has been prepared under the direction and in the presence of Juan Sharma MD.    Provider Attestation - Scribe documentation    The above portion of this note was dictated by me using voice recognition software.  All medical record entries made by the scribe were at my direction.  The scribe entering the documentation was in my presence.   I have reviewed the chart and agree that the record accurately reflects my personal performance of the history, physical exam, discussion and plan.

## 2025-06-26 NOTE — PATIENT INSTRUCTIONS
STOP CLOPIDOGREL    NEW:  ASPIRIN 81 MG TAKE 1 TABLET DAILY    CONTINUE ELIQUIS    FASTING LABS, FASTING FROM MIDNIGHT THE NIGHT BEFORE TO BE DONE 4-7 DAYS PRIOR TO YOUR APPOINTMENT WITH DR MARQUIS IN 6 MONTHS    TO SCHEDULE YOUR LAB APPOINTMENT CALL [facility] AT [phone]     Please bring all medicines, vitamins, and herbal supplements with you in original bottles to every appointment! This is the best way to ensure your medication list in your chart is accurate.    Prescriptions will not be filled unless you are compliant with your follow up appointments or have a follow up appointment scheduled as per instruction of your physician. Refills should be requested at the time of your visit.    DID YOU KNOW  We have a pharmacy here in the [facility].  They can fill all prescriptions, not just cardiac medications.  Prescriptions from other pharmacies can easily be transferred to the  pharmacy by the  pharmacist on site.   pharmacies offer FREE HOME DELIVERY on medications to anywhere in [place]. They can sync your medications. Typically prescriptions can be ready in 10 - 15 minutes. If pharmacy is unable to fill your  prescription or if cost is more than your paying now the Pharmacist can easily transfer back to your Pharmacy of choice. Pharmacy phone # [phone].

## 2025-06-27 RX ORDER — ATORVASTATIN CALCIUM 80 MG/1
80 TABLET, FILM COATED ORAL DAILY
Qty: 90 TABLET | Refills: 0 | Status: SHIPPED | OUTPATIENT
Start: 2025-06-27

## 2025-07-22 ENCOUNTER — HOSPITAL ENCOUNTER (OUTPATIENT)
Dept: CARDIOLOGY | Facility: HOSPITAL | Age: 75
Discharge: HOME | End: 2025-07-22
Payer: MEDICARE

## 2025-07-22 DIAGNOSIS — Z95.810 ICD (IMPLANTABLE CARDIOVERTER-DEFIBRILLATOR) IN PLACE: ICD-10-CM

## 2025-07-22 PROCEDURE — 93295 DEV INTERROG REMOTE 1/2/MLT: CPT | Performed by: INTERNAL MEDICINE

## 2025-07-22 PROCEDURE — 93296 REM INTERROG EVL PM/IDS: CPT

## 2025-07-31 ENCOUNTER — TELEPHONE (OUTPATIENT)
Dept: CARDIOLOGY | Facility: CLINIC | Age: 75
End: 2025-07-31
Payer: MEDICARE

## 2025-07-31 NOTE — TELEPHONE ENCOUNTER
Received call from patient stating he needed a renewal for his Entresto.  Verified with [facility] in [place] patient has active rx with refills.  Per pharmacist she has to order patient's medication and it will be ready for  8/1/25 after 2 p.m.  Patient notified of same by phone and he verbalized understanding.  Charla Ames, CMA

## 2025-11-12 ENCOUNTER — APPOINTMENT (OUTPATIENT)
Dept: CARDIOLOGY | Facility: CLINIC | Age: 75
End: 2025-11-12
Payer: MEDICARE

## 2025-12-15 ENCOUNTER — APPOINTMENT (OUTPATIENT)
Dept: CARDIOLOGY | Facility: CLINIC | Age: 75
End: 2025-12-15
Payer: MEDICARE

## 2026-05-11 ENCOUNTER — APPOINTMENT (OUTPATIENT)
Dept: PRIMARY CARE | Facility: CLINIC | Age: 76
End: 2026-05-11
Payer: MEDICARE

## (undated) DEVICE — BAND, VASCULAR, RADIAL HEMOSTAT, REGULAR 24CM

## (undated) DEVICE — INTRODUCER SYSTEM, PRELUDE SNAP, SPLITTABLE, 11 FR X 13 CM

## (undated) DEVICE — CATHETER, BALLOON, NC EUPHORA NONCOMPLIANT 4.0 X 15 X 142CM

## (undated) DEVICE — BANDAGE, QUIKCLOT, INTERVENTIONAL HEMO, W/O SLIT

## (undated) DEVICE — CATHETER, DIAGNOSTIC, 5FR,  PIG-145, 110CM, 6SH ANGLED

## (undated) DEVICE — INFLATION DEVICE, COPILOT VALVE AND 20/30 INDEFLATOR

## (undated) DEVICE — SHEATH, GLIDESHEATH, SLENDER, 6FR 10CM

## (undated) DEVICE — CATHETER, INFINITI DIAGNOSTIC, 5 FR 100CM 3DRC, WILLIAMS RIGHT OR NO TORQUE

## (undated) DEVICE — DRESSING, MEPILEX BORDER, POST-OP AG, 4 X 6 IN

## (undated) DEVICE — CATHETER, OPTITORQUE, 5FR, JACKY, 3.5/ 2H/110CM, CURVED

## (undated) DEVICE — CATHETER, DIAGNOSTIC, JUDKINS, LEFT, 5 FR-JL 4.0

## (undated) DEVICE — INTRODUCER SYSTEM, PRELUDE SNAP, SPLITTABLE, HEMOSTATIC, 7FR

## (undated) DEVICE — SHIELD, RADPAD, ELECTROPHYSIOLOGY, ORANGE, ABSORBENT

## (undated) DEVICE — HEMOSTAT, ARISTA, ABSORBABLE, 3 GRAMS

## (undated) DEVICE — KIT, WRENCH, STERILE, F/LEADS

## (undated) DEVICE — Device

## (undated) DEVICE — GUIDEWIRE, RUN THROUGH WIRE, 180CM

## (undated) DEVICE — CATHETER, GUIDING, VISTA BRITE 6FR, XBRCA 100CM

## (undated) DEVICE — TUBING, MANIFOLD, LOW PRESSURE

## (undated) DEVICE — CLOSURE SYSTEM, VASCULAR, VASCADE, 5 F

## (undated) DEVICE — SHEATH, PINNACLE, W/.038 GW 10CM, 5FR INTRODUCER, 2.5 CM DIALATOR